# Patient Record
Sex: FEMALE | Race: WHITE | Employment: OTHER | URBAN - METROPOLITAN AREA
[De-identification: names, ages, dates, MRNs, and addresses within clinical notes are randomized per-mention and may not be internally consistent; named-entity substitution may affect disease eponyms.]

---

## 2017-03-16 DIAGNOSIS — F32.3 PSYCHOTIC DEPRESSION (HCC): ICD-10-CM

## 2017-03-16 RX ORDER — QUETIAPINE FUMARATE 25 MG/1
TABLET, FILM COATED ORAL
Qty: 135 TAB | Refills: 0 | Status: SHIPPED | OUTPATIENT
Start: 2017-03-16 | End: 2017-04-24 | Stop reason: SDUPTHER

## 2017-04-24 ENCOUNTER — OFFICE VISIT (OUTPATIENT)
Dept: INTERNAL MEDICINE CLINIC | Age: 82
End: 2017-04-24

## 2017-04-24 VITALS
RESPIRATION RATE: 18 BRPM | TEMPERATURE: 97.9 F | OXYGEN SATURATION: 92 % | HEART RATE: 60 BPM | HEIGHT: 60 IN | WEIGHT: 177.9 LBS | DIASTOLIC BLOOD PRESSURE: 80 MMHG | SYSTOLIC BLOOD PRESSURE: 140 MMHG | BODY MASS INDEX: 34.92 KG/M2

## 2017-04-24 DIAGNOSIS — I10 ESSENTIAL HYPERTENSION: ICD-10-CM

## 2017-04-24 DIAGNOSIS — E11.9 DIABETES MELLITUS WITHOUT COMPLICATION (HCC): Primary | ICD-10-CM

## 2017-04-24 DIAGNOSIS — F32.3 PSYCHOTIC DEPRESSION (HCC): ICD-10-CM

## 2017-04-24 DIAGNOSIS — I51.89 DIASTOLIC DYSFUNCTION: ICD-10-CM

## 2017-04-24 RX ORDER — METOPROLOL SUCCINATE 50 MG/1
TABLET, EXTENDED RELEASE ORAL DAILY
COMMUNITY
End: 2017-07-14

## 2017-04-24 RX ORDER — GUAIFENESIN 100 MG/5ML
81 LIQUID (ML) ORAL DAILY
COMMUNITY
End: 2017-07-14

## 2017-04-24 RX ORDER — QUETIAPINE FUMARATE 25 MG/1
TABLET, FILM COATED ORAL
Qty: 135 TAB | Refills: 1 | Status: SHIPPED | OUTPATIENT
Start: 2017-04-24 | End: 2017-10-23 | Stop reason: SDUPTHER

## 2017-04-24 NOTE — MR AVS SNAPSHOT
Visit Information Date & Time Provider Department Dept. Phone Encounter #  
 4/24/2017 10:30 AM Yuliya Hall MD [facility] [phone] [id number] Follow-up Instructions Return in about 6 months (around 10/24/2017). Follow-up and Disposition History Upcoming Health Maintenance Date Due  
 EYE EXAM RETINAL OR DILATED Q1 7/20/1941 DTaP/Tdap/Td series (1 - Tdap) 7/20/1952 GLAUCOMA SCREENING Q2Y 7/20/1996 OSTEOPOROSIS SCREENING (DEXA) 7/20/1996 MEDICARE YEARLY EXAM 7/20/1996 FOOT EXAM Q1 9/14/2016 LIPID PANEL Q1 3/11/2017 HEMOGLOBIN A1C Q6M 4/27/2017 MICROALBUMIN Q1 10/27/2017 Allergies as of 4/24/2017  Review Complete On: 4/24/2017 By: Corine Altamirano Severity Noted Reaction Type Reactions Meperidine Medium 03/11/2016    Hives Other reaction(s): Hives Ace Inhibitors  05/14/2010    Unknown (comments) Codeine  05/14/2010    Unknown (comments) Miralax [Polyethylene Glycol 3350]  05/14/2010    Nausea and Vomiting Percocet [Oxycodone-acetaminophen]  05/14/2010    Unknown (comments) Statins-hmg-coa Reductase Inhibitors  05/14/2010    Unknown (comments) Sular [Nisoldipine]  05/14/2010    Rash Zetia [Ezetimibe]  05/14/2010    Unknown (comments) Current Immunizations  Reviewed on 10/27/2016 Name Date Influenza High Dose Vaccine PF 10/27/2016 Influenza Vaccine 9/25/2013 Influenza Vaccine Split 9/19/2012, 11/17/2011, 11/10/2010 Pneumococcal Conjugate (PCV-13) 10/27/2016 Pneumococcal Vaccine (Unspecified Type) 5/14/2001 Not reviewed this visit You Were Diagnosed With   
  
 Codes Comments Diabetes mellitus without complication ([facility] [address]    -  Primary ICD-10-CM: E11.9 ICD-9-CM: 250.00 [facility]     ICD-10-CM: F32.3 ICD-9-CM: 296.20 Essential hypertension     ICD-10-CM: I10 
ICD-9-CM: 401.9 Diastolic dysfunction     RPY-69-TF: I51.9 ICD-9-CM: 429.9 Vitals BP Pulse Temp Resp Height(growth percentile) Weight(growth percentile) 140/80 (BP 1 Location: Left arm, BP Patient Position: At rest) 60 97.9 °F (36.6 °C) (Oral) 18 5' (1.524 m) 177 lb 14.4 oz (80.7 kg) SpO2 BMI OB Status Smoking Status 92% 34.74 kg/m2 Hysterectomy Never Smoker Vitals History BMI and BSA Data Body Mass Index Body Surface Area 34.74 kg/m 2 1.85 m 2 Preferred Pharmacy Pharmacy Name Phone [id number] [place] [address] [phone] Your Updated Medication List  
  
   
This list is accurate as of: 4/24/17 10:47 AM.  Always use your most recent med list. ALDACTONE 25 mg tablet Generic drug:  spironolactone Take  by mouth daily. aspirin 81 mg chewable tablet Take 81 mg by mouth daily. bumetanide 1 mg tablet Commonly known as:  Evi Lavender Take 0.5 mg by mouth daily. DULCOLAX (BISACODYL) 5 mg EC tablet Generic drug:  bisacodyl Take 5 mg by mouth daily as needed for Constipation. fluticasone 50 mcg/actuation nasal spray Commonly known as:  Mychal Rodriguez TAKE 1 PUFF BY INHALATION DAILY. QUEtiapine 25 mg tablet Commonly known as:  SEROquel TAKE 1 & 1/2 BY MOUTH NIGHTLY  
  
 TOPROL XL 50 mg XL tablet Generic drug:  metoprolol succinate Take  by mouth daily. XALATAN 0.005 % ophthalmic solution Generic drug:  latanoprost  
Administer 1 Drop to both eyes nightly. Prescriptions Sent to Pharmacy Refills QUEtiapine (SEROQUEL) 25 mg tablet 1 Sig: TAKE 1 & 1/2 BY MOUTH NIGHTLY Class: Normal  
 Pharmacy: [facility] [address] Ph #: [phone] We Performed the Following HEMOGLOBIN A1C WITH EAG [20425 CPT(R)] METABOLIC PANEL, COMPREHENSIVE [84935 CPT(R)] Follow-up Instructions Return in about 6 months (around 10/24/2017). Introducing [facility] & HEALTH SERVICES! Kyleigh Degroot introduces [employer] patient portal. Now you can access parts of your medical record, email your doctor's office, and request medication refills online. 1. In your internet browser, go to [email or web address] 2. Click on the First Time User? Click Here link in the Sign In box. You will see the New Member Sign Up page. 3. Enter your [employer] Access Code exactly as it appears below. You will not need to use this code after youve completed the sign-up process. If you do not sign up before the expiration date, you must request a new code. · [employer] Access Code: [email or web address] Expires: 7/23/2017 10:47 AM 
 
4. Enter the last four digits of your Social Security Number (xxxx) and Date of Birth (mm/dd/yyyy) as indicated and click Submit. You will be taken to the next sign-up page. 5. Create a Second[employer] ID. This will be your Second[employer] login ID and cannot be changed, so think of one that is secure and easy to remember. 6. Create a Second[employer] password. You can change your password at any time. 7. Enter your Password Reset Question and Answer. This can be used at a later time if you forget your password. 8. Enter your e-mail address. You will receive e-mail notification when new information is available in [address] 9. Click Sign Up. You can now view and download portions of your medical record. 10. Click the Download Summary menu link to download a portable copy of your medical information. If you have questions, please visit the Frequently Asked Questions section of the [employer] website. Remember, [employer] is NOT to be used for urgent needs. For medical emergencies, dial 911. Now available from your iPhone and Android! Please provide this summary of care documentation to your next provider. Your primary care clinician is listed as [address] If you have any questions after today's visit, please call [phone].

## 2017-04-24 NOTE — PROGRESS NOTES
HISTORY OF PRESENT ILLNESS  Abhi Cee is a 80 y.o. female. HPI  Here for dm. She is well controlled on diet alone. Her psychotic depression is controlled. She is happy living with her daughter. Her htn and chf are compensated. She sees cardiology. Her pacer was adjusted recently and she has less energy since. Past Medical History:   Diagnosis Date    Anemia     Bradycardia     Depression     Diabetes (HCC)     DJD (degenerative joint disease), lumbar     GERD (gastroesophageal reflux disease)     stricture.  Glucose intolerance     HTN (hypertension)     Keratoconjunctivitis     sicca    Macular degeneration     macular degeneration vs ooptic neuropathy    ABAD (obstructive sleep apnea)     Osteopenia     Otitis media     chronic    Stroke ([facility]     Venous insufficiency 10/10/2011     Current Outpatient Prescriptions   Medication Sig    aspirin 81 mg chewable tablet Take 81 mg by mouth daily.  QUEtiapine (SEROQUEL) 25 mg tablet TAKE 1 & 1/2 BY MOUTH NIGHTLY    fluticasone (FLONASE) 50 mcg/actuation nasal spray TAKE 1 PUFF BY INHALATION DAILY.  bumetanide (BUMEX) 1 mg tablet Take 0.5 mg by mouth daily.  bisacodyl (DULCOLAX, BISACODYL,) 5 mg EC tablet Take 5 mg by mouth daily as needed for Constipation.  latanoprost (XALATAN) 0.005 % ophthalmic solution Administer 1 Drop to both eyes nightly.  metoprolol-XL (TOPROL XL) 100 mg XL tablet Take  by mouth daily.  spironolactone (ALDACTONE) 25 mg tablet Take  by mouth daily. No current facility-administered medications for this visit. Review of Systems   All other systems reviewed and are negative.                                                  Visit Vitals    /80 (BP 1 Location: Left arm, BP Patient Position: At rest)    Pulse 60    Temp 97.9 °F (36.6 °C) (Oral)    Resp 18    Ht 5' (1.524 m)    Wt 177 lb 14.4 oz (80.7 kg)    SpO2 92%    BMI 34.74 kg/m2       Physical Exam   Constitutional: She appears well-developed and well-nourished. Cardiovascular: Normal rate, regular rhythm and normal heart sounds. Pulmonary/Chest: Effort normal and breath sounds normal. No respiratory distress. She has no wheezes. She has no rales. Musculoskeletal: She exhibits no edema. Psychiatric: She has a normal mood and affect. Her behavior is normal. Judgment and thought content normal.   Nursing note and vitals reviewed. Lab Results   Component Value Date/Time    Hemoglobin A1c 5.9 10/27/2016 10:06 AM         ASSESSMENT and Lawerence Single was seen today for hypertension. Diagnoses and all orders for this visit:    Diabetes mellitus without complication ([facility] [address] [facility]  -     HEMOGLOBIN A1C WITH EAG  -     METABOLIC PANEL, COMPREHENSIVE  The current medical regimen is effective;  continue present plan and medications. Psychotic depression (HCC)  -     QUEtiapine (SEROQUEL) 25 mg tablet; TAKE 1 & 1/2 BY MOUTH NIGHTLY  Doing well. Essential hypertension  Reduce toprol to 50 mg .    Diastolic dysfunction  Doing well.     Reviewed plan of care with the patient who has provided input and agrees with the goals

## 2017-04-25 LAB
ALBUMIN SERPL-MCNC: 4.1 G/DL (ref 3.5–4.7)
ALBUMIN/GLOB SERPL: 2.1 {RATIO} (ref 1.2–2.2)
ALP SERPL-CCNC: 82 IU/L (ref 39–117)
ALT SERPL-CCNC: 5 IU/L (ref 0–32)
AST SERPL-CCNC: 19 IU/L (ref 0–40)
BILIRUB SERPL-MCNC: 0.8 MG/DL (ref 0–1.2)
BUN SERPL-MCNC: 16 MG/DL (ref 8–27)
BUN/CREAT SERPL: 17 (ref 12–28)
CALCIUM SERPL-MCNC: 9.3 MG/DL (ref 8.7–10.3)
CHLORIDE SERPL-SCNC: 103 MMOL/L (ref 96–106)
CO2 SERPL-SCNC: 24 MMOL/L (ref 18–29)
CREAT SERPL-MCNC: 0.93 MG/DL (ref 0.57–1)
EST. AVERAGE GLUCOSE BLD GHB EST-MCNC: 126 MG/DL
GLOBULIN SER CALC-MCNC: 2 G/DL (ref 1.5–4.5)
GLUCOSE SERPL-MCNC: 77 MG/DL (ref 65–99)
HBA1C MFR BLD: 6 % (ref 4.8–5.6)
INTERPRETATION: NORMAL
Lab: NORMAL
POTASSIUM SERPL-SCNC: 5.3 MMOL/L (ref 3.5–5.2)
PROT SERPL-MCNC: 6.1 G/DL (ref 6–8.5)
SODIUM SERPL-SCNC: 144 MMOL/L (ref 134–144)

## 2017-06-12 ENCOUNTER — OFFICE VISIT (OUTPATIENT)
Dept: INTERNAL MEDICINE CLINIC | Age: 82
End: 2017-06-12

## 2017-06-12 VITALS
HEART RATE: 55 BPM | WEIGHT: 174.4 LBS | DIASTOLIC BLOOD PRESSURE: 63 MMHG | SYSTOLIC BLOOD PRESSURE: 151 MMHG | TEMPERATURE: 97.9 F | BODY MASS INDEX: 34.24 KG/M2 | OXYGEN SATURATION: 96 % | RESPIRATION RATE: 18 BRPM | HEIGHT: 60 IN

## 2017-06-12 DIAGNOSIS — R10.11 RUQ ABDOMINAL PAIN: Primary | ICD-10-CM

## 2017-06-12 RX ORDER — OMEPRAZOLE 20 MG/1
20 CAPSULE, DELAYED RELEASE ORAL DAILY
Qty: 90 CAP | Refills: 1 | Status: SHIPPED | OUTPATIENT
Start: 2017-06-12 | End: 2017-10-23 | Stop reason: SDUPTHER

## 2017-06-12 NOTE — MR AVS SNAPSHOT
Visit Information Date & Time Provider Department Dept. Phone Encounter #  
 6/12/2017  3:15 PM Gerhardt Duel, MD [address] [facility] [phone] Follow-up Instructions Return if symptoms worsen or fail to improve. Your Appointments 10/25/2017 10:45 AM  
ROUTINE CARE with Gerhardt Duel, MD  
[facility]) Appt Note: 6 MONTH F/U  
 [place] [address] [facility] [address]  
[facility] [address] [facility] [address] [address] Upcoming Health Maintenance Date Due  
 EYE EXAM RETINAL OR DILATED Q1 7/20/1941 DTaP/Tdap/Td series (1 - Tdap) 7/20/1952 GLAUCOMA SCREENING Q2Y 7/20/1996 OSTEOPOROSIS SCREENING (DEXA) 7/20/1996 MEDICARE YEARLY EXAM 7/20/1996 FOOT EXAM Q1 9/14/2016 LIPID PANEL Q1 3/11/2017 INFLUENZA AGE 9 TO ADULT 8/1/2017 HEMOGLOBIN A1C Q6M 10/24/2017 MICROALBUMIN Q1 10/27/2017 Allergies as of 6/12/2017  Review Complete On: 6/12/2017 By: Jovan Apple Severity Noted Reaction Type Reactions Meperidine Medium 03/11/2016    Hives Other reaction(s): Hives Ace Inhibitors  05/14/2010    Unknown (comments) Codeine  05/14/2010    Unknown (comments) Miralax [Polyethylene Glycol 3350]  05/14/2010    Nausea and Vomiting Percocet [Oxycodone-acetaminophen]  05/14/2010    Unknown (comments) Statins-hmg-coa Reductase Inhibitors  05/14/2010    Unknown (comments) Sular [Nisoldipine]  05/14/2010    Rash Zetia [Ezetimibe]  05/14/2010    Unknown (comments) Current Immunizations  Reviewed on 10/27/2016 Name Date Influenza High Dose Vaccine PF 10/27/2016 Influenza Vaccine 9/25/2013 Influenza Vaccine Split 9/19/2012, 11/17/2011, 11/10/2010 Pneumococcal Conjugate (PCV-13) 10/27/2016 Pneumococcal Vaccine (Unspecified Type) 5/14/2001 Not reviewed this visit You Were Diagnosed With   
  
 Codes Comments RUQ abdominal pain    -  Primary ICD-10-CM: R10.11 ICD-9-CM: 789.01 Vitals BP Pulse Temp Resp Height(growth percentile) Weight(growth percentile) 151/63 (BP 1 Location: Left arm, BP Patient Position: At rest) (!) 55 97.9 °F (36.6 °C) (Oral) 18 5' (1.524 m) 174 lb 6.4 oz (79.1 kg) SpO2 BMI OB Status Smoking Status 96% 34.06 kg/m2 Hysterectomy Never Smoker BMI and BSA Data Body Mass Index Body Surface Area 34.06 kg/m 2 1.83 m 2 Preferred Pharmacy Pharmacy Name Phone [id number] Yodit Contreras, [address] [place] [address] [phone] Your Updated Medication List  
  
   
This list is accurate as of: 6/12/17  3:38 PM.  Always use your most recent med list. ALDACTONE 25 mg tablet Generic drug:  spironolactone Take  by mouth daily. aspirin 81 mg chewable tablet Take 81 mg by mouth daily. bumetanide 1 mg tablet Commonly known as:  Shelagh [place] Take 0.5 mg by mouth daily. DULCOLAX (BISACODYL) 5 mg EC tablet Generic drug:  bisacodyl Take 5 mg by mouth daily as needed for Constipation. fluticasone 50 mcg/actuation nasal spray Commonly known as:  Christian Noss TAKE 1 PUFF BY INHALATION DAILY. omeprazole 20 mg capsule Commonly known as:  PRILOSEC Take 1 Cap by mouth daily. QUEtiapine 25 mg tablet Commonly known as:  SEROquel TAKE 1 & 1/2 BY MOUTH NIGHTLY  
  
 TOPROL XL 50 mg XL tablet Generic drug:  metoprolol succinate Take  by mouth daily. XALATAN 0.005 % ophthalmic solution Generic drug:  latanoprost  
Administer 1 Drop to both eyes nightly. Prescriptions Sent to Pharmacy Refills  
 omeprazole (PRILOSEC) 20 mg capsule 1 Sig: Take 1 Cap by mouth daily. Class: Normal  
 Pharmacy: [id number] [facility] [address]  #: [phone] Route: Oral  
  
Follow-up Instructions Return if symptoms worsen or fail to improve. To-Do List   
 06/12/2017 Imaging:  [employer] Introducing [facility] & HEALTH SERVICES! Gabby Galo introduces [employer] patient portal. Now you can access parts of your medical record, email your doctor's office, and request medication refills online. 1. In your internet browser, go to [email or web address] 2. Click on the First Time User? Click Here link in the Sign In box. You will see the New Member Sign Up page. 3. Enter your [employer] Access Code exactly as it appears below. You will not need to use this code after youve completed the sign-up process. If you do not sign up before the expiration date, you must request a new code. · [employer] Access Code: [email or web address] Expires: 7/23/2017 10:47 AM 
 
4. Enter the last four digits of your Social Security Number (xxxx) and Date of Birth (mm/dd/yyyy) as indicated and click Submit. You will be taken to the next sign-up page. 5. Create a [employer] ID. This will be your [employer] login ID and cannot be changed, so think of one that is secure and easy to remember. 6. Create a [employer] password. You can change your password at any time. 7. Enter your Password Reset Question and Answer. This can be used at a later time if you forget your password. 8. Enter your e-mail address. You will receive e-mail notification when new information is available in [address] 9. Click Sign Up. You can now view and download portions of your medical record. 10. Click the Download Summary menu link to download a portable copy of your medical information. If you have questions, please visit the Frequently Asked Questions section of the [employer] website. Remember, [employer] is NOT to be used for urgent needs. For medical emergencies, dial 911. Now available from your iPhone and Android! Please provide this summary of care documentation to your next provider. Your primary care clinician is listed as [id number] [address] [place] [address] [address]. If you have any questions after today's visit, please call [phone].

## 2017-06-12 NOTE — PROGRESS NOTES
1. Have you been to the ER, urgent care clinic since your last visit? Hospitalized since your last visit?no    2. Have you seen or consulted any other health care providers outside of the [address] [place] [address] since your last visit? Include any pap smears or colon screening.  No  Chief Complaint   Patient presents with    Abdominal Pain     Not fasting

## 2017-06-12 NOTE — PROGRESS NOTES
HISTORY OF PRESENT ILLNESS  Emily Dao is a 80 y.o. female. HPI  Here for abdominal pain. This was present yesterday but better today. She had nausea and some reflux. She is moving her bowels. No melena. She is on asa. Past Medical History:   Diagnosis Date    Anemia     Bradycardia     Depression     Diabetes (HCC)     DJD (degenerative joint disease), lumbar     GERD (gastroesophageal reflux disease)     stricture.  Glucose intolerance     HTN (hypertension)     Keratoconjunctivitis     sicca    Macular degeneration     macular degeneration vs ooptic neuropathy    ABAD (obstructive sleep apnea)     Osteopenia     Otitis media     chronic    Stroke ([facility] [address] [facility]     Venous insufficiency 10/10/2011     Current Outpatient Prescriptions   Medication Sig    omeprazole (PRILOSEC) 20 mg capsule Take 1 Cap by mouth daily.  aspirin 81 mg chewable tablet Take 81 mg by mouth daily.  metoprolol succinate (TOPROL XL) 50 mg XL tablet Take  by mouth daily.  QUEtiapine (SEROQUEL) 25 mg tablet TAKE 1 & 1/2 BY MOUTH NIGHTLY    fluticasone (FLONASE) 50 mcg/actuation nasal spray TAKE 1 PUFF BY INHALATION DAILY.  bumetanide (BUMEX) 1 mg tablet Take 0.5 mg by mouth daily.  bisacodyl (DULCOLAX, BISACODYL,) 5 mg EC tablet Take 5 mg by mouth daily as needed for Constipation.  latanoprost (XALATAN) 0.005 % ophthalmic solution Administer 1 Drop to both eyes nightly.  spironolactone (ALDACTONE) 25 mg tablet Take  by mouth daily. No current facility-administered medications for this visit. Review of Systems   All other systems reviewed and are negative. Visit Vitals    /63 (BP 1 Location: Left arm, BP Patient Position: At rest)    Pulse (!) 55    Temp 97.9 °F (36.6 °C) (Oral)    Resp 18    Ht 5' (1.524 m)    Wt 174 lb 6.4 oz (79.1 kg)    SpO2 96%    BMI 34.06 kg/m2       Physical Exam   Constitutional: She appears well-developed and well-nourished.    Abdominal: Bowel sounds are normal. She exhibits no distension and no mass. There is tenderness. There is no rebound and no guarding. Nursing note and vitals reviewed. ASSESSMENT and Pb Alejandro was seen today for abdominal pain. Diagnoses and all orders for this visit:    RUQ abdominal pain  -     US ABD LTD; Future  -     omeprazole (PRILOSEC) 20 mg capsule; Take 1 Cap by mouth daily. Hold ASA. To ER if worse.       Reviewed plan of care with the patient who has provided input and agrees with the goals

## 2017-06-13 ENCOUNTER — TELEPHONE (OUTPATIENT)
Dept: INTERNAL MEDICINE CLINIC | Age: 82
End: 2017-06-13

## 2017-06-13 ENCOUNTER — HOSPITAL ENCOUNTER (OUTPATIENT)
Dept: ULTRASOUND IMAGING | Age: 82
Discharge: HOME | End: 2017-06-13
Payer: MEDICARE

## 2017-06-13 DIAGNOSIS — R10.11 RUQ ABDOMINAL PAIN: ICD-10-CM

## 2017-06-13 DIAGNOSIS — R10.11 RUQ ABDOMINAL PAIN: Primary | ICD-10-CM

## 2017-06-13 PROCEDURE — 76705 ECHO EXAM OF ABDOMEN: CPT

## 2017-06-13 NOTE — TELEPHONE ENCOUNTER
Spoke with patient. Advised patient per Dr. Erica Sullivan that ultrasound is normal. Need HIDA scan to make samina gallbladder empties well.  Patient verbalized understanding

## 2017-07-05 ENCOUNTER — HOSPITAL ENCOUNTER (OUTPATIENT)
Dept: NUCLEAR MEDICINE | Age: 82
Discharge: HOME | End: 2017-07-05
Payer: MEDICARE

## 2017-07-05 DIAGNOSIS — R10.11 RUQ ABDOMINAL PAIN: ICD-10-CM

## 2017-07-05 PROCEDURE — 78226 HEPATOBILIARY SYSTEM IMAGING: CPT

## 2017-07-06 ENCOUNTER — TELEPHONE (OUTPATIENT)
Dept: INTERNAL MEDICINE CLINIC | Age: 82
End: 2017-07-06

## 2017-07-06 DIAGNOSIS — K82.9 GALLBLADDER ATTACK: Primary | ICD-10-CM

## 2017-07-06 NOTE — TELEPHONE ENCOUNTER
Spoke with patient. Advised per Dr Bettye Davidson that advise per Dr Dee Hedrick that gallbladder appears to empty slowly. See general surgery Dr Don Hubbard phone number [phone] if still having pain.  Patient verbalized understanding

## 2017-07-06 NOTE — TELEPHONE ENCOUNTER
Left message for patient to call back to advise per Dr Manzo Mt that gallbladder appears to empty slowly.  See general surgery Dr Maru Danielson phone number [phone] if still having pain

## 2017-07-11 ENCOUNTER — OFFICE VISIT (OUTPATIENT)
Dept: SURGERY | Age: 82
End: 2017-07-11

## 2017-07-11 VITALS
SYSTOLIC BLOOD PRESSURE: 119 MMHG | DIASTOLIC BLOOD PRESSURE: 54 MMHG | OXYGEN SATURATION: 96 % | HEART RATE: 53 BPM | HEIGHT: 60 IN | BODY MASS INDEX: 34.16 KG/M2 | TEMPERATURE: 98.7 F | WEIGHT: 174 LBS | RESPIRATION RATE: 14 BRPM

## 2017-07-11 DIAGNOSIS — K81.1 CHRONIC CHOLECYSTITIS: Primary | ICD-10-CM

## 2017-07-11 NOTE — PROGRESS NOTES
Cholelithasis Consultation      Subjective:      Diya Davis is an 80 y.o.  female who was referred by:  Moreno Salazar MD for evaluation of chronic cholecystitis. Patient complains of epigastric pain. It is described as sharp and rated as moderate. It has been going on for about 6 weeks. Eating makes the pain worse. Nothing makes it better. It is associated with nausea and reflux. Patient denies fevers, chills, malaise and change in bowel habits. Past Medical History:   Diagnosis Date    Anemia     Bradycardia     Depression     \"better since moving into my daughters mother-in-law suite\"    Diabetes ([facility]     \"border\" per patient report    DJD (degenerative joint disease), lumbar     GERD (gastroesophageal reflux disease)     stricture.  Glaucoma     Glucose intolerance     [place] (hard of hearing)     HTN (hypertension)     Keratoconjunctivitis     sicca    Macular degeneration     macular degeneration vs ooptic neuropathy    ABAD (obstructive sleep apnea)     decided not to use d/t frequent bathroom trips at night. didn't help    Osteopenia     Otitis media     chronic    Stroke [facility] 2011    Mini stroke \"in the past, affected my eyes\" Need glasses for reading    Venous insufficiency 10/10/2011     Family History   Problem Relation Age of Onset    Heart Failure Mother     Heart Disease Father     Stroke Brother      Cannot display prior to admission medications because the patient has not been admitted in this contact.      Allergies   Allergen Reactions    Meperidine Hives     Other reaction(s): Hives    Ace Inhibitors Unknown (comments)    Codeine Unknown (comments)    Miralax [Polyethylene Glycol 3350] Nausea and Vomiting    Other Plant, Animal, Environmental Rash     Bilateral Hearing Aids cause severe reaction per pt report    Percocet [Oxycodone-Acetaminophen] Unknown (comments)    Statins-Hmg-Coa Reductase Inhibitors Unknown (comments)    Sular [Nisoldipine] Rash    Zetia [Ezetimibe] Unknown (comments)     Social History     Social History    Marital status:      Spouse name: N/A    Number of children: N/A    Years of education: N/A     Occupational History    Not on file. Social History Main Topics    Smoking status: Never Smoker    Smokeless tobacco: Never Used    Alcohol use No    Drug use: No    Sexual activity: Not Currently     Partners: Male     Other Topics Concern    Not on file     Social History Narrative       Review of Systems: A comprehensive review of systems was negative except for that written in the HPI. Objective:        Visit Vitals    /54 (BP 1 Location: Left arm, BP Patient Position: Sitting)    Pulse (!) 53    Temp 98.7 °F (37.1 °C) (Oral)    Resp 14    Ht 5' (1.524 m)    Wt 174 lb (78.9 kg)    SpO2 96%    BMI 33.98 kg/m2        Visit Vitals    /54 (BP 1 Location: Left arm, BP Patient Position: Sitting)    Pulse (!) 53    Temp 98.7 °F (37.1 °C) (Oral)    Resp 14    Ht 5' (1.524 m)    Wt 174 lb (78.9 kg)    SpO2 96%    BMI 33.98 kg/m2     General appearance: alert, cooperative, no distress, appears stated age, mildly obese  Head: Normocephalic, without obvious abnormality, atraumatic  Eyes: conjunctivae/corneas clear., EOM's intact. Lungs: clear to auscultation bilaterally  Heart: regular rate and rhythm, S1, S2 normal, no murmur, click, rub or gallop  Abdomen: soft, non-tender. Bowel sounds normal. No masses,  no organomegaly  Neurologic: Alert and oriented X 3, . Normal symmetric reflexes. Normal coordination and gait    Imaging:  images and reports reviewed    Lab Review:      No results found for this or any previous visit (from the past 24 hour(s)). Labs and radiology images and reports reviewed      Assessment:     Likely chronic Cholecystitis    Plan/Recommendations/Medical Decision Makin.  I recommend laparoscopic cholecystectomy with possible cholangiogram Treatment alternatives were discussed. 2. Discussed aspects of surgical intervention, methods, risks (including by not limited to infection, bleeding, retained gallstones in the abdominal cavity and/or the main bile ducts, and injury to adjacent structures including the biliary system, common bile duct, and intestines.)  The patient understands the risks, any and all questions were answered to the patient's satisfaction. 3. Patient does wish to proceed with surgery. Surgery : laparoscopic cholecystectomy, cholangiogram, possible open     Length:  1 hours    Diagnosis :     ICD-10-CM ICD-9-CM   1.  Chronic cholecystitis K81.1 575.11       Anesthesia : general anesthesia    Surgery Type: observation    Position:  supine    Hospital : [facility]    Blood thinner NO

## 2017-07-11 NOTE — PROGRESS NOTES
1. Have you been to the ER, urgent care clinic since your last visit? Hospitalized since your last visit?no    2. Have you seen or consulted any other health care providers outside of the [address] [place] [address] since your last visit? Include any pap smears or colon screening.  no

## 2017-07-14 ENCOUNTER — HOSPITAL ENCOUNTER (OUTPATIENT)
Dept: PREADMISSION TESTING | Age: 82
Discharge: HOME | End: 2017-07-14
Payer: MEDICARE

## 2017-07-14 ENCOUNTER — HOSPITAL ENCOUNTER (OUTPATIENT)
Dept: GENERAL RADIOLOGY | Age: 82
Discharge: HOME | End: 2017-07-14
Attending: SURGERY
Payer: MEDICARE

## 2017-07-14 VITALS
RESPIRATION RATE: 16 BRPM | SYSTOLIC BLOOD PRESSURE: 188 MMHG | TEMPERATURE: 98.1 F | DIASTOLIC BLOOD PRESSURE: 79 MMHG | HEIGHT: 61 IN | BODY MASS INDEX: 32.72 KG/M2 | WEIGHT: 173.28 LBS | OXYGEN SATURATION: 95 % | HEART RATE: 56 BPM

## 2017-07-14 LAB
ALBUMIN SERPL BCP-MCNC: 3.8 G/DL (ref 3.5–5)
ALBUMIN/GLOB SERPL: 1.3 {RATIO} (ref 1.1–2.2)
ALP SERPL-CCNC: 80 U/L (ref 45–117)
ALT SERPL-CCNC: 12 U/L (ref 12–78)
ANION GAP BLD CALC-SCNC: 8 MMOL/L (ref 5–15)
AST SERPL W P-5'-P-CCNC: 11 U/L (ref 15–37)
ATRIAL RATE: 72 BPM
BASOPHILS # BLD AUTO: 0 K/UL (ref 0–0.1)
BASOPHILS # BLD: 0 % (ref 0–1)
BILIRUB SERPL-MCNC: 0.6 MG/DL (ref 0.2–1)
BUN SERPL-MCNC: 19 MG/DL (ref 6–20)
BUN/CREAT SERPL: 19 (ref 12–20)
CALCIUM SERPL-MCNC: 9.1 MG/DL (ref 8.5–10.1)
CALCULATED P AXIS, ECG09: -27 DEGREES
CALCULATED R AXIS, ECG10: -47 DEGREES
CALCULATED T AXIS, ECG11: 24 DEGREES
CHLORIDE SERPL-SCNC: 106 MMOL/L (ref 97–108)
CO2 SERPL-SCNC: 29 MMOL/L (ref 21–32)
CREAT SERPL-MCNC: 0.98 MG/DL (ref 0.55–1.02)
DIAGNOSIS, 93000: NORMAL
EOSINOPHIL # BLD: 0.2 K/UL (ref 0–0.4)
EOSINOPHIL NFR BLD: 3 % (ref 0–7)
ERYTHROCYTE [DISTWIDTH] IN BLOOD BY AUTOMATED COUNT: 13 % (ref 11.5–14.5)
GLOBULIN SER CALC-MCNC: 2.9 G/DL (ref 2–4)
GLUCOSE SERPL-MCNC: 83 MG/DL (ref 65–100)
HCT VFR BLD AUTO: 46.5 % (ref 35–47)
HGB BLD-MCNC: 15.9 G/DL (ref 11.5–16)
LYMPHOCYTES # BLD AUTO: 20 % (ref 12–49)
LYMPHOCYTES # BLD: 1.6 K/UL (ref 0.8–3.5)
MCH RBC QN AUTO: 30.6 PG (ref 26–34)
MCHC RBC AUTO-ENTMCNC: 34.2 G/DL (ref 30–36.5)
MCV RBC AUTO: 89.4 FL (ref 80–99)
MONOCYTES # BLD: 0.9 K/UL (ref 0–1)
MONOCYTES NFR BLD AUTO: 12 % (ref 5–13)
NEUTS SEG # BLD: 5.2 K/UL (ref 1.8–8)
NEUTS SEG NFR BLD AUTO: 65 % (ref 32–75)
P-R INTERVAL, ECG05: 278 MS
PLATELET # BLD AUTO: 165 K/UL (ref 150–400)
POTASSIUM SERPL-SCNC: 4.7 MMOL/L (ref 3.5–5.1)
PROT SERPL-MCNC: 6.7 G/DL (ref 6.4–8.2)
Q-T INTERVAL, ECG07: 408 MS
QRS DURATION, ECG06: 78 MS
QTC CALCULATION (BEZET), ECG08: 446 MS
RBC # BLD AUTO: 5.2 M/UL (ref 3.8–5.2)
SODIUM SERPL-SCNC: 143 MMOL/L (ref 136–145)
VENTRICULAR RATE, ECG03: 72 BPM
WBC # BLD AUTO: 8 K/UL (ref 3.6–11)

## 2017-07-14 PROCEDURE — 85025 COMPLETE CBC W/AUTO DIFF WBC: CPT | Performed by: SURGERY

## 2017-07-14 PROCEDURE — 71020 XR CHEST PA LAT: CPT

## 2017-07-14 PROCEDURE — 36415 COLL VENOUS BLD VENIPUNCTURE: CPT | Performed by: SURGERY

## 2017-07-14 PROCEDURE — 93005 ELECTROCARDIOGRAM TRACING: CPT

## 2017-07-14 PROCEDURE — 80053 COMPREHEN METABOLIC PANEL: CPT | Performed by: SURGERY

## 2017-07-14 RX ORDER — METOPROLOL SUCCINATE 100 MG/1
100 TABLET, EXTENDED RELEASE ORAL DAILY
COMMUNITY
End: 2018-04-11 | Stop reason: ALTCHOICE

## 2017-07-14 NOTE — PERIOP NOTES
[facility]  PREOPERATIVE INSTRUCTIONS    Surgery Date:   7/24/17    Surgery arrival time given by surgeon: NO  (If [facility] staff will call you between 4pm - 8pm the day before surgery with your arrival time. If your surgery is on a Monday, we will call you the preceding Friday. Please call [phone] after 7pm if you did not receive your arrival time.)    1. Report  to the 2nd Floor Admitting Desk at the time you were told the night before surgery. Bring your insurance card, photo identification, and any copayment (if applicable). 2. You must have a responsible adult to drive you home and stay with you the first 24 hours after surgery if you are going home the same day of your surgery. 3. Nothing to eat or drink after midnight the night before surgery. This means NO water, gum, mints, coffee, juice, etc.    4. No alcoholic beverages 24 hours before and after your surgery. 5. If you are being admitted to the hospital,please leave personal belongings/luggage in your car until you have an assigned hospital room number. 6. The hospital discharge time is 12pm NOON. Your adult  should be here prior to the 12pm NOON discharge time. 7. NO Aspirin and/or any non-steroidal anti-inflammatory drugs (i.e. Ibuprofen, Naproxen, Advil, Aleve) as directed by your surgeon. You may take Tylenol. Stop herbal supplements 1 week prior to  surgery. 8. If you are currently taking Plavix, Coumadin,or any other blood-thinning/ anticoagulant medication contact your surgeon for instructions. 9. Wear comfortable clothes. Wear your glasses instead of contacts. Please leave all money, jewelry and valuables at home. No make up, particularly mascara or finger nail polish, the day of surgery. 10.  REMOVE ALL body piercings, rings,and jewelry and leave at home. Wear your hair loose or down.; no pony-tails, buns, or any metal hair clips.    11. If you shower the morning of surgery, please do not apply any lotions, powders, or deodorants afterwards. Do not shave any body area within 24 hours of your surgery. 12. Please follow all instructions to avoid any potential surgical cancellation. 13. Should your physical condition change, (i.e. fever, cold, flu, etc.) please notify your surgeon as soon as possible. 14. It is important to be on time. If a situation occurs where you may be delayed, please call:  [phone] / [phone] on the day of surgery. 15. The Preadmission Testing staff can be reached at [phone]. 16. MEDICATIONS TO TAKE THE MORNING OF SURGERY WITH A SIP OF WATER: METOPROLOL, OMEPRAZOLE  17. Special instructions: DO NOT TAKE YOUR WATER PILLS THE MORNING OF SURGERY    The patient was contacted  in person. She  verbalize  understanding of all instructions does not  need reinforcement.

## 2017-07-17 ENCOUNTER — TELEPHONE (OUTPATIENT)
Dept: SURGERY | Age: 82
End: 2017-07-17

## 2017-07-17 NOTE — PERIOP NOTES
Called the office of Dr. Drea Waters and spoke with Giovanna Mitchell to let them know the patient will need a cardiac clearance per Dr. Shagufta Nash. 7/17/2017.  DOS: 7/24/2017

## 2017-07-17 NOTE — TELEPHONE ENCOUNTER
Patient has appointment set for 7/24/17 @ 3:15 with Dr Neptali Greene for cardiac clearance. Patient understands surgery is cancelled until she is clear by Dr Neptali Greene.

## 2017-07-28 ENCOUNTER — ANESTHESIA EVENT (OUTPATIENT)
Dept: SURGERY | Age: 82
End: 2017-07-28
Payer: MEDICARE

## 2017-07-31 ENCOUNTER — HOSPITAL ENCOUNTER (OUTPATIENT)
Age: 82
Setting detail: OBSERVATION
Discharge: HOME | End: 2017-08-01
Attending: SURGERY | Admitting: SURGERY
Payer: MEDICARE

## 2017-07-31 ENCOUNTER — ANESTHESIA (OUTPATIENT)
Dept: SURGERY | Age: 82
End: 2017-07-31
Payer: MEDICARE

## 2017-07-31 DIAGNOSIS — Z90.49 S/P LAPAROSCOPIC CHOLECYSTECTOMY: ICD-10-CM

## 2017-07-31 PROBLEM — K80.20 GALLSTONES: Status: ACTIVE | Noted: 2017-07-31

## 2017-07-31 PROCEDURE — 76060000033 HC ANESTHESIA 1 TO 1.5 HR: Performed by: SURGERY

## 2017-07-31 PROCEDURE — 77030008771 HC TU NG SALEM SUMP -A: Performed by: NURSE ANESTHETIST, CERTIFIED REGISTERED

## 2017-07-31 PROCEDURE — 77030035051: Performed by: SURGERY

## 2017-07-31 PROCEDURE — 77010033678 HC OXYGEN DAILY

## 2017-07-31 PROCEDURE — 77030035045 HC TRCR ENDOSC VRSPRT BLDLSS COVD -B: Performed by: SURGERY

## 2017-07-31 PROCEDURE — 77030008684 HC TU ET CUF COVD -B: Performed by: NURSE ANESTHETIST, CERTIFIED REGISTERED

## 2017-07-31 PROCEDURE — 74011250637 HC RX REV CODE- 250/637: Performed by: SURGERY

## 2017-07-31 PROCEDURE — 74011250636 HC RX REV CODE- 250/636: Performed by: SURGERY

## 2017-07-31 PROCEDURE — 77030019908 HC STETH ESOPH SIMS -A: Performed by: NURSE ANESTHETIST, CERTIFIED REGISTERED

## 2017-07-31 PROCEDURE — 77030018836 HC SOL IRR NACL ICUM -A: Performed by: SURGERY

## 2017-07-31 PROCEDURE — 77030020263 HC SOL INJ SOD CL0.9% LFCR 1000ML: Performed by: SURGERY

## 2017-07-31 PROCEDURE — 77030020782 HC GWN BAIR PAWS FLX 3M -B

## 2017-07-31 PROCEDURE — 77030010507 HC ADH SKN DERMBND J&J -B: Performed by: SURGERY

## 2017-07-31 PROCEDURE — 74011000250 HC RX REV CODE- 250: Performed by: SURGERY

## 2017-07-31 PROCEDURE — 77030037032 HC INSRT SCIS CLICKLLINE DISP STOR -B: Performed by: SURGERY

## 2017-07-31 PROCEDURE — 77030009852 HC PCH RTVR ENDOSC COVD -B: Performed by: SURGERY

## 2017-07-31 PROCEDURE — 74011250636 HC RX REV CODE- 250/636

## 2017-07-31 PROCEDURE — 77030008756 HC TU IRR SUC STRY -B: Performed by: SURGERY

## 2017-07-31 PROCEDURE — 74011000250 HC RX REV CODE- 250

## 2017-07-31 PROCEDURE — 77030035048 HC TRCR ENDOSC OPTCL COVD -B: Performed by: SURGERY

## 2017-07-31 PROCEDURE — 77030018390 HC SPNG HEMSTAT2 J&J -B: Performed by: SURGERY

## 2017-07-31 PROCEDURE — 74011250636 HC RX REV CODE- 250/636: Performed by: ANESTHESIOLOGY

## 2017-07-31 PROCEDURE — 76210000016 HC OR PH I REC 1 TO 1.5 HR: Performed by: SURGERY

## 2017-07-31 PROCEDURE — 77030026438 HC STYL ET INTUB CARD -A: Performed by: NURSE ANESTHETIST, CERTIFIED REGISTERED

## 2017-07-31 PROCEDURE — 77030002895 HC DEV VASC CLOSR COVD -B: Performed by: SURGERY

## 2017-07-31 PROCEDURE — 77030009403 HC ELECTRD ENDO MEGA -B: Performed by: SURGERY

## 2017-07-31 PROCEDURE — 77030032490 HC SLV COMPR SCD KNE COVD -B: Performed by: SURGERY

## 2017-07-31 PROCEDURE — 77030031139 HC SUT VCRL2 J&J -A: Performed by: SURGERY

## 2017-07-31 PROCEDURE — 77030004813 HC CATH CHOLGM TELE -B: Performed by: SURGERY

## 2017-07-31 PROCEDURE — 77030020747 HC TU INSUF ENDOSC TELE -A: Performed by: SURGERY

## 2017-07-31 PROCEDURE — 77030012029 HC APPL CLP LIG COVD -C: Performed by: SURGERY

## 2017-07-31 PROCEDURE — 76010000149 HC OR TIME 1 TO 1.5 HR: Performed by: SURGERY

## 2017-07-31 PROCEDURE — 77030002933 HC SUT MCRYL J&J -A: Performed by: SURGERY

## 2017-07-31 PROCEDURE — 88304 TISSUE EXAM BY PATHOLOGIST: CPT | Performed by: SURGERY

## 2017-07-31 PROCEDURE — 99218 HC RM OBSERVATION: CPT

## 2017-07-31 PROCEDURE — 77030011640 HC PAD GRND REM COVD -A: Performed by: SURGERY

## 2017-07-31 RX ORDER — FENTANYL CITRATE 50 UG/ML
INJECTION, SOLUTION INTRAMUSCULAR; INTRAVENOUS AS NEEDED
Status: DISCONTINUED | OUTPATIENT
Start: 2017-07-31 | End: 2017-07-31 | Stop reason: HOSPADM

## 2017-07-31 RX ORDER — SODIUM CHLORIDE, SODIUM LACTATE, POTASSIUM CHLORIDE, CALCIUM CHLORIDE 600; 310; 30; 20 MG/100ML; MG/100ML; MG/100ML; MG/100ML
150 INJECTION, SOLUTION INTRAVENOUS CONTINUOUS
Status: DISCONTINUED | OUTPATIENT
Start: 2017-07-31 | End: 2017-07-31 | Stop reason: HOSPADM

## 2017-07-31 RX ORDER — DIPHENHYDRAMINE HYDROCHLORIDE 50 MG/ML
12.5 INJECTION, SOLUTION INTRAMUSCULAR; INTRAVENOUS AS NEEDED
Status: DISCONTINUED | OUTPATIENT
Start: 2017-07-31 | End: 2017-07-31 | Stop reason: HOSPADM

## 2017-07-31 RX ORDER — CEFAZOLIN SODIUM IN 0.9 % NACL 2 G/50 ML
2 INTRAVENOUS SOLUTION, PIGGYBACK (ML) INTRAVENOUS EVERY 8 HOURS
Status: COMPLETED | OUTPATIENT
Start: 2017-07-31 | End: 2017-08-01

## 2017-07-31 RX ORDER — NEOSTIGMINE METHYLSULFATE 1 MG/ML
INJECTION INTRAVENOUS AS NEEDED
Status: DISCONTINUED | OUTPATIENT
Start: 2017-07-31 | End: 2017-07-31 | Stop reason: HOSPADM

## 2017-07-31 RX ORDER — SODIUM CHLORIDE, SODIUM LACTATE, POTASSIUM CHLORIDE, CALCIUM CHLORIDE 600; 310; 30; 20 MG/100ML; MG/100ML; MG/100ML; MG/100ML
125 INJECTION, SOLUTION INTRAVENOUS CONTINUOUS
Status: DISCONTINUED | OUTPATIENT
Start: 2017-07-31 | End: 2017-07-31 | Stop reason: HOSPADM

## 2017-07-31 RX ORDER — ROCURONIUM BROMIDE 10 MG/ML
INJECTION, SOLUTION INTRAVENOUS AS NEEDED
Status: DISCONTINUED | OUTPATIENT
Start: 2017-07-31 | End: 2017-07-31 | Stop reason: HOSPADM

## 2017-07-31 RX ORDER — GLYCOPYRROLATE 0.2 MG/ML
INJECTION INTRAMUSCULAR; INTRAVENOUS AS NEEDED
Status: DISCONTINUED | OUTPATIENT
Start: 2017-07-31 | End: 2017-07-31 | Stop reason: HOSPADM

## 2017-07-31 RX ORDER — ALBUTEROL SULFATE 0.83 MG/ML
2.5 SOLUTION RESPIRATORY (INHALATION) AS NEEDED
Status: DISCONTINUED | OUTPATIENT
Start: 2017-07-31 | End: 2017-07-31 | Stop reason: HOSPADM

## 2017-07-31 RX ORDER — METOPROLOL SUCCINATE 50 MG/1
100 TABLET, EXTENDED RELEASE ORAL DAILY
Status: DISCONTINUED | OUTPATIENT
Start: 2017-08-01 | End: 2017-08-01 | Stop reason: HOSPADM

## 2017-07-31 RX ORDER — PANTOPRAZOLE SODIUM 40 MG/1
40 TABLET, DELAYED RELEASE ORAL DAILY
Status: DISCONTINUED | OUTPATIENT
Start: 2017-07-31 | End: 2017-08-01 | Stop reason: HOSPADM

## 2017-07-31 RX ORDER — SODIUM CHLORIDE 0.9 % (FLUSH) 0.9 %
5-10 SYRINGE (ML) INJECTION AS NEEDED
Status: DISCONTINUED | OUTPATIENT
Start: 2017-07-31 | End: 2017-08-01 | Stop reason: HOSPADM

## 2017-07-31 RX ORDER — QUETIAPINE FUMARATE 25 MG/1
37.5 TABLET, FILM COATED ORAL
Status: DISCONTINUED | OUTPATIENT
Start: 2017-07-31 | End: 2017-08-01 | Stop reason: HOSPADM

## 2017-07-31 RX ORDER — ONDANSETRON 2 MG/ML
4 INJECTION INTRAMUSCULAR; INTRAVENOUS
Status: DISCONTINUED | OUTPATIENT
Start: 2017-07-31 | End: 2017-08-01 | Stop reason: HOSPADM

## 2017-07-31 RX ORDER — LIDOCAINE HYDROCHLORIDE 10 MG/ML
0.1 INJECTION, SOLUTION EPIDURAL; INFILTRATION; INTRACAUDAL; PERINEURAL AS NEEDED
Status: DISCONTINUED | OUTPATIENT
Start: 2017-07-31 | End: 2017-07-31 | Stop reason: HOSPADM

## 2017-07-31 RX ORDER — HYDROCODONE BITARTRATE AND ACETAMINOPHEN 5; 325 MG/1; MG/1
1 TABLET ORAL
Status: DISCONTINUED | OUTPATIENT
Start: 2017-07-31 | End: 2017-08-01 | Stop reason: HOSPADM

## 2017-07-31 RX ORDER — MIDAZOLAM HYDROCHLORIDE 1 MG/ML
INJECTION, SOLUTION INTRAMUSCULAR; INTRAVENOUS AS NEEDED
Status: DISCONTINUED | OUTPATIENT
Start: 2017-07-31 | End: 2017-07-31 | Stop reason: HOSPADM

## 2017-07-31 RX ORDER — SUCCINYLCHOLINE CHLORIDE 20 MG/ML
INJECTION INTRAMUSCULAR; INTRAVENOUS AS NEEDED
Status: DISCONTINUED | OUTPATIENT
Start: 2017-07-31 | End: 2017-07-31 | Stop reason: HOSPADM

## 2017-07-31 RX ORDER — CEFAZOLIN SODIUM IN 0.9 % NACL 2 G/50 ML
2 INTRAVENOUS SOLUTION, PIGGYBACK (ML) INTRAVENOUS
Status: COMPLETED | OUTPATIENT
Start: 2017-07-31 | End: 2017-07-31

## 2017-07-31 RX ORDER — MORPHINE SULFATE 2 MG/ML
2 INJECTION, SOLUTION INTRAMUSCULAR; INTRAVENOUS
Status: DISCONTINUED | OUTPATIENT
Start: 2017-07-31 | End: 2017-08-01 | Stop reason: HOSPADM

## 2017-07-31 RX ORDER — LIDOCAINE HYDROCHLORIDE 20 MG/ML
INJECTION, SOLUTION EPIDURAL; INFILTRATION; INTRACAUDAL; PERINEURAL AS NEEDED
Status: DISCONTINUED | OUTPATIENT
Start: 2017-07-31 | End: 2017-07-31 | Stop reason: HOSPADM

## 2017-07-31 RX ORDER — LATANOPROST 50 UG/ML
1 SOLUTION/ DROPS OPHTHALMIC
Status: DISCONTINUED | OUTPATIENT
Start: 2017-07-31 | End: 2017-08-01 | Stop reason: HOSPADM

## 2017-07-31 RX ORDER — DIPHENHYDRAMINE HYDROCHLORIDE 50 MG/ML
25 INJECTION, SOLUTION INTRAMUSCULAR; INTRAVENOUS
Status: DISCONTINUED | OUTPATIENT
Start: 2017-07-31 | End: 2017-08-01 | Stop reason: HOSPADM

## 2017-07-31 RX ORDER — NALOXONE HYDROCHLORIDE 0.4 MG/ML
0.4 INJECTION, SOLUTION INTRAMUSCULAR; INTRAVENOUS; SUBCUTANEOUS AS NEEDED
Status: DISCONTINUED | OUTPATIENT
Start: 2017-07-31 | End: 2017-08-01 | Stop reason: HOSPADM

## 2017-07-31 RX ORDER — LORAZEPAM 2 MG/ML
1 INJECTION INTRAMUSCULAR
Status: DISCONTINUED | OUTPATIENT
Start: 2017-07-31 | End: 2017-08-01 | Stop reason: HOSPADM

## 2017-07-31 RX ORDER — DEXAMETHASONE SODIUM PHOSPHATE 4 MG/ML
INJECTION, SOLUTION INTRA-ARTICULAR; INTRALESIONAL; INTRAMUSCULAR; INTRAVENOUS; SOFT TISSUE AS NEEDED
Status: DISCONTINUED | OUTPATIENT
Start: 2017-07-31 | End: 2017-07-31 | Stop reason: HOSPADM

## 2017-07-31 RX ORDER — ONDANSETRON 2 MG/ML
INJECTION INTRAMUSCULAR; INTRAVENOUS AS NEEDED
Status: DISCONTINUED | OUTPATIENT
Start: 2017-07-31 | End: 2017-07-31 | Stop reason: HOSPADM

## 2017-07-31 RX ORDER — BUMETANIDE 1 MG/1
0.5 TABLET ORAL DAILY
Status: DISCONTINUED | OUTPATIENT
Start: 2017-07-31 | End: 2017-08-01 | Stop reason: HOSPADM

## 2017-07-31 RX ORDER — SPIRONOLACTONE 25 MG/1
25 TABLET ORAL DAILY
Status: DISCONTINUED | OUTPATIENT
Start: 2017-07-31 | End: 2017-08-01 | Stop reason: HOSPADM

## 2017-07-31 RX ORDER — SODIUM CHLORIDE 0.9 % (FLUSH) 0.9 %
5-10 SYRINGE (ML) INJECTION EVERY 8 HOURS
Status: DISCONTINUED | OUTPATIENT
Start: 2017-07-31 | End: 2017-08-01 | Stop reason: HOSPADM

## 2017-07-31 RX ORDER — PROPOFOL 10 MG/ML
INJECTION, EMULSION INTRAVENOUS AS NEEDED
Status: DISCONTINUED | OUTPATIENT
Start: 2017-07-31 | End: 2017-07-31 | Stop reason: HOSPADM

## 2017-07-31 RX ORDER — HYDROMORPHONE HYDROCHLORIDE 1 MG/ML
0.5 INJECTION, SOLUTION INTRAMUSCULAR; INTRAVENOUS; SUBCUTANEOUS
Status: DISCONTINUED | OUTPATIENT
Start: 2017-07-31 | End: 2017-07-31 | Stop reason: HOSPADM

## 2017-07-31 RX ORDER — ONDANSETRON 2 MG/ML
4 INJECTION INTRAMUSCULAR; INTRAVENOUS AS NEEDED
Status: DISCONTINUED | OUTPATIENT
Start: 2017-07-31 | End: 2017-07-31 | Stop reason: HOSPADM

## 2017-07-31 RX ORDER — BUPIVACAINE HYDROCHLORIDE AND EPINEPHRINE 5; 5 MG/ML; UG/ML
INJECTION, SOLUTION EPIDURAL; INTRACAUDAL; PERINEURAL AS NEEDED
Status: DISCONTINUED | OUTPATIENT
Start: 2017-07-31 | End: 2017-07-31 | Stop reason: HOSPADM

## 2017-07-31 RX ORDER — HYDROCODONE BITARTRATE AND ACETAMINOPHEN 5; 325 MG/1; MG/1
1 TABLET ORAL
Qty: 40 TAB | Refills: 0 | Status: SHIPPED | OUTPATIENT
Start: 2017-07-31 | End: 2018-01-30

## 2017-07-31 RX ORDER — SODIUM CHLORIDE 9 MG/ML
125 INJECTION, SOLUTION INTRAVENOUS CONTINUOUS
Status: DISCONTINUED | OUTPATIENT
Start: 2017-07-31 | End: 2017-07-31 | Stop reason: HOSPADM

## 2017-07-31 RX ORDER — IBUPROFEN 400 MG/1
400 TABLET ORAL
Status: DISCONTINUED | OUTPATIENT
Start: 2017-07-31 | End: 2017-08-01 | Stop reason: HOSPADM

## 2017-07-31 RX ADMIN — IBUPROFEN 400 MG: 400 TABLET ORAL at 16:51

## 2017-07-31 RX ADMIN — QUETIAPINE FUMARATE 37.5 MG: 25 TABLET ORAL at 22:15

## 2017-07-31 RX ADMIN — FENTANYL CITRATE 50 MCG: 50 INJECTION, SOLUTION INTRAMUSCULAR; INTRAVENOUS at 07:43

## 2017-07-31 RX ADMIN — BUMETANIDE 0.5 MG: 1 TABLET ORAL at 11:50

## 2017-07-31 RX ADMIN — FENTANYL CITRATE 50 MCG: 50 INJECTION, SOLUTION INTRAMUSCULAR; INTRAVENOUS at 08:30

## 2017-07-31 RX ADMIN — MIDAZOLAM HYDROCHLORIDE 1 MG: 1 INJECTION, SOLUTION INTRAMUSCULAR; INTRAVENOUS at 07:29

## 2017-07-31 RX ADMIN — HYDROMORPHONE HYDROCHLORIDE 0.25 MG: 1 INJECTION, SOLUTION INTRAMUSCULAR; INTRAVENOUS; SUBCUTANEOUS at 09:25

## 2017-07-31 RX ADMIN — LIDOCAINE HYDROCHLORIDE 40 MG: 20 INJECTION, SOLUTION EPIDURAL; INFILTRATION; INTRACAUDAL; PERINEURAL at 07:43

## 2017-07-31 RX ADMIN — DEXAMETHASONE SODIUM PHOSPHATE 4 MG: 4 INJECTION, SOLUTION INTRA-ARTICULAR; INTRALESIONAL; INTRAMUSCULAR; INTRAVENOUS; SOFT TISSUE at 07:56

## 2017-07-31 RX ADMIN — CEFAZOLIN 2 G: 1 INJECTION, POWDER, FOR SOLUTION INTRAMUSCULAR; INTRAVENOUS; PARENTERAL at 13:06

## 2017-07-31 RX ADMIN — SODIUM CHLORIDE 125 ML/HR: 900 INJECTION, SOLUTION INTRAVENOUS at 09:38

## 2017-07-31 RX ADMIN — SPIRONOLACTONE 25 MG: 25 TABLET ORAL at 11:50

## 2017-07-31 RX ADMIN — MIDAZOLAM HYDROCHLORIDE 0.5 MG: 1 INJECTION, SOLUTION INTRAMUSCULAR; INTRAVENOUS at 07:30

## 2017-07-31 RX ADMIN — CEFAZOLIN 2 G: 1 INJECTION, POWDER, FOR SOLUTION INTRAMUSCULAR; INTRAVENOUS; PARENTERAL at 22:15

## 2017-07-31 RX ADMIN — CEFAZOLIN 2 G: 1 INJECTION, POWDER, FOR SOLUTION INTRAMUSCULAR; INTRAVENOUS; PARENTERAL at 07:46

## 2017-07-31 RX ADMIN — SODIUM CHLORIDE, SODIUM LACTATE, POTASSIUM CHLORIDE, AND CALCIUM CHLORIDE: 600; 310; 30; 20 INJECTION, SOLUTION INTRAVENOUS at 08:31

## 2017-07-31 RX ADMIN — NEOSTIGMINE METHYLSULFATE 2 MG: 1 INJECTION INTRAVENOUS at 08:30

## 2017-07-31 RX ADMIN — GLYCOPYRROLATE 0.4 MG: 0.2 INJECTION INTRAMUSCULAR; INTRAVENOUS at 08:30

## 2017-07-31 RX ADMIN — FENTANYL CITRATE 50 MCG: 50 INJECTION, SOLUTION INTRAMUSCULAR; INTRAVENOUS at 07:49

## 2017-07-31 RX ADMIN — ROCURONIUM BROMIDE 20 MG: 10 INJECTION, SOLUTION INTRAVENOUS at 07:48

## 2017-07-31 RX ADMIN — ONDANSETRON 4 MG: 2 INJECTION INTRAMUSCULAR; INTRAVENOUS at 14:22

## 2017-07-31 RX ADMIN — HYDROMORPHONE HYDROCHLORIDE 0.25 MG: 1 INJECTION, SOLUTION INTRAMUSCULAR; INTRAVENOUS; SUBCUTANEOUS at 09:01

## 2017-07-31 RX ADMIN — ROCURONIUM BROMIDE 5 MG: 10 INJECTION, SOLUTION INTRAVENOUS at 07:43

## 2017-07-31 RX ADMIN — Medication 10 ML: at 13:06

## 2017-07-31 RX ADMIN — ONDANSETRON 4 MG: 2 INJECTION INTRAMUSCULAR; INTRAVENOUS at 07:56

## 2017-07-31 RX ADMIN — PROPOFOL 100 MG: 10 INJECTION, EMULSION INTRAVENOUS at 07:43

## 2017-07-31 RX ADMIN — HYDROMORPHONE HYDROCHLORIDE 0.5 MG: 1 INJECTION, SOLUTION INTRAMUSCULAR; INTRAVENOUS; SUBCUTANEOUS at 09:14

## 2017-07-31 RX ADMIN — LATANOPROST 1 DROP: 50 SOLUTION OPHTHALMIC at 22:15

## 2017-07-31 RX ADMIN — MIDAZOLAM HYDROCHLORIDE 0.5 MG: 1 INJECTION, SOLUTION INTRAMUSCULAR; INTRAVENOUS at 07:31

## 2017-07-31 RX ADMIN — FENTANYL CITRATE 25 MCG: 50 INJECTION, SOLUTION INTRAMUSCULAR; INTRAVENOUS at 08:38

## 2017-07-31 RX ADMIN — SODIUM CHLORIDE, SODIUM LACTATE, POTASSIUM CHLORIDE, AND CALCIUM CHLORIDE 150 ML/HR: 600; 310; 30; 20 INJECTION, SOLUTION INTRAVENOUS at 06:33

## 2017-07-31 RX ADMIN — SUCCINYLCHOLINE CHLORIDE 80 MG: 20 INJECTION INTRAMUSCULAR; INTRAVENOUS at 07:44

## 2017-07-31 RX ADMIN — PANTOPRAZOLE SODIUM 40 MG: 40 TABLET, DELAYED RELEASE ORAL at 11:44

## 2017-07-31 NOTE — PROGRESS NOTES
Notified Dr. Oral Honor of HR in 30's-50's. Dr. Neil Thomas verbalized ok to give spironolactone and bumex. Will continue to monitor.

## 2017-07-31 NOTE — ANESTHESIA POSTPROCEDURE EVALUATION
Post-Anesthesia Evaluation and Assessment    Patient: Amira Em MRN: [id number]  SSN: [id number]    YOB: 1931  Age: 80 y.o. Sex: female       Cardiovascular Function/Vital Signs  Visit Vitals    /52    Pulse (!) 59    Temp 36.3 °C (97.3 °F)    Resp 14    SpO2 97%       Patient is status post general anesthesia for Procedure(s):  LAPAROSCOPIC CHOLECYSTECTOMY . Nausea/Vomiting: None    Postoperative hydration reviewed and adequate. Pain:  Pain Scale 1: Numeric (0 - 10) (07/31/17 0915)  Pain Intensity 1: 4 (07/31/17 0915)   Managed    Neurological Status:   Neuro (WDL): Exceptions to WDL (07/31/17 0850)  Neuro  Neurologic State: Drowsy; Eyes open spontaneously (07/31/17 0850)  Orientation Level: Oriented to person;Oriented to situation;Oriented to place (07/31/17 0850)  Cognition: Follows commands (07/31/17 0850)  Speech: Clear (07/31/17 0850)  LUE Motor Response: Moderate (07/31/17 0850)  LLE Motor Response: Moderate (07/31/17 0850)  RUE Motor Response: Moderate (07/31/17 0850)  RLE Motor Response: Moderate (07/31/17 0850)   At baseline    Mental Status and Level of Consciousness: Arousable    Pulmonary Status:   O2 Device: Nasal cannula (07/31/17 0855)   Adequate oxygenation and airway patent    Complications related to anesthesia: None    Post-anesthesia assessment completed.  No concerns    Signed By: Pablo Man MD     July 31, 2017

## 2017-07-31 NOTE — PERIOP NOTES
Patient interviewed in pre-op, verified site and procedure with understanding as verbalized and consistent with consent. Family also present in the room.

## 2017-07-31 NOTE — PROGRESS NOTES
07/31/17 4:21 PM  CM met with patient, patient sleeping and requested CM return at a later time. CM and patient did discuss obs status, patient signed Medicare and obs letters, placed on hard chart. Patient did not she lives on the first floor of daughters home in a MIL suite, no steps to enter home. Daughter or granddaughters drive, as patient does not drive. PCP is Dr. Alexis Andersen; could not recall last appointment. CM will follow. Care Management Interventions  PCP Verified by CM:  Yes (Dr. Alexis Andersen)  Transition of Care Consult (CM Consult): Discharge Planning  Current Support Network: Relative's Home (Lilibeth Paulson 53 with daughter)  Confirm Follow Up Transport: Family  Plan discussed with Pt/Family/Caregiver: Yes  Discharge Location  Discharge Placement: Home with family assistance  TOM Ryan

## 2017-07-31 NOTE — OP NOTES
Laparoscopic Cholecystectomy    Indications: This patient presents with a symptomatic gallbladder disease and will undergo laparoscopic cholecystecomy. Proceedure: laparoscopic cholecystectomy    Pre-operative Diagnosis: Calculus of gallbladder without mention of cholecystitis or obstruction    Post-operative Diagnosis:  Calculus of bile duct without mention of cholecystitis or obstruction    Surgeon: Apolinar Marie MD     Assistants: Jorden Thomas SA    Anesthesia: General endotracheal anesthesia    ASA Class: 1      Assistant:  Surgeon(s):  Apolinar Marie MD    Procedure Details   The patient was seen again in the Holding Room. The risks, benefits, complications, treatment options, and expected outcomes were discussed with the patient. The possibilities of reaction to medication, pulmonary aspiration, perforation of viscus, bleeding, recurrent infection, finding a normal gallbladder, the need for additional procedures, failure to diagnose a condition, the possible need to convert to an open procedure, and creating a complication requiring transfusion or operation were discussed with the patient. The patient and/or family concurred with the proposed plan, giving informed consent. The patient was taken to Operating Room, identified as Diya Davis and the procedure verified as Laparoscopic Cholecystectomy with possible Intraoperative Cholangiograms. A Time Out was held and the above information confirmed. Prior to the induction of general anesthesia,  antibiotic prophylaxis was administered. General endotracheal anesthesia was then administered and tolerated well. After the induction, the abdomen was prepped in the usual sterile fashion. The abdomen was entered in the right upper quadrant in the midclavicular line just below the costal margin. At this site, 3 mL of 0.5% Marcaine was injected in the subcutaneous space. A 5-mm incision made with an 11-blade scalpel.  Then a 5-mm Xcel trocar containing 5-mm 0-degree laparoscope was used to dissect through the layers of the abdominal wall under direct laparoscopic vision. Entry into the abdomen was confirmed visually. Once within the abdomen, insufflation was provided through this trocar to a pressure of 15 mmHg. The patient tolerated insufflation well and there were no apparent complications. A 360-degree evaluation of the abdomen was then performed from this trocar  site. There were no peritoneal implants identified. There were no abnormalities on the surface of the liver. Attention was then focused at the umbilicus. Marcaine plain 0.5% 3 mL was injected in the subcutaneous space, as well as the preperitoneal space. A 12-mm curvilinear incision was made at the base of the umbilicus, and then a 40-BC Xcel trocar was inserted under direct laparoscopic vision into the abdominal cavity. The camera was then switched to this trocar position. The patient was then placed in reverse Trendelenburg with right side up. Attention was focused at the right upper quadrant, and 2 additional trocars were placed. One 5-mm trocar was placed in the epigastrium just below the xyphyoid The third 5-mm trocar was placed in the anterior axillary line just below the costal margin. At both sites, 3 mL of 0.5% Marcaine was injected into the skin and the preperitoneal space, a 5-mm incision made, and then the 5-mm trocar inserted under direct laparoscopic vision. The fundus of the gallbladder was then grasped and retracted over the dome of the liver. The infundibulum was grasped and retracted to the right and inferiorly. Blunt dissection was used to dissect out the space between the infundibulum and the gallbladder bed, and then blunt dissection was used to dissect out the cystic duct and  cystic artery. A critical view was obtained where these were the only 2 structures entering the gallbladder.  Once this critical view was obtained, a clip was placed at the base of the infundibulum. A choledochotomy was made in the very distal cystic duct. Then a 14-gauge angiocatheter was used to introduce an Arrow cholangiogram catheter into the abdominal cavity. The catheter was irrigated with saline and attempts were made to insert it into the choledochotomy. The duct seemed to be to narrow to accommodate the catheter so attempts were aborted. Two clips were placed on the proximal cystic duct and then the choledochotomy  completed with laparoscopic scissors. The artery was clipped and divided in a similar fashion. The infundibulum of the gallbladder was then retracted towards the anterior abdominal wall and the gallbladder removed from the gallbladder fossa with electrocautery. The gallbladder was then placed over the right lobe of the liver. An Endocatch bag was then inserted through the umbilical trochar. The gallbladder was then placed in the EndoCatch bag and removed through the umbilical trocar site. The gallbladder was then sent to Pathology. Next, the right upper quadrant was inspected. The gallbladder fossa appeared dry. There was no evidence of any bleeding. The cystic duct remnant had 2 clips across it, and an Endoloop, with no evidence of any leakage of bile. The cystic artery remnant was inspected. It had 2 clips across it with no evidence of any bleeding. The right upper quadrant was then irrigated with 300 mL of normal saline. The irrigation came back  clear. Attention was then focused on the umbilical trocar site, and the fascia at  this umbilical trocar site was closed with a transfascial sutures of 0  Vicryl. This was done using an Endo Close device under direct laparoscopic  vision. Once this suture was tied, there was no loss of pneumoperitoneum through the umbilical trocar site and no palpable defect, indicating adequate closure of the trocar site. Pneumoinsufflation was then vented through the 5-mm trocar sites.  The trocars were then removed and the skin at all trocar sites closed with 4-0 Monocryl and Dermabond. The patient was extubated in the room and taken to the [address] [facility]  Unit in stable condition. Instrument, sponge, and needle counts were correct x2. Findings:   Cholelithiasis    Estimated Blood Loss:  Minimal           Drains: none           Specimens: Gallbladder             Complications:  None; patient tolerated the procedure well. Disposition: PACU - hemodynamically stable.            Condition: stable    Attending Attestation: I was present and scrubbed for the entire procedure

## 2017-07-31 NOTE — IP AVS SNAPSHOT
[address] 
[phone] Patient: Austin Cantu MRN: [id number] LWS:0/88/1285 You are allergic to the following Allergen Reactions Meperidine Hives Other reaction(s): Hives Ace Inhibitors Unknown (comments) Codeine Unknown (comments) Miralax (Polyethylene Glycol 3350) Nausea and Vomiting Other Plant, Animal, Environmental Rash Bilateral Hearing Aids cause severe reaction per pt report Percocet (Oxycodone-Acetaminophen) Unknown (comments) Statins-Hmg-Coa Reductase Inhibitors Unknown (comments) Sular (Nisoldipine) Rash Zetia (Ezetimibe) Unknown (comments) Recent Documentation Breastfeeding? OB Status Smoking Status No Hysterectomy Never Smoker Emergency Contacts Name Discharge Info Relation Home Work Mobile NewmanDalia nguyen DISCHARGE CAREGIVER [3] Child [2] [phone] About your hospitalization You were admitted on:  July 31, 2017 You last received care in the:  [facility] 4M POST SURG ORT 1 You were discharged on:  August 1, 2017 Unit phone number:  [phone] Why you were hospitalized Your primary diagnosis was:  Not on File Your diagnoses also included:  Gallstones, S/P Laparoscopic Cholecystectomy Providers Seen During Your Hospitalizations Provider Role Specialty Primary office phone Wilbur Gutierres MD Attending Provider General Surgery [phone] Your Primary Care Physician (PCP) Primary Care Physician Office Phone Office Fax Kurtis Cowden [phone] Follow-up Information Follow up With Details Comments Contact Info Herbert Delatorre MD   [address] [facility] [address] [place] [address] 
[phone] Your Appointments  Monday August 14, 2017  2:30 PM EDT  
POST OP with Wilbur Gutierres MD  
 [address] [facility] Surgery ([facility]) [address]  
[phone] Current Discharge Medication List  
  
START taking these medications Dose & Instructions Dispensing Information Comments Morning Noon Evening Bedtime HYDROcodone-acetaminophen 5-325 mg per tablet Commonly known as:  Gloria Boot Your last dose was: Your next dose is:    
   
   
 Dose:  1 Tab Take 1 Tab by mouth every four (4) hours as needed for Pain. Max Daily Amount: 6 Tabs. Quantity:  40 Tab Refills:  0 CONTINUE these medications which have NOT CHANGED Dose & Instructions Dispensing Information Comments Morning Noon Evening Bedtime ALDACTONE 25 mg tablet Generic drug:  spironolactone Your last dose was: Your next dose is:    
   
   
 Dose:  25 mg Take 25 mg by mouth daily. Refills:  0  
     
   
   
   
  
 bumetanide 1 mg tablet Commonly known as:  Spurgeon Hora Your last dose was: Your next dose is:    
   
   
 Dose:  0.5 mg Take 0.5 mg by mouth daily. Refills:  0  
     
   
   
   
  
 metoprolol succinate 100 mg tablet Commonly known as:  TOPROL-XL Your last dose was: Your next dose is:    
   
   
 Dose:  100 mg Take 100 mg by mouth daily. Refills:  0  
     
   
   
   
  
 omeprazole 20 mg capsule Commonly known as:  PRILOSEC Your last dose was: Your next dose is:    
   
   
 Dose:  20 mg Take 1 Cap by mouth daily. Quantity:  90 Cap Refills:  1 OTHER Your last dose was: Your next dose is:    
   
   
 Dose:  1 Cap Take 1 Cap by mouth daily. Refills:  0 QUEtiapine 25 mg tablet Commonly known as:  SEROquel Your last dose was: Your next dose is: TAKE 1 & 1/2 BY MOUTH NIGHTLY Quantity:  135 Tab Refills:  1 XALATAN 0.005 % ophthalmic solution Generic drug:  latanoprost  
   
Your last dose was: Your next dose is:    
   
   
 Dose:  1 Drop Administer 1 Drop to both eyes nightly. Refills:  0 Where to Get Your Medications Information on where to get these meds will be given to you by the nurse or doctor. ! Ask your nurse or doctor about these medications HYDROcodone-acetaminophen 5-325 mg per tablet Discharge Instructions Cholecystectomy: What to Expect at [facility] Your Recovery After your surgery, it is normal to feel weak and tired for several days after you return home. Your belly may be swollen. If you had laparoscopic surgery, you may also have pain in your shoulder for about 24 hours. You may have gas or need to burp a lot at first, and a few people get diarrhea. The diarrhea usually goes away in 2 to 4 weeks, but it may last longer. How quickly you recover depends on whether you had a laparoscopic or open surgery. · For a laparoscopic surgery, most people can go back to work or their normal routine in 1 to 2 weeks, but it may take longer, depending on the type of work you do. · For an open surgery, it will probably take 4 to 6 weeks before you get back to your normal routine. This care sheet gives you a general idea about how long it will take for you to recover. However, each person recovers at a different pace. Follow the steps below to get better as quickly as possible. How can you care for yourself at home? Activity · Rest when you feel tired. Getting enough sleep will help you recover. · Try to walk each day. Start out by walking a little more than you did the day before. Gradually increase the amount you walk. Walking boosts blood flow and helps prevent pneumonia and constipation.  
· For about 2 to 4 weeks, avoid lifting anything that would make you strain. This may include a child, heavy grocery bags and milk containers, a heavy briefcase or backpack, cat litter or dog food bags, or a vacuum . · Avoid strenuous activities, such as biking, jogging, weightlifting, and aerobic exercise, until your doctor says it is okay. · You may shower 24 to 48 hours after surgery, if your doctor okays it. Pat the cut (incision) dry. Do not take a bath for the first 2 weeks, or until your doctor tells you it is okay. · You may drive when you are no longer taking pain medicine and can quickly move your foot from the gas pedal to the brake. You must also be able to sit comfortably for a long period of time, even if you do not plan to go far. You might get caught in traffic. · For a laparoscopic surgery, most people can go back to work or their normal routine in 1 to 2 weeks, but it may take longer. For an open surgery, it will probably take 4 to 6 weeks before you get back to your normal routine. · Your doctor will tell you when you can have sex again. Diet · Eat smaller meals more often instead of fewer larger meals. You can eat a normal diet, but avoid eating fatty foods for about 1 month. Fatty foods include hamburger, whole milk, cheese, and many snack foods. If your stomach is upset, try bland, low-fat foods like plain rice, broiled chicken, toast, and yogurt. · Drink plenty of fluids (unless your doctor tells you not to). · If you have diarrhea, try avoiding spicy foods, dairy products, fatty foods, and alcohol. You can also watch to see if specific foods cause it, and stop eating them. If the diarrhea continues for more than 2 weeks, talk to your doctor. · You may notice that your bowel movements are not regular right after your surgery. This is common. Try to avoid constipation and straining with bowel movements. You may want to take a fiber supplement every day.  If you have not had a bowel movement after a couple of days, ask your doctor about taking a mild laxative. Medicines · Your doctor will tell you if and when you can restart your medicines. He or she will also give you instructions about taking any new medicines. · If you take blood thinners, such as warfarin (Coumadin), clopidogrel (Plavix), or aspirin, be sure to talk to your doctor. He or she will tell you if and when to start taking those medicines again. Make sure that you understand exactly what your doctor wants you to do. · Take pain medicines exactly as directed. ¨ If the doctor gave you a prescription medicine for pain, take it as prescribed. ¨ If you are not taking a prescription pain medicine, take an over-the-counter medicine such as acetaminophen (Tylenol), ibuprofen (Advil, Motrin), or naproxen (Aleve). Read and follow all instructions on the label. ¨ Do not take two or more pain medicines at the same time unless the doctor told you to. Many pain medicines contain acetaminophen, which is Tylenol. Too much Tylenol can be harmful. · If you think your pain medicine is making you sick to your stomach: 
¨ Take your medicine after meals (unless your doctor tells you not to). ¨ Ask your doctor for a different pain medicine. · If your doctor prescribed antibiotics, take them as directed. Do not stop taking them just because you feel better. You need to take the full course of antibiotics. Incision care · If you have strips of tape on the incision, or cut, leave the tape on for a week or until it falls off. · After 24 to 48 hours, wash the area daily with warm, soapy water, and pat it dry. · You may have staples to hold the cut together. Keep them dry until your doctor takes them out. This is usually in 7 to 10 days. · Keep the area clean and dry. You may cover it with a gauze bandage if it weeps or rubs against clothing. Change the bandage every day. Ice · To reduce swelling and pain, put ice or a cold pack on your belly for 10 to 20 minutes at a time. Do this every 1 to 2 hours. Put a thin cloth between the ice and your skin. Follow-up care is a key part of your treatment and safety. Be sure to make and go to all appointments, and call your doctor if you are having problems. It's also a good idea to know your test results and keep a list of the medicines you take. When should you call for help? Call 911 anytime you think you may need emergency care. For example, call if: 
· You passed out (lost consciousness). · You have severe trouble breathing. · You have sudden chest pain and shortness of breath, or you cough up blood. Call your doctor now or seek immediate medical care if: 
· You are sick to your stomach and cannot drink fluids. · You have pain that does not get better when you take your pain medicine. · You have signs of infection, such as: 
¨ Increased pain, swelling, warmth, or redness. ¨ Red streaks leading from the incision. ¨ Pus draining from the incision. ¨ Swollen lymph nodes in your neck, armpits, or groin. ¨ A fever. · Your urine turns dark brown or your stool is light-colored or bobby-colored. · Your skin or the whites of your eyes turn yellow. · Bright red blood has soaked through a large bandage over your incision. · You have signs of a blood clot, such as: 
¨ Pain in your calf, back of knee, thigh, or groin. ¨ Redness and swelling in your leg or groin. · You have trouble passing urine or stool, especially if you have mild pain or swelling in your lower belly. Watch closely for any changes in your health, and be sure to contact your doctor if: 
· You had a laparoscopic surgery and your shoulder pain lasts more than 24 hours. · You do not have a bowel movement after taking a laxative. Where can you learn more? Go to http://lisbet-navya.info/. Enter [phone] in the search box to learn more about \"Cholecystectomy: What to Expect at Home. \" Current as of: August 9, 2016 Content Version: 11.3 © 2902-3007 [employer]994, Incorporated. Care instructions adapted under license by [employer] (which disclaims liability or warranty for this information). If you have questions about a medical condition or this instruction, always ask your healthcare professional. [address] any warranty or liability for your use of this information. Kaur Chan MD, PhD, [facility] General Surgery at [facility] [address], [address] [facility] 
[phone] Fax [phone] Discharge Orders None ACO Transitions of Care Introducing [employer] [address] Gary offers a voluntary care coordination program to provide high quality service and care to [facility] fee-for-service beneficiaries. Gerard Sonali was designed to help you enhance your health and well-being through the following services: ? Transitions of Care  support for individuals who are transitioning from one care setting to another (example: Hospital to home). ? Chronic and Complex Care Coordination  support for individuals and caregivers of those with serious or chronic illnesses or with more than one chronic (ongoing) condition and those who take a number of different medications. If you meet specific medical criteria, a [facility] [address] may call you directly to coordinate your care with your primary care physician and your other care providers. For questions about the [facility] programs, please, contact your physicians office. For general questions or additional information about Accountable Care Organizations: 
Please visit www.medicare.gov/acos. html or call 1-800-MEDICARE (7-462.175.8258) TTY users should call 4-753.689.1211. Introducing [facility]!    
 Maddison Arrieta introduces [employer] patient portal. Now you can access parts of your medical record, email your doctor's office, and request medication refills online. 1. In your internet browser, go to https://[employer]. [employer] 2. Click on the First Time User? Click Here link in the Sign In box. You will see the New Member Sign Up page. 3. Enter your [employer] Access Code exactly as it appears below. You will not need to use this code after youve completed the sign-up process. If you do not sign up before the expiration date, you must request a new code. · [employer] Access Code: [email or web address] Expires: 10/29/2017  5:41 AM 
 
4. Enter the last four digits of your Social Security Number (xxxx) and Date of Birth (mm/dd/yyyy) as indicated and click Submit. You will be taken to the next sign-up page. 5. Create a [employer] ID. This will be your [employer] login ID and cannot be changed, so think of one that is secure and easy to remember. 6. Create a [employer] password. You can change your password at any time. 7. Enter your Password Reset Question and Answer. This can be used at a later time if you forget your password. 8. Enter your e-mail address. You will receive e-mail notification when new information is available in [address] 9. Click Sign Up. You can now view and download portions of your medical record. 10. Click the Download Summary menu link to download a portable copy of your medical information. If you have questions, please visit the Frequently Asked Questions section of the [employer] website. Remember, [employer] is NOT to be used for urgent needs. For medical emergencies, dial 911. Now available from your iPhone and Android! General Information Please provide this summary of care documentation to your next provider. Patient Signature:  ____________________________________________________________ Date:  ____________________________________________________________  
  
Zoltan Cart  Provider Signature: ____________________________________________________________ Date:  ____________________________________________________________

## 2017-07-31 NOTE — ANESTHESIA PREPROCEDURE EVALUATION
Anesthetic History   No history of anesthetic complications            Review of Systems / Medical History  Patient summary reviewed, nursing notes reviewed and pertinent labs reviewed    Pulmonary        Sleep apnea           Neuro/Psych         TIA and psychiatric history     Cardiovascular    Hypertension          Pacemaker    Exercise tolerance: >4 METS     GI/Hepatic/Renal     GERD           Endo/Other        Arthritis  Pertinent negatives: Diabetes: glucose intolerance.    Other Findings            Physical Exam    Airway  Mallampati: III  TM Distance: 4 - 6 cm  Neck ROM: normal range of motion   Mouth opening: Normal     Cardiovascular    Rhythm: regular  Rate: normal         Dental    Dentition: Lower dentition intact and Upper dentition intact     Pulmonary  Breath sounds clear to auscultation               Abdominal  GI exam deferred       Other Findings            Anesthetic Plan    ASA: 3  Anesthesia type: general          Induction: Intravenous  Anesthetic plan and risks discussed with: Patient

## 2017-07-31 NOTE — PERIOP NOTES
TRANSFER - OUT REPORT:    [facility] on Yocasta Ayala  being transferred to [facility](unit) for routine post - op       Report consisted of patients Situation, Background, Assessment and   Recommendations(SBAR). Information from the following report(s) SBAR, OR Summary, Intake/Output and Cardiac Rhythm Paced, PVC was reviewed with the receiving nurse. Opportunity for questions and clarification was provided. Patient transported with:   O2 @ 2 liters  Registered Nurse     RN and family at bedside.  Stable

## 2017-07-31 NOTE — PROGRESS NOTES
Bedside and Verbal shift change report given to Fred Perez (oncoming nurse) by Sebastien Elmore (offgoing nurse). Report included the following information SBAR, Kardex, Intake/Output, MAR and Recent Results.

## 2017-07-31 NOTE — IP AVS SNAPSHOT
303 [facility] 
 
 
 [address] 
[phone] Patient: Kiko Carlson MRN: [id number] NLQ:5/35/[birth year]68 Current Discharge Medication List  
  
START taking these medications Dose & Instructions Dispensing Information Comments Morning Noon Evening Bedtime HYDROcodone-acetaminophen 5-325 mg per tablet Commonly known as:  Rhenda Villagran Your last dose was: Your next dose is:    
   
   
 Dose:  1 Tab Take 1 Tab by mouth every four (4) hours as needed for Pain. Max Daily Amount: 6 Tabs. Quantity:  40 Tab Refills:  0 CONTINUE these medications which have NOT CHANGED Dose & Instructions Dispensing Information Comments Morning Noon Evening Bedtime ALDACTONE 25 mg tablet Generic drug:  spironolactone Your last dose was: Your next dose is:    
   
   
 Dose:  25 mg Take 25 mg by mouth daily. Refills:  0  
     
   
   
   
  
 bumetanide 1 mg tablet Commonly known as:  Mira Noun Your last dose was: Your next dose is:    
   
   
 Dose:  0.5 mg Take 0.5 mg by mouth daily. Refills:  0  
     
   
   
   
  
 metoprolol succinate 100 mg tablet Commonly known as:  TOPROL-XL Your last dose was: Your next dose is:    
   
   
 Dose:  100 mg Take 100 mg by mouth daily. Refills:  0  
     
   
   
   
  
 omeprazole 20 mg capsule Commonly known as:  PRILOSEC Your last dose was: Your next dose is:    
   
   
 Dose:  20 mg Take 1 Cap by mouth daily. Quantity:  90 Cap Refills:  1 OTHER Your last dose was: Your next dose is:    
   
   
 Dose:  1 Cap Take 1 Cap by mouth daily. Refills:  0 QUEtiapine 25 mg tablet Commonly known as:  SEROquel Your last dose was: Your next dose is: TAKE 1 & 1/2 BY MOUTH NIGHTLY Quantity:  135 Tab Refills:  1 XALATAN 0.005 % ophthalmic solution Generic drug:  latanoprost  
   
Your last dose was: Your next dose is:    
   
   
 Dose:  1 Drop Administer 1 Drop to both eyes nightly. Refills:  0 Where to Get Your Medications Information on where to get these meds will be given to you by the nurse or doctor. ! Ask your nurse or doctor about these medications HYDROcodone-acetaminophen 5-325 mg per tablet

## 2017-07-31 NOTE — H&P (VIEW-ONLY)
Cholelithasis Consultation      Subjective:      Maine Kumari is an 80 y.o.  female who was referred by:  Elihu Holter, MD for evaluation of chronic cholecystitis. Patient complains of epigastric pain. It is described as sharp and rated as moderate. It has been going on for about 6 weeks. Eating makes the pain worse. Nothing makes it better. It is associated with nausea and reflux. Patient denies fevers, chills, malaise and change in bowel habits. Past Medical History:   Diagnosis Date    Anemia     Bradycardia     Depression     \"better since moving into my daughters mother-in-law suite\"    Diabetes ([facility] [address]     \"border\" per patient report    DJD (degenerative joint disease), lumbar     GERD (gastroesophageal reflux disease)     stricture.  Glaucoma     Glucose intolerance     [place] (hard of hearing)     HTN (hypertension)     Keratoconjunctivitis     sicca    Macular degeneration     macular degeneration vs ooptic neuropathy    ABAD (obstructive sleep apnea)     decided not to use d/t frequent bathroom trips at night. didn't help    Osteopenia     Otitis media     chronic    Stroke [facility]) 2011    Mini stroke \"in the past, affected my eyes\" Need glasses for reading    Venous insufficiency 10/10/2011     Family History   Problem Relation Age of Onset    Heart Failure Mother     Heart Disease Father     Stroke Brother      Cannot display prior to admission medications because the patient has not been admitted in this contact.      Allergies   Allergen Reactions    Meperidine Hives     Other reaction(s): Hives    Ace Inhibitors Unknown (comments)    Codeine Unknown (comments)    Miralax [Polyethylene Glycol 3350] Nausea and Vomiting    Other Plant, Animal, Environmental Rash     Bilateral Hearing Aids cause severe reaction per pt report    Percocet [Oxycodone-Acetaminophen] Unknown (comments)    Statins-Hmg-Coa Reductase Inhibitors Unknown (comments)    Sular [Nisoldipine] Rash    Zetia [Ezetimibe] Unknown (comments)     Social History     Social History    Marital status:      Spouse name: N/A    Number of children: N/A    Years of education: N/A     Occupational History    Not on file. Social History Main Topics    Smoking status: Never Smoker    Smokeless tobacco: Never Used    Alcohol use No    Drug use: No    Sexual activity: Not Currently     Partners: Male     Other Topics Concern    Not on file     Social History Narrative       Review of Systems: A comprehensive review of systems was negative except for that written in the HPI. Objective:        Visit Vitals    /54 (BP 1 Location: Left arm, BP Patient Position: Sitting)    Pulse (!) 53    Temp 98.7 °F (37.1 °C) (Oral)    Resp 14    Ht 5' (1.524 m)    Wt 174 lb (78.9 kg)    SpO2 96%    BMI 33.98 kg/m2        Visit Vitals    /54 (BP 1 Location: Left arm, BP Patient Position: Sitting)    Pulse (!) 53    Temp 98.7 °F (37.1 °C) (Oral)    Resp 14    Ht 5' (1.524 m)    Wt 174 lb (78.9 kg)    SpO2 96%    BMI 33.98 kg/m2     General appearance: alert, cooperative, no distress, appears stated age, mildly obese  Head: Normocephalic, without obvious abnormality, atraumatic  Eyes: conjunctivae/corneas clear., EOM's intact. Lungs: clear to auscultation bilaterally  Heart: regular rate and rhythm, S1, S2 normal, no murmur, click, rub or gallop  Abdomen: soft, non-tender. Bowel sounds normal. No masses,  no organomegaly  Neurologic: Alert and oriented X 3, . Normal symmetric reflexes. Normal coordination and gait    Imaging:  images and reports reviewed    Lab Review:      No results found for this or any previous visit (from the past 24 hour(s)). Labs and radiology images and reports reviewed      Assessment:     Likely chronic Cholecystitis    Plan/Recommendations/Medical Decision Makin.  I recommend laparoscopic cholecystectomy with possible cholangiogram Treatment alternatives were discussed. 2. Discussed aspects of surgical intervention, methods, risks (including by not limited to infection, bleeding, retained gallstones in the abdominal cavity and/or the main bile ducts, and injury to adjacent structures including the biliary system, common bile duct, and intestines.)  The patient understands the risks, any and all questions were answered to the patient's satisfaction. 3. Patient does wish to proceed with surgery. Surgery : laparoscopic cholecystectomy, cholangiogram, possible open     Length:  1 hours    Diagnosis :     ICD-10-CM ICD-9-CM   1.  Chronic cholecystitis K81.1 575.11       Anesthesia : general anesthesia    Surgery Type: observation    Position:  supine    Hospital : [facility]    Blood thinner NO

## 2017-07-31 NOTE — INTERVAL H&P NOTE
H&P Update:  Zenaida Ward was seen and examined. History and physical has been reviewed. The patient has been examined.  There have been no significant clinical changes since the completion of the originally dated History and Physical.    Signed By: Clint Schneider MD     July 31, 2017 7:10 AM

## 2017-07-31 NOTE — PROGRESS NOTES
Primary Nurse Geovani Treviño RN and Elva Villagran RN performed a dual skin assessment on this patient Impairment noted- see wound doc flow sheet  Christian score is 20    Surgical abdominal lap sites. Reddened area within anterior abdominal folds. Otherwise, skin clean, dry, and intact.

## 2017-08-01 ENCOUNTER — TELEPHONE (OUTPATIENT)
Dept: SURGERY | Age: 82
End: 2017-08-01

## 2017-08-01 VITALS
HEART RATE: 56 BPM | OXYGEN SATURATION: 94 % | DIASTOLIC BLOOD PRESSURE: 69 MMHG | RESPIRATION RATE: 18 BRPM | TEMPERATURE: 98.4 F | SYSTOLIC BLOOD PRESSURE: 163 MMHG

## 2017-08-01 PROBLEM — Z90.49 S/P LAPAROSCOPIC CHOLECYSTECTOMY: Status: ACTIVE | Noted: 2017-08-01

## 2017-08-01 LAB
ALBUMIN SERPL BCP-MCNC: 3 G/DL (ref 3.5–5)
ALBUMIN/GLOB SERPL: 1.1 {RATIO} (ref 1.1–2.2)
ALP SERPL-CCNC: 63 U/L (ref 45–117)
ALT SERPL-CCNC: 14 U/L (ref 12–78)
ANION GAP BLD CALC-SCNC: 2 MMOL/L (ref 5–15)
AST SERPL W P-5'-P-CCNC: 19 U/L (ref 15–37)
BILIRUB SERPL-MCNC: 0.4 MG/DL (ref 0.2–1)
BUN SERPL-MCNC: 14 MG/DL (ref 6–20)
BUN/CREAT SERPL: 15 (ref 12–20)
CALCIUM SERPL-MCNC: 8.9 MG/DL (ref 8.5–10.1)
CHLORIDE SERPL-SCNC: 106 MMOL/L (ref 97–108)
CO2 SERPL-SCNC: 33 MMOL/L (ref 21–32)
CREAT SERPL-MCNC: 0.96 MG/DL (ref 0.55–1.02)
ERYTHROCYTE [DISTWIDTH] IN BLOOD BY AUTOMATED COUNT: 13.1 % (ref 11.5–14.5)
GLOBULIN SER CALC-MCNC: 2.7 G/DL (ref 2–4)
GLUCOSE SERPL-MCNC: 156 MG/DL (ref 65–100)
HCT VFR BLD AUTO: 42.3 % (ref 35–47)
HGB BLD-MCNC: 13.8 G/DL (ref 11.5–16)
MCH RBC QN AUTO: 29.2 PG (ref 26–34)
MCHC RBC AUTO-ENTMCNC: 32.6 G/DL (ref 30–36.5)
MCV RBC AUTO: 89.6 FL (ref 80–99)
PLATELET # BLD AUTO: 149 K/UL (ref 150–400)
POTASSIUM SERPL-SCNC: 5 MMOL/L (ref 3.5–5.1)
PROT SERPL-MCNC: 5.7 G/DL (ref 6.4–8.2)
RBC # BLD AUTO: 4.72 M/UL (ref 3.8–5.2)
SODIUM SERPL-SCNC: 141 MMOL/L (ref 136–145)
WBC # BLD AUTO: 9.3 K/UL (ref 3.6–11)

## 2017-08-01 PROCEDURE — 36415 COLL VENOUS BLD VENIPUNCTURE: CPT | Performed by: SURGERY

## 2017-08-01 PROCEDURE — 74011250636 HC RX REV CODE- 250/636: Performed by: SURGERY

## 2017-08-01 PROCEDURE — 80053 COMPREHEN METABOLIC PANEL: CPT | Performed by: SURGERY

## 2017-08-01 PROCEDURE — 85027 COMPLETE CBC AUTOMATED: CPT | Performed by: SURGERY

## 2017-08-01 PROCEDURE — 99218 HC RM OBSERVATION: CPT

## 2017-08-01 PROCEDURE — 74011250637 HC RX REV CODE- 250/637: Performed by: SURGERY

## 2017-08-01 RX ADMIN — METOPROLOL SUCCINATE 100 MG: 50 TABLET, FILM COATED, EXTENDED RELEASE ORAL at 09:13

## 2017-08-01 RX ADMIN — PANTOPRAZOLE SODIUM 40 MG: 40 TABLET, DELAYED RELEASE ORAL at 09:13

## 2017-08-01 RX ADMIN — IBUPROFEN 400 MG: 400 TABLET ORAL at 02:24

## 2017-08-01 RX ADMIN — Medication 10 ML: at 05:54

## 2017-08-01 RX ADMIN — BUMETANIDE 0.5 MG: 1 TABLET ORAL at 09:13

## 2017-08-01 RX ADMIN — SPIRONOLACTONE 25 MG: 25 TABLET ORAL at 09:13

## 2017-08-01 RX ADMIN — CEFAZOLIN 2 G: 1 INJECTION, POWDER, FOR SOLUTION INTRAMUSCULAR; INTRAVENOUS; PARENTERAL at 05:54

## 2017-08-01 NOTE — PROGRESS NOTES
Progress Note    Patient: Geri Singh MRN: [id number]  SSN: [id number]    YOB: 1931  Age: 80 y.o. Sex: female      Admit Date: 2017    1 Day Post-Op    Procedure:  Procedure(s):  LAPAROSCOPIC CHOLECYSTECTOMY     Subjective:     Mrs. Handy Cornea c/o mild incisional pain. She tolerated her diet. Objective:     Visit Vitals    /69 (BP 1 Location: Left arm, BP Patient Position: At rest)    Pulse (!) 56    Temp 98.4 °F (36.9 °C)    Resp 18    SpO2 94%    Breastfeeding No       Temp (24hrs), Av.8 °F (36.6 °C), Min:96.7 °F (35.9 °C), Max:98.6 °F (37 °C)      Physical Exam:    GENERAL: alert, cooperative, no distress, EYE: negative findings: anicteric sclera, ABDOMEN: Soft, NT, ND. The incisions are clean, dry, and intact.     Lab Review:   BMP:   Lab Results   Component Value Date/Time     2017 12:10 AM    K 5.0 2017 12:10 AM     2017 12:10 AM    CO2 33 (H) 2017 12:10 AM    AGAP 2 (L) 2017 12:10 AM     (H) 2017 12:10 AM    BUN 14 2017 12:10 AM    CREA 0.96 2017 12:10 AM    GFRAA >60 2017 12:10 AM    GFRNA 55 (L) 2017 12:10 AM     CMP:   Lab Results   Component Value Date/Time     2017 12:10 AM    K 5.0 2017 12:10 AM     2017 12:10 AM    CO2 33 (H) 2017 12:10 AM    AGAP 2 (L) 2017 12:10 AM     (H) 2017 12:10 AM    BUN 14 2017 12:10 AM    CREA 0.96 2017 12:10 AM    GFRAA >60 2017 12:10 AM    GFRNA 55 (L) 2017 12:10 AM    CA 8.9 2017 12:10 AM    ALB 3.0 (L) 2017 12:10 AM    TP 5.7 (L) 2017 12:10 AM    GLOB 2.7 2017 12:10 AM    AGRAT 1.1 2017 12:10 AM    SGOT 19 2017 12:10 AM    ALT 14 2017 12:10 AM     CBC:   Lab Results   Component Value Date/Time    WBC 9.3 2017 12:10 AM    HGB 13.8 2017 12:10 AM    HCT 42.3 2017 12:10 AM     (L) 2017 12:10 AM Assessment:     Hospital Problems  Date Reviewed: 7/15/2017          Codes Class Noted POA    Gallstones ICD-10-CM: K80.20  ICD-9-CM: 574.20  7/31/2017 Unknown              Plan/Recommendations/Medical Decision Making:     D/C home.     Signed By: Faizan Schultz MD     August 1, 2017

## 2017-08-01 NOTE — PROGRESS NOTES
Discharge instructions given to patient. Grand-daughter present (a nurse in our ICU). Patient able to verbalize incision care, and incision inspection for changes in color and drainage. Incisions are intact without redness or oozing. Patient has glasses in her suitcase. Patient and grand-daughter state they do not have any further valuables.   Aurora Ahn RN

## 2017-08-01 NOTE — PROGRESS NOTES
Bedside and Verbal shift change report given to Sigrid Dodd [facility] (oncoming nurse) by Ivone Guzman RN (offgoing nurse). Report included the following information SBAR, Kardex, Intake/Output, MAR, Accordion and Recent Results.

## 2017-08-01 NOTE — DISCHARGE INSTRUCTIONS
Cholecystectomy: What to Expect at [address] [place] [address]  After your surgery, it is normal to feel weak and tired for several days after you return home. Your belly may be swollen. If you had laparoscopic surgery, you may also have pain in your shoulder for about 24 hours. You may have gas or need to burp a lot at first, and a few people get diarrhea. The diarrhea usually goes away in 2 to 4 weeks, but it may last longer. How quickly you recover depends on whether you had a laparoscopic or open surgery. · For a laparoscopic surgery, most people can go back to work or their normal routine in 1 to 2 weeks, but it may take longer, depending on the type of work you do. · For an open surgery, it will probably take 4 to 6 weeks before you get back to your normal routine. This care sheet gives you a general idea about how long it will take for you to recover. However, each person recovers at a different pace. Follow the steps below to get better as quickly as possible. How can you care for yourself at home? Activity  · Rest when you feel tired. Getting enough sleep will help you recover. · Try to walk each day. Start out by walking a little more than you did the day before. Gradually increase the amount you walk. Walking boosts blood flow and helps prevent pneumonia and constipation. · For about 2 to 4 weeks, avoid lifting anything that would make you strain. This may include a child, heavy grocery bags and milk containers, a heavy briefcase or backpack, cat litter or dog food bags, or a vacuum . · Avoid strenuous activities, such as biking, jogging, weightlifting, and aerobic exercise, until your doctor says it is okay. · You may shower 24 to 48 hours after surgery, if your doctor okays it. Pat the cut (incision) dry. Do not take a bath for the first 2 weeks, or until your doctor tells you it is okay.   · You may drive when you are no longer taking pain medicine and can quickly move your foot from the gas pedal to the brake. You must also be able to sit comfortably for a long period of time, even if you do not plan to go far. You might get caught in traffic. · For a laparoscopic surgery, most people can go back to work or their normal routine in 1 to 2 weeks, but it may take longer. For an open surgery, it will probably take 4 to 6 weeks before you get back to your normal routine. · Your doctor will tell you when you can have sex again. Diet  · Eat smaller meals more often instead of fewer larger meals. You can eat a normal diet, but avoid eating fatty foods for about 1 month. Fatty foods include hamburger, whole milk, cheese, and many snack foods. If your stomach is upset, try bland, low-fat foods like plain rice, broiled chicken, toast, and yogurt. · Drink plenty of fluids (unless your doctor tells you not to). · If you have diarrhea, try avoiding spicy foods, dairy products, fatty foods, and alcohol. You can also watch to see if specific foods cause it, and stop eating them. If the diarrhea continues for more than 2 weeks, talk to your doctor. · You may notice that your bowel movements are not regular right after your surgery. This is common. Try to avoid constipation and straining with bowel movements. You may want to take a fiber supplement every day. If you have not had a bowel movement after a couple of days, ask your doctor about taking a mild laxative. Medicines  · Your doctor will tell you if and when you can restart your medicines. He or she will also give you instructions about taking any new medicines. · If you take blood thinners, such as warfarin (Coumadin), clopidogrel (Plavix), or aspirin, be sure to talk to your doctor. He or she will tell you if and when to start taking those medicines again. Make sure that you understand exactly what your doctor wants you to do. · Take pain medicines exactly as directed.   ¨ If the doctor gave you a prescription medicine for pain, take it as prescribed. ¨ If you are not taking a prescription pain medicine, take an over-the-counter medicine such as acetaminophen (Tylenol), ibuprofen (Advil, Motrin), or naproxen (Aleve). Read and follow all instructions on the label. ¨ Do not take two or more pain medicines at the same time unless the doctor told you to. Many pain medicines contain acetaminophen, which is Tylenol. Too much Tylenol can be harmful. · If you think your pain medicine is making you sick to your stomach:  ¨ Take your medicine after meals (unless your doctor tells you not to). ¨ Ask your doctor for a different pain medicine. · If your doctor prescribed antibiotics, take them as directed. Do not stop taking them just because you feel better. You need to take the full course of antibiotics. Incision care  · If you have strips of tape on the incision, or cut, leave the tape on for a week or until it falls off. · After 24 to 48 hours, wash the area daily with warm, soapy water, and pat it dry. · You may have staples to hold the cut together. Keep them dry until your doctor takes them out. This is usually in 7 to 10 days. · Keep the area clean and dry. You may cover it with a gauze bandage if it weeps or rubs against clothing. Change the bandage every day. Ice  · To reduce swelling and pain, put ice or a cold pack on your belly for 10 to 20 minutes at a time. Do this every 1 to 2 hours. Put a thin cloth between the ice and your skin. Follow-up care is a key part of your treatment and safety. Be sure to make and go to all appointments, and call your doctor if you are having problems. It's also a good idea to know your test results and keep a list of the medicines you take. When should you call for help? Call 911 anytime you think you may need emergency care. For example, call if:  · You passed out (lost consciousness). · You have severe trouble breathing. · You have sudden chest pain and shortness of breath, or you cough up blood.   Call your doctor now or seek immediate medical care if:  · You are sick to your stomach and cannot drink fluids. · You have pain that does not get better when you take your pain medicine. · You have signs of infection, such as:  ¨ Increased pain, swelling, warmth, or redness. ¨ Red streaks leading from the incision. ¨ Pus draining from the incision. ¨ Swollen lymph nodes in your neck, armpits, or groin. ¨ A fever. · Your urine turns dark brown or your stool is light-colored or bobby-colored. · Your skin or the whites of your eyes turn yellow. · Bright red blood has soaked through a large bandage over your incision. · You have signs of a blood clot, such as:  ¨ Pain in your calf, back of knee, thigh, or groin. ¨ Redness and swelling in your leg or groin. · You have trouble passing urine or stool, especially if you have mild pain or swelling in your lower belly. Watch closely for any changes in your health, and be sure to contact your doctor if:  · You had a laparoscopic surgery and your shoulder pain lasts more than 24 hours. · You do not have a bowel movement after taking a laxative. Where can you learn more? Go to http://lisbet-navya.info/. Enter [phone] in the search box to learn more about \"Cholecystectomy: What to Expect at Home. \"  Current as of: August 9, 2016  Content Version: 11.3  © 8545-7661 [employer]. Care instructions adapted under license by [employer] (which disclaims liability or warranty for this information). If you have questions about a medical condition or this instruction, always ask your healthcare professional. [place] [address] any warranty or liability for your use of this information.         Natasha Lou MD, PhD, FACS  General Surgery at [facility], [address]   [facility]  [phone]  Fax [phone]

## 2017-08-14 ENCOUNTER — OFFICE VISIT (OUTPATIENT)
Dept: SURGERY | Age: 82
End: 2017-08-14

## 2017-08-14 VITALS
OXYGEN SATURATION: 96 % | SYSTOLIC BLOOD PRESSURE: 130 MMHG | BODY MASS INDEX: 32.66 KG/M2 | HEART RATE: 75 BPM | DIASTOLIC BLOOD PRESSURE: 56 MMHG | TEMPERATURE: 97.9 F | RESPIRATION RATE: 14 BRPM | WEIGHT: 173 LBS | HEIGHT: 61 IN

## 2017-08-14 DIAGNOSIS — Z09 POSTOPERATIVE EXAMINATION: Primary | ICD-10-CM

## 2017-08-14 NOTE — PROGRESS NOTES
1. Have you been to the ER, urgent care clinic since your last visit? Hospitalized since your last visit?no    2. Have you seen or consulted any other health care providers outside of the [address] [place] [address] since your last visit? Include any pap smears or colon screening.  no

## 2017-08-14 NOTE — PROGRESS NOTES
SUBJECTIVE: Aiyana Sadler is a 80 y.o. female is seen for a routine postop check. Reports no problems with the wound or other issues. Activity, diet and bowels are normal. No pain. OBJECTIVE: Appears well. Wound is well healed without complications or infection. ASSESSMENT: normal postoperative course, doing well. PLAN:  Return PRN.

## 2017-10-23 ENCOUNTER — OFFICE VISIT (OUTPATIENT)
Dept: INTERNAL MEDICINE CLINIC | Age: 82
End: 2017-10-23

## 2017-10-23 VITALS
TEMPERATURE: 98.8 F | RESPIRATION RATE: 18 BRPM | DIASTOLIC BLOOD PRESSURE: 80 MMHG | OXYGEN SATURATION: 96 % | HEIGHT: 61 IN | SYSTOLIC BLOOD PRESSURE: 160 MMHG | WEIGHT: 170.2 LBS | BODY MASS INDEX: 32.13 KG/M2 | HEART RATE: 56 BPM

## 2017-10-23 DIAGNOSIS — I10 ESSENTIAL HYPERTENSION: ICD-10-CM

## 2017-10-23 DIAGNOSIS — K21.9 GASTROESOPHAGEAL REFLUX DISEASE WITHOUT ESOPHAGITIS: Primary | ICD-10-CM

## 2017-10-23 DIAGNOSIS — F32.3 PSYCHOTIC DEPRESSION (HCC): ICD-10-CM

## 2017-10-23 DIAGNOSIS — Z23 ENCOUNTER FOR IMMUNIZATION: ICD-10-CM

## 2017-10-23 RX ORDER — OMEPRAZOLE 40 MG/1
40 CAPSULE, DELAYED RELEASE ORAL DAILY
Qty: 90 CAP | Refills: 1 | Status: SHIPPED | OUTPATIENT
Start: 2017-10-23 | End: 2018-04-11 | Stop reason: ALTCHOICE

## 2017-10-23 RX ORDER — SPIRONOLACTONE 50 MG/1
50 TABLET, FILM COATED ORAL DAILY
Qty: 90 TAB | Refills: 1 | Status: SHIPPED | OUTPATIENT
Start: 2017-10-23 | End: 2018-01-30

## 2017-10-23 RX ORDER — OMEPRAZOLE 20 MG/1
20 CAPSULE, DELAYED RELEASE ORAL DAILY
Qty: 90 CAP | Refills: 1 | Status: SHIPPED | OUTPATIENT
Start: 2017-10-23 | End: 2017-10-23 | Stop reason: SDUPTHER

## 2017-10-23 RX ORDER — QUETIAPINE FUMARATE 50 MG/1
50 TABLET, FILM COATED ORAL
Qty: 90 TAB | Refills: 1 | Status: SHIPPED | OUTPATIENT
Start: 2017-10-23 | End: 2018-01-30 | Stop reason: SDUPTHER

## 2017-10-23 NOTE — MR AVS SNAPSHOT
Visit Information Date & Time Provider Department Dept. Phone Encounter #  
 10/23/2017 10:15 AM Brad Trevizo MD [facility] [phone] [id number] Follow-up Instructions Return in about 3 months (around 1/23/2018). Your Appointments 10/25/2017 10:45 AM  
ROUTINE CARE with Brad Trevizo MD  
[facility] Appt Note: 6 MONTH F/U  
 [place] [address] [facility] [address]  
[facility] [address] [facility] [address] [address] Upcoming Health Maintenance Date Due  
 EYE EXAM RETINAL OR DILATED Q1 7/20/1941 DTaP/Tdap/Td series (1 - Tdap) 7/20/1952 GLAUCOMA SCREENING Q2Y 7/20/1996 OSTEOPOROSIS SCREENING (DEXA) 7/20/1996 MEDICARE YEARLY EXAM 7/20/1996 FOOT EXAM Q1 9/14/2016 LIPID PANEL Q1 3/11/2017 INFLUENZA AGE 9 TO ADULT 8/1/2017 HEMOGLOBIN A1C Q6M 10/24/2017 MICROALBUMIN Q1 10/27/2017 Allergies as of 10/23/2017  Review Complete On: 10/23/2017 By: Latha Siddiqui Severity Noted Reaction Type Reactions Meperidine Medium 03/11/2016    Hives Other reaction(s): Hives Ace Inhibitors  05/14/2010    Unknown (comments) Codeine  05/14/2010    Unknown (comments) Miralax [Polyethylene Glycol 3350]  05/14/2010    Nausea and Vomiting Other Plant, Animal, Environmental  07/14/2017    Rash Bilateral Hearing Aids cause severe reaction per pt report Percocet [Oxycodone-acetaminophen]  05/14/2010    Unknown (comments) Statins-hmg-coa Reductase Inhibitors  05/14/2010    Unknown (comments) Sular [Nisoldipine]  05/14/2010    Rash Zetia [Ezetimibe]  05/14/2010    Unknown (comments) Current Immunizations  Reviewed on 10/27/2016 Name Date Influenza High Dose Vaccine PF 10/27/2016 Influenza Vaccine 9/25/2013 Influenza Vaccine Split 9/19/2012, 11/17/2011, 11/10/2010 Pneumococcal Conjugate (PCV-13) 10/27/2016 ZZZ-RETIRED (DO NOT USE) Pneumococcal Vaccine (Unspecified Type) 5/14/2001 Not reviewed this visit You Were Diagnosed With   
  
 Codes Comments Gastroesophageal reflux disease without esophagitis    -  Primary ICD-10-CM: K21.9 ICD-9-CM: 530.81 Psychotic depression ([facility] [address]     ICD-10-CM: F32.3 ICD-9-CM: 296.20 Essential hypertension     ICD-10-CM: I10 
ICD-9-CM: 401.9 Encounter for immunization     ICD-10-CM: X28 ICD-9-CM: V03.89 Vitals BP Pulse Temp Resp Height(growth percentile) Weight(growth percentile) 160/80 (BP 1 Location: Left arm, BP Patient Position: At rest) (!) 56 98.8 °F (37.1 °C) (Oral) 18 5' 1\" (1.549 m) 170 lb 3.2 oz (77.2 kg) SpO2 BMI OB Status Smoking Status 96% 32.16 kg/m2 Hysterectomy Never Smoker Vitals History BMI and BSA Data Body Mass Index Body Surface Area  
 32.16 kg/m 2 1.82 m 2 Preferred Pharmacy Pharmacy Name Phone [id number] [address] [phone] Your Updated Medication List  
  
   
This list is accurate as of: 10/23/17 10:27 AM.  Always use your most recent med list.  
  
  
  
  
 bumetanide 1 mg tablet Commonly known as:  Navi Hy Take 0.5 mg by mouth daily. HYDROcodone-acetaminophen 5-325 mg per tablet Commonly known as:  Ernestine Fernando Take 1 Tab by mouth every four (4) hours as needed for Pain. Max Daily Amount: 6 Tabs. metoprolol succinate 100 mg tablet Commonly known as:  TOPROL-XL Take 100 mg by mouth daily. omeprazole 40 mg capsule Commonly known as:  PRILOSEC Take 1 Cap by mouth daily. OTHER Take 1 Cap by mouth daily. QUEtiapine 50 mg tablet Commonly known as:  SEROquel Take 1 Tab by mouth nightly. spironolactone 50 mg tablet Commonly known as:  ALDACTONE Take 1 Tab by mouth daily. XALATAN 0.005 % ophthalmic solution Generic drug:  latanoprost  
 Administer 1 Drop to both eyes nightly. Prescriptions Sent to Pharmacy Refills QUEtiapine (SEROQUEL) 50 mg tablet 1 Sig: Take 1 Tab by mouth nightly. Class: Normal  
 [facility]acy: [facility] Ph #: [phone] Route: Oral  
 omeprazole (PRILOSEC) 40 mg capsule 1 Sig: Take 1 Cap by mouth daily. Class: Normal  
 [facility]acy: [facility] Ph #: [phone] Route: Oral  
 spironolactone (ALDACTONE) 50 mg tablet 1 Sig: Take 1 Tab by mouth daily. Class: Normal  
 [facility]acy: [facility] Ph #: [phone] Route: Oral  
  
Follow-up Instructions Return in about 3 months (around 1/23/2018). Introducing [facility]! [employer] introduces [employer] patient portal. Now you can access parts of your medical record, email your doctor's office, and request medication refills online. 1. In your internet browser, go to [email or web address] 2. Click on the First Time User? Click Here link in the Sign In box. You will see the New Member Sign Up page. 3. Enter your [employer] Access Code exactly as it appears below. You will not need to use this code after youve completed the sign-up process. If you do not sign up before the expiration date, you must request a new code. · [employer] Access Code: [email or web address] Expires: 10/29/2017  5:41 AM 
 
4. Enter the last four digits of your Social Security Number (xxxx) and Date of Birth (mm/dd/yyyy) as indicated and click Submit. You will be taken to the next sign-up page. 5. Create a [employer] ID. This will be your [employer] login ID and cannot be changed, so think of one that is secure and easy to remember. 6. Create a [employer] password. You can change your password at any time. 7. Enter your Password Reset Question and Answer.  This can be used at a later time if you forget your password. 8. Enter your e-mail address. You will receive e-mail notification when new information is available in [address] 9. Click Sign Up. You can now view and download portions of your medical record. 10. Click the Download Summary menu link to download a portable copy of your medical information. If you have questions, please visit the Frequently Asked Questions section of the [employer] website. Remember, [employer] is NOT to be used for urgent needs. For medical emergencies, dial 911. Now available from your iPhone and Android! Please provide this summary of care documentation to your next provider. Your primary care clinician is listed as [id number] [address] [place] [address] [address]. If you have any questions after today's visit, please call [phone].

## 2017-10-23 NOTE — PROGRESS NOTES
HISTORY OF PRESENT ILLNESS  Seferino Saavedra is a 80 y.o. female. HPI  Here for GERD. She is on a PPI but still has some reflux. She had her GB removed without difficulty. Her HTN is not controlled at home. She is not sleeping as well despite her seroquel. Past Medical History:   Diagnosis Date    Anemia     Bradycardia     Depression     \"better since moving into my daughters mother-in-law suite\"    Diabetes ([address]     \"border\" per patient report    DJD (degenerative joint disease), lumbar     GERD (gastroesophageal reflux disease)     stricture.  Glaucoma     Glucose intolerance     [place] (hard of hearing)     HTN (hypertension)     Keratoconjunctivitis     sicca    Macular degeneration     macular degeneration vs ooptic neuropathy    ABAD (obstructive sleep apnea)     decided not to use d/t frequent bathroom trips at night. didn't help    Osteopenia     Otitis media     chronic    Stroke [facility]) 2011    Mini stroke \"in the past, affected my eyes\" Need glasses for reading    Venous insufficiency 10/10/2011     Current Outpatient Prescriptions   Medication Sig    QUEtiapine (SEROQUEL) 50 mg tablet Take 1 Tab by mouth nightly.  omeprazole (PRILOSEC) 40 mg capsule Take 1 Cap by mouth daily.  spironolactone (ALDACTONE) 50 mg tablet Take 1 Tab by mouth daily.  HYDROcodone-acetaminophen (NORCO) 5-325 mg per tablet Take 1 Tab by mouth every four (4) hours as needed for Pain. Max Daily Amount: 6 Tabs.  metoprolol succinate (TOPROL-XL) 100 mg tablet Take 100 mg by mouth daily.  OTHER Take 1 Cap by mouth daily.  latanoprost (XALATAN) 0.005 % ophthalmic solution Administer 1 Drop to both eyes nightly.  bumetanide (BUMEX) 1 mg tablet Take 0.5 mg by mouth daily. No current facility-administered medications for this visit. Review of Systems   All other systems reviewed and are negative.     Visit Vitals    /80 (BP 1 Location: Left arm, BP Patient Position: At rest)    Pulse (!) 56    Temp 98.8 °F (37.1 °C) (Oral)    Resp 18    Ht 5' 1\" (1.549 m)    Wt 170 lb 3.2 oz (77.2 kg)    SpO2 96%    BMI 32.16 kg/m2       Physical Exam   Constitutional: She appears well-developed and well-nourished. Cardiovascular: Normal rate, regular rhythm and normal heart sounds. Pulmonary/Chest: Effort normal and breath sounds normal. No respiratory distress. She has no wheezes. She has no rales. Nursing note and vitals reviewed. ASSESSMENT and PLAN  Diagnoses and all orders for this visit:    1. Gastroesophageal reflux disease without esophagitis  -    increase omeprazole (PRILOSEC) 40 mg capsule; Take 1 Cap by mouth daily. 2. Psychotic depression (HCC)  -    Increase  QUEtiapine (SEROQUEL) 50 mg tablet; Take 1 Tab by mouth nightly. 3. Essential hypertension  -   increase  spironolactone (ALDACTONE) 50 mg tablet; Take 1 Tab by mouth daily.     4. Encounter for immunization    Reviewed plan of care with the patient who has provided input and agrees with the goals

## 2017-10-23 NOTE — PROGRESS NOTES
1. Have you been to the ER, urgent care clinic since your last visit? Hospitalized since your last visit?no      2. Have you seen or consulted any other health care providers outside of the [address] [place] [address] since your last visit? Include any pap smears or colon screening.  No    Chief Complaint   Patient presents with    Follow-up     surgery     Fasting

## 2017-12-27 ENCOUNTER — OFFICE VISIT (OUTPATIENT)
Dept: INTERNAL MEDICINE CLINIC | Age: 82
End: 2017-12-27

## 2017-12-27 VITALS
WEIGHT: 170 LBS | OXYGEN SATURATION: 97 % | RESPIRATION RATE: 18 BRPM | TEMPERATURE: 98.2 F | HEART RATE: 51 BPM | HEIGHT: 61 IN | BODY MASS INDEX: 32.1 KG/M2 | DIASTOLIC BLOOD PRESSURE: 82 MMHG | SYSTOLIC BLOOD PRESSURE: 140 MMHG

## 2017-12-27 DIAGNOSIS — I10 ESSENTIAL HYPERTENSION: ICD-10-CM

## 2017-12-27 DIAGNOSIS — G47.33 OSA (OBSTRUCTIVE SLEEP APNEA): ICD-10-CM

## 2017-12-27 DIAGNOSIS — K21.9 GASTROESOPHAGEAL REFLUX DISEASE WITHOUT ESOPHAGITIS: Primary | ICD-10-CM

## 2017-12-27 PROBLEM — F51.04 PSYCHOPHYSIOLOGICAL INSOMNIA: Status: ACTIVE | Noted: 2017-12-27

## 2017-12-27 RX ORDER — CLOBETASOL PROPIONATE 0.5 MG/G
AEROSOL, FOAM TOPICAL
Refills: 2 | Status: ON HOLD | COMMUNITY
Start: 2017-12-14 | End: 2018-08-29

## 2017-12-27 NOTE — PROGRESS NOTES
Saad Mendez ................. Chief Complaint   Patient presents with    Hypertension     2 months follow up    Diabetes     2 months follow up   1. Have you been to the ER, urgent care clinic since your last visit? Hospitalized since your last visit? yes      2. Have you seen or consulted any other health care providers outside of the [address] [place] [address] since your last visit? Include any pap smears or colon screening.  Yes    Fasting

## 2017-12-27 NOTE — PROGRESS NOTES
HISTORY OF PRESENT ILLNESS  Christy Almeida is a 80 y.o. female. HPI  Here for HTN. We doubled her med last visit and this worked well. She is due for labs. Her GERD resolved since we doubled her PPI. She is not sleeping well and has frequent arousals. She has untreated ABAD. Past Medical History:   Diagnosis Date    Anemia     Bradycardia     Depression     \"better since moving into my daughters mother-in-law suite\"    Diabetes ([address]     \"border\" per patient report    DJD (degenerative joint disease), lumbar     GERD (gastroesophageal reflux disease)     stricture.  Glaucoma     Glucose intolerance     [place] (hard of hearing)     HTN (hypertension)     Keratoconjunctivitis     sicca    Macular degeneration     macular degeneration vs ooptic neuropathy    ABAD (obstructive sleep apnea)     decided not to use d/t frequent bathroom trips at night. didn't help    Osteopenia     Otitis media     chronic    Stroke [facility]) 2011    Mini stroke \"in the past, affected my eyes\" Need glasses for reading    Venous insufficiency 10/10/2011     Current Outpatient Prescriptions   Medication Sig    clobetasol (OLUX) 0.05 % topical foam APPLY 1 APPLICATION TO AFFECTED AREA TWICE A DAY EXTERNALLY TO EARS 7 DAYS, AS NEEDED FOR FLARES    QUEtiapine (SEROQUEL) 50 mg tablet Take 1 Tab by mouth nightly.  omeprazole (PRILOSEC) 40 mg capsule Take 1 Cap by mouth daily.  spironolactone (ALDACTONE) 50 mg tablet Take 1 Tab by mouth daily.  HYDROcodone-acetaminophen (NORCO) 5-325 mg per tablet Take 1 Tab by mouth every four (4) hours as needed for Pain. Max Daily Amount: 6 Tabs.  metoprolol succinate (TOPROL-XL) 100 mg tablet Take 100 mg by mouth daily.  bumetanide (BUMEX) 1 mg tablet Take 0.5 mg by mouth daily.  latanoprost (XALATAN) 0.005 % ophthalmic solution Administer 1 Drop to both eyes nightly. No current facility-administered medications for this visit.         Review of Systems   All other systems reviewed and are negative. Visit Vitals    /82 (BP 1 Location: Left arm, BP Patient Position: At rest)    Pulse (!) 51    Temp 98.2 °F (36.8 °C) (Oral)    Resp 18    Ht 5' 1\" (1.549 m)    Wt 170 lb (77.1 kg)    SpO2 97%    BMI 32.12 kg/m2       Physical Exam   Constitutional: She appears well-developed and well-nourished. Cardiovascular: Normal rate, regular rhythm and normal heart sounds. Pulmonary/Chest: Effort normal and breath sounds normal. No respiratory distress. She has no wheezes. She has no rales. Nursing note and vitals reviewed. ASSESSMENT and PLAN  Diagnoses and all orders for this visit:    1. Gastroesophageal reflux disease without esophagitis  -     CBC WITH AUTOMATED DIFF  The current medical regimen is effective;  continue present plan and medications. 2. Essential hypertension  -     METABOLIC PANEL, COMPREHENSIVE  The current medical regimen is effective;  continue present plan and medications. 3. ABAD (obstructive sleep apnea)  Declines referral to sleep medicine.       Reviewed plan of care with the patient who has provided input and agrees with the goals

## 2017-12-27 NOTE — MR AVS SNAPSHOT
Visit Information Date & Time Provider Department Dept. Phone Encounter #  
 12/27/2017 10:00 AM Alicia Colon MD [facility] [phone] [id number] Follow-up Instructions Return in about 6 months (around 6/27/2018). Upcoming Health Maintenance Date Due  
 EYE EXAM RETINAL OR DILATED Q1 7/20/1941 DTaP/Tdap/Td series (1 - Tdap) 7/20/1952 GLAUCOMA SCREENING Q2Y 7/20/1996 OSTEOPOROSIS SCREENING (DEXA) 7/20/1996 MEDICARE YEARLY EXAM 7/20/1996 FOOT EXAM Q1 9/14/2016 LIPID PANEL Q1 3/11/2017 HEMOGLOBIN A1C Q6M 10/24/2017 MICROALBUMIN Q1 10/27/2017 Allergies as of 12/27/2017  Review Complete On: 12/27/2017 By: Rickie Avelar Severity Noted Reaction Type Reactions Meperidine Medium 03/11/2016    Hives Other reaction(s): Hives Ace Inhibitors  05/14/2010    Unknown (comments) Codeine  05/14/2010    Unknown (comments) Miralax [Polyethylene Glycol 3350]  05/14/2010    Nausea and Vomiting Other Plant, Animal, Environmental  07/14/2017    Rash Bilateral Hearing Aids cause severe reaction per pt report Percocet [Oxycodone-acetaminophen]  05/14/2010    Unknown (comments) Statins-hmg-coa Reductase Inhibitors  05/14/2010    Unknown (comments) Sular [Nisoldipine]  05/14/2010    Rash Zetia [Ezetimibe]  05/14/2010    Unknown (comments) Current Immunizations  Reviewed on 10/23/2017 Name Date Influenza High Dose Vaccine PF 10/23/2017, 10/27/2016 Influenza Vaccine 9/25/2013 Influenza Vaccine Split 9/19/2012, 11/17/2011, 11/10/2010 Pneumococcal Conjugate (PCV-13) 10/27/2016 ZZZ-RETIRED (DO NOT USE) Pneumococcal Vaccine (Unspecified Type) 5/14/2001 Not reviewed this visit You Were Diagnosed With   
  
 Codes Comments Gastroesophageal reflux disease without esophagitis    -  Primary ICD-10-CM: K21.9 ICD-9-CM: 530.81 Essential hypertension     ICD-10-CM: I10 
ICD-9-CM: 401.9 ABAD (obstructive sleep apnea)     ICD-10-CM: G47.33 
ICD-9-CM: 327.23 Vitals BP Pulse Temp Resp Height(growth percentile) Weight(growth percentile) 140/82 (BP 1 Location: Left arm, BP Patient Position: At rest) (!) 51 98.2 °F (36.8 °C) (Oral) 18 5' 1\" (1.549 m) 170 lb (77.1 kg) SpO2 BMI OB Status Smoking Status 97% 32.12 kg/m2 Hysterectomy Never Smoker Vitals History BMI and BSA Data Body Mass Index Body Surface Area  
 32.12 kg/m 2 1.82 m 2 Preferred Pharmacy Pharmacy Name Phone [id number], [address] [place] [address] [phone] Your Updated Medication List  
  
   
This list is accurate as of: 12/27/17 10:20 AM.  Always use your most recent med list.  
  
  
  
  
 bumetanide 1 mg tablet Commonly known as:  Assunta Brink Take 0.5 mg by mouth daily. clobetasol 0.05 % topical foam  
Commonly known as:  OLUX  
APPLY 1 APPLICATION TO AFFECTED AREA TWICE A DAY EXTERNALLY TO EARS 7 DAYS, AS NEEDED FOR FLARES HYDROcodone-acetaminophen 5-325 mg per tablet Commonly known as:  Bal Punt Take 1 Tab by mouth every four (4) hours as needed for Pain. Max Daily Amount: 6 Tabs. metoprolol succinate 100 mg tablet Commonly known as:  TOPROL-XL Take 100 mg by mouth daily. omeprazole 40 mg capsule Commonly known as:  PRILOSEC Take 1 Cap by mouth daily. QUEtiapine 50 mg tablet Commonly known as:  SEROquel Take 1 Tab by mouth nightly. spironolactone 50 mg tablet Commonly known as:  ALDACTONE Take 1 Tab by mouth daily. XALATAN 0.005 % ophthalmic solution Generic drug:  latanoprost  
Administer 1 Drop to both eyes nightly. We Performed the Following CBC WITH AUTOMATED DIFF [68072 CPT(R)] METABOLIC PANEL, COMPREHENSIVE [05113 CPT(R)] Follow-up Instructions Return in about 6 months (around 6/27/2018). [facility] SERVICES! Janice Zhen introduces [employer] patient portal. Now you can access parts of your medical record, email your doctor's office, and request medication refills online. 1. In your internet browser, go to [email or web address] 2. Click on the First Time User? Click Here link in the Sign In box. You will see the New Member Sign Up page. 3. Enter your BBOXX Access Code exactly as it appears below. You will not need to use this code after youve completed the sign-up process. If you do not sign up before the expiration date, you must request a new code. · BBOXX Access Code: [facility] Expires: 3/27/2018 10:20 AM 
 
4. Enter the last four digits of your Social Security Number (xxxx) and Date of Birth (mm/dd/yyyy) as indicated and click Submit. You will be taken to the next sign-up page. 5. Create a BBOXX ID. This will be your BBOXX login ID and cannot be changed, so think of one that is secure and easy to remember. 6. Create a BBOXX password. You can change your password at any time. 7. Enter your Password Reset Question and Answer. This can be used at a later time if you forget your password. 8. Enter your e-mail address. You will receive e-mail notification when new information is available in [address] 9. Click Sign Up. You can now view and download portions of your medical record. 10. Click the Download Summary menu link to download a portable copy of your medical information. If you have questions, please visit the Frequently Asked Questions section of the BBO[employer] website. Remember, B[employer]XX is NOT to be used for urgent needs. For medical emergencies, dial 911. Now available from your iPhone and Android! Please provide this summary of care documentation to your next provider. Your primary care clinician is listed as [id number] [address] [place] [address] [address]. If you have any questions after today's visit, please call [phone].

## 2017-12-29 ENCOUNTER — HOSPITAL ENCOUNTER (OUTPATIENT)
Dept: LAB | Age: 82
Discharge: HOME | End: 2017-12-29
Payer: MEDICARE

## 2017-12-29 PROCEDURE — 80053 COMPREHEN METABOLIC PANEL: CPT

## 2017-12-29 PROCEDURE — 85025 COMPLETE CBC W/AUTO DIFF WBC: CPT

## 2017-12-29 PROCEDURE — 36415 COLL VENOUS BLD VENIPUNCTURE: CPT

## 2017-12-30 LAB
ALBUMIN SERPL-MCNC: 4.3 G/DL (ref 3.5–4.7)
ALBUMIN/GLOB SERPL: 1.7 {RATIO} (ref 1.2–2.2)
ALP SERPL-CCNC: 81 IU/L (ref 39–117)
ALT SERPL-CCNC: 6 IU/L (ref 0–32)
AST SERPL-CCNC: 19 IU/L (ref 0–40)
BASOPHILS # BLD AUTO: 0 X10E3/UL (ref 0–0.2)
BASOPHILS NFR BLD AUTO: 0 %
BILIRUB SERPL-MCNC: 1 MG/DL (ref 0–1.2)
BUN SERPL-MCNC: 16 MG/DL (ref 8–27)
BUN/CREAT SERPL: 18 (ref 12–28)
CALCIUM SERPL-MCNC: 9.9 MG/DL (ref 8.7–10.3)
CHLORIDE SERPL-SCNC: 110 MMOL/L (ref 96–106)
CO2 SERPL-SCNC: 17 MMOL/L (ref 18–29)
CREAT SERPL-MCNC: 0.91 MG/DL (ref 0.57–1)
EOSINOPHIL # BLD AUTO: 0.3 X10E3/UL (ref 0–0.4)
EOSINOPHIL NFR BLD AUTO: 4 %
ERYTHROCYTE [DISTWIDTH] IN BLOOD BY AUTOMATED COUNT: 13.8 % (ref 12.3–15.4)
GLOBULIN SER CALC-MCNC: 2.5 G/DL (ref 1.5–4.5)
GLUCOSE SERPL-MCNC: 79 MG/DL (ref 65–99)
HCT VFR BLD AUTO: 47.6 % (ref 34–46.6)
HGB BLD-MCNC: 15.9 G/DL (ref 11.1–15.9)
IMM GRANULOCYTES # BLD: 0 X10E3/UL (ref 0–0.1)
IMM GRANULOCYTES NFR BLD: 0 %
INTERPRETATION: NORMAL
LYMPHOCYTES # BLD AUTO: 1.4 X10E3/UL (ref 0.7–3.1)
LYMPHOCYTES NFR BLD AUTO: 22 %
MCH RBC QN AUTO: 29.9 PG (ref 26.6–33)
MCHC RBC AUTO-ENTMCNC: 33.4 G/DL (ref 31.5–35.7)
MCV RBC AUTO: 90 FL (ref 79–97)
MONOCYTES # BLD AUTO: 0.8 X10E3/UL (ref 0.1–0.9)
MONOCYTES NFR BLD AUTO: 13 %
NEUTROPHILS # BLD AUTO: 3.8 X10E3/UL (ref 1.4–7)
NEUTROPHILS NFR BLD AUTO: 61 %
PLATELET # BLD AUTO: 162 X10E3/UL (ref 150–379)
POTASSIUM SERPL-SCNC: 5.7 MMOL/L (ref 3.5–5.2)
PROT SERPL-MCNC: 6.8 G/DL (ref 6–8.5)
RBC # BLD AUTO: 5.31 X10E6/UL (ref 3.77–5.28)
SODIUM SERPL-SCNC: 148 MMOL/L (ref 134–144)
WBC # BLD AUTO: 6.4 X10E3/UL (ref 3.4–10.8)

## 2018-01-08 ENCOUNTER — TELEPHONE (OUTPATIENT)
Dept: INTERNAL MEDICINE CLINIC | Age: 83
End: 2018-01-08

## 2018-01-08 NOTE — TELEPHONE ENCOUNTER
Spoke with patient advised per Dr Siri Crane that potassium is too high so stop spironolactone and see me in two weeks to retest. Patient verbalized understanding

## 2018-01-30 ENCOUNTER — OFFICE VISIT (OUTPATIENT)
Dept: INTERNAL MEDICINE CLINIC | Age: 83
End: 2018-01-30

## 2018-01-30 VITALS
DIASTOLIC BLOOD PRESSURE: 70 MMHG | HEART RATE: 77 BPM | BODY MASS INDEX: 32.12 KG/M2 | RESPIRATION RATE: 18 BRPM | OXYGEN SATURATION: 97 % | WEIGHT: 170.1 LBS | HEIGHT: 61 IN | SYSTOLIC BLOOD PRESSURE: 165 MMHG | TEMPERATURE: 98 F

## 2018-01-30 DIAGNOSIS — E11.9 DIABETES MELLITUS WITHOUT COMPLICATION (HCC): ICD-10-CM

## 2018-01-30 DIAGNOSIS — E87.5 HYPERKALEMIA: ICD-10-CM

## 2018-01-30 DIAGNOSIS — G47.33 OSA (OBSTRUCTIVE SLEEP APNEA): ICD-10-CM

## 2018-01-30 DIAGNOSIS — F32.3 PSYCHOTIC DEPRESSION (HCC): ICD-10-CM

## 2018-01-30 DIAGNOSIS — I10 ESSENTIAL HYPERTENSION: Primary | ICD-10-CM

## 2018-01-30 PROBLEM — E11.319 TYPE 2 DIABETES MELLITUS WITH DIABETIC RETINOPATHY (HCC): Status: ACTIVE | Noted: 2018-01-30

## 2018-01-30 PROBLEM — E11.21 TYPE 2 DIABETES MELLITUS WITH NEPHROPATHY (HCC): Status: ACTIVE | Noted: 2018-01-30

## 2018-01-30 RX ORDER — DILTIAZEM HYDROCHLORIDE 180 MG/1
180 CAPSULE, COATED, EXTENDED RELEASE ORAL DAILY
Qty: 90 CAP | Refills: 1 | Status: SHIPPED | OUTPATIENT
Start: 2018-01-30 | End: 2018-04-25 | Stop reason: ALTCHOICE

## 2018-01-30 RX ORDER — QUETIAPINE FUMARATE 50 MG/1
50 TABLET, FILM COATED ORAL
Qty: 90 TAB | Refills: 1 | Status: SHIPPED | OUTPATIENT
Start: 2018-01-30 | End: 2018-04-25 | Stop reason: SINTOL

## 2018-01-30 RX ORDER — BUMETANIDE 0.5 MG/1
0.5 TABLET ORAL DAILY
Qty: 90 TAB | Refills: 1 | Status: ON HOLD | OUTPATIENT
Start: 2018-01-30 | End: 2018-08-29

## 2018-01-30 NOTE — PROGRESS NOTES
HISTORY OF PRESENT ILLNESS  Bindu Wright is a 80 y.o. female. HPI  Here for HTN. We stopped her spironolactone due to hyperkalemia. She had a prolonged blood draw, however. She needs repeat labs and to have her diet controlled DM assessed. She continues to feel tired during the day. She has untreated ABAD. Her depression is doing well. Past Medical History:   Diagnosis Date    Anemia     Bradycardia     Depression     \"better since moving into my daughters mother-in-law suite\"    Diabetes ([address]     \"border\" per patient report    DJD (degenerative joint disease), lumbar     GERD (gastroesophageal reflux disease)     stricture.  Glaucoma     Glucose intolerance     [place] (hard of hearing)     HTN (hypertension)     Keratoconjunctivitis     sicca    Macular degeneration     macular degeneration vs ooptic neuropathy    ABAD (obstructive sleep apnea)     decided not to use d/t frequent bathroom trips at night. didn't help    Osteopenia     Otitis media     chronic    Stroke [facility]) 2011    Mini stroke \"in the past, affected my eyes\" Need glasses for reading    Venous insufficiency 10/10/2011     Current Outpatient Prescriptions   Medication Sig    QUEtiapine (SEROQUEL) 50 mg tablet Take 1 Tab by mouth nightly.  bumetanide (BUMEX) 0.5 mg tablet Take 1 Tab by mouth daily.  dilTIAZem CD (CARDIZEM CD) 180 mg ER capsule Take 1 Cap by mouth daily.  clobetasol (OLUX) 0.05 % topical foam APPLY 1 APPLICATION TO AFFECTED AREA TWICE A DAY EXTERNALLY TO EARS 7 DAYS, AS NEEDED FOR FLARES    omeprazole (PRILOSEC) 40 mg capsule Take 1 Cap by mouth daily.  metoprolol succinate (TOPROL-XL) 100 mg tablet Take 100 mg by mouth daily.  latanoprost (XALATAN) 0.005 % ophthalmic solution Administer 1 Drop to both eyes nightly. No current facility-administered medications for this visit. Review of Systems   All other systems reviewed and are negative.     Visit Vitals    /70 (BP 1 Location: Left arm, BP Patient Position: At rest)    Pulse 77    Temp 98 °F (36.7 °C) (Oral)    Resp 18    Ht 5' 1\" (1.549 m)    Wt 170 lb 1.6 oz (77.2 kg)    SpO2 97%    BMI 32.14 kg/m2       Physical Exam   Constitutional: She appears well-developed and well-nourished. Musculoskeletal: She exhibits no edema. Psychiatric: She has a normal mood and affect. Her behavior is normal. Judgment and thought content normal.   Nursing note and vitals reviewed. Lab Results   Component Value Date/Time    Hemoglobin A1c 6.0 04/24/2017 11:13 AM       ASSESSMENT and PLAN  Diagnoses and all orders for this visit:    1. Essential hypertension  -     bumetanide (BUMEX) 0.5 mg tablet; Take 1 Tab by mouth daily. -    add dilTIAZem CD (CARDIZEM CD) 180 mg ER capsule; Take 1 Cap by mouth daily. 2. Hyperkalemia  Likely due to prolonged tourniquet time. 3. Diabetes mellitus without complication (HCC)  -     METABOLIC PANEL, COMPREHENSIVE  -     MICROALBUMIN, UR, RAND W/ MICROALBUMIN/CREA RATIO  -     HEMOGLOBIN A1C WITH EAG  The current medical regimen is effective;  continue present plan and medications. 4. Psychotic depression (HCC)  -     QUEtiapine (SEROQUEL) 50 mg tablet; Take 1 Tab by mouth nightly. The current medical regimen is effective;  continue present plan and medications. 5. ABAD (obstructive sleep apnea)  Declines Rx.     Reviewed plan of care with the patient who has provided input and agrees with the goals

## 2018-01-30 NOTE — PROGRESS NOTES
1. Have you been to the ER, urgent care clinic since your last visit? Hospitalized since your last visit?no    2. Have you seen or consulted any other health care providers outside of the [address] [place] [address] since your last visit? Include any pap smears or colon screening.  Yes  Chief Complaint   Patient presents with    Diabetes     2 months follow up    Hypertension     2 months follow up     Not fasting

## 2018-01-30 NOTE — LETTER
2/19/2018 1:46 PM 
 
Ms. Nohemy Kim [phone] Dear Nohemy Kim: 
 
Please find your most recent results below. Hemoglobin a1c looks great 5.4   
    
   
 
 
 
 
Resulted Orders METABOLIC PANEL, COMPREHENSIVE Result Value Ref Range Glucose CANCELED mg/dL Comment:  
   [facility] was unable to collect sufficient specimen to perform the 
following test(s), and is providing the patient with re-collection 
instructions. Result canceled by the ancillary Narrative Performed at:  [address]  [id number] : Brianna Phelps MD, Phone:  [phone] MICROALBUMIN, UR, RAND W/ MICROALBUMIN/CREA RATIO Result Value Ref Range Creatinine, urine 177.1 Not Estab. mg/dL Microalbumin, urine 14.8 Not Estab. ug/mL Microalb/Creat ratio (ug/mg creat.) 8.4 0.0 - 30.0 mg/g creat Narrative Performed at:  [address]  [id number] : Brianna Phelps MD, Phone:  [phone] HEMOGLOBIN A1C WITH EAG Result Value Ref Range Hemoglobin A1c 5.4 4.8 - 5.6 % Comment:  
            Pre-diabetes: 5.7 - 6.4 Diabetes: >6.4 Glycemic control for adults with diabetes: <7.0 Estimated average glucose 108 mg/dL Narrative Performed at:  [address]  [id number] : Brianna Phelps MD, Phone:  [phone] Please call me if you have any questions: [phone] Sincerely, 
 
 
Carla Acosta PA-C

## 2018-02-08 ENCOUNTER — HOSPITAL ENCOUNTER (OUTPATIENT)
Dept: LAB | Age: 83
Discharge: HOME | End: 2018-02-08
Payer: MEDICARE

## 2018-02-08 PROCEDURE — 80053 COMPREHEN METABOLIC PANEL: CPT

## 2018-02-08 PROCEDURE — 36415 COLL VENOUS BLD VENIPUNCTURE: CPT

## 2018-02-08 PROCEDURE — 82043 UR ALBUMIN QUANTITATIVE: CPT

## 2018-02-08 PROCEDURE — 83036 HEMOGLOBIN GLYCOSYLATED A1C: CPT

## 2018-02-16 LAB
ALBUMIN/CREAT UR: 8.4 MG/G CREAT (ref 0–30)
CREAT UR-MCNC: 177.1 MG/DL
EST. AVERAGE GLUCOSE BLD GHB EST-MCNC: 108 MG/DL
GLUCOSE SERPL-MCNC: NORMAL MG/DL
HBA1C MFR BLD: 5.4 % (ref 4.8–5.6)
MICROALBUMIN UR-MCNC: 14.8 UG/ML

## 2018-02-19 NOTE — PROGRESS NOTES
Writer contacted patient to inform of lab results per Chacha Frye, patient verbalized understanding and copy in the mail per patient request.

## 2018-04-11 ENCOUNTER — OFFICE VISIT (OUTPATIENT)
Dept: INTERNAL MEDICINE CLINIC | Age: 83
End: 2018-04-11

## 2018-04-11 VITALS
RESPIRATION RATE: 18 BRPM | TEMPERATURE: 98.8 F | WEIGHT: 171 LBS | OXYGEN SATURATION: 96 % | HEART RATE: 60 BPM | DIASTOLIC BLOOD PRESSURE: 82 MMHG | BODY MASS INDEX: 32.28 KG/M2 | HEIGHT: 61 IN | SYSTOLIC BLOOD PRESSURE: 181 MMHG

## 2018-04-11 DIAGNOSIS — I10 ACCELERATED HYPERTENSION: Primary | ICD-10-CM

## 2018-04-11 DIAGNOSIS — R29.6 FALLS FREQUENTLY: ICD-10-CM

## 2018-04-11 PROBLEM — E11.3599 TYPE 2 DIABETES MELLITUS WITH PROLIFERATIVE RETINOPATHY (HCC): Status: ACTIVE | Noted: 2018-04-11

## 2018-04-11 RX ORDER — OMEPRAZOLE 20 MG/1
CAPSULE, DELAYED RELEASE ORAL
Refills: 1 | COMMUNITY
Start: 2018-03-07 | End: 2018-04-11

## 2018-04-11 RX ORDER — SPIRONOLACTONE 25 MG/1
25 TABLET ORAL DAILY
Qty: 1 TAB | Refills: 0
Start: 2018-04-11 | End: 2018-05-30 | Stop reason: DRUGHIGH

## 2018-04-11 NOTE — MR AVS SNAPSHOT
[facility] 
 
 
 [address] [place] [address] [facility] 
[phone] Patient: Júnior Crews MRN:  [id number] Visit Information Date & Time Provider Department Dept. Phone Encounter #  
 4/11/2018 10:00 AM Jesus Garcia MD [facility] [address] [place]  [id number] Follow-up Instructions Return in about 2 weeks (around 4/25/2018). Upcoming Health Maintenance Date Due  
 EYE EXAM RETINAL OR DILATED Q1 7/20/1941 DTaP/Tdap/Td series (1 - Tdap) 7/20/1952 GLAUCOMA SCREENING Q2Y 7/20/1996 Bone Densitometry (Dexa) Screening 7/20/1996 FOOT EXAM Q1 9/14/2016 LIPID PANEL Q1 3/11/2017 MEDICARE YEARLY EXAM 3/14/2018 HEMOGLOBIN A1C Q6M 8/8/2018 MICROALBUMIN Q1 2/8/2019 Allergies as of 4/11/2018  Review Complete On: 4/11/2018 By: Jesus Garcia MD  
  
 Severity Noted Reaction Type Reactions Meperidine Medium 03/11/2016    Hives Other reaction(s): Hives Ace Inhibitors  05/14/2010    Unknown (comments) Aldactone [Spironolactone]  01/30/2018    Other (comments)  
 hyperkalemia Codeine  05/14/2010    Unknown (comments) Miralax [Polyethylene Glycol 3350]  05/14/2010    Nausea and Vomiting Other Plant, Animal, Environmental  07/14/2017    Rash Bilateral Hearing Aids cause severe reaction per pt report Percocet [Oxycodone-acetaminophen]  05/14/2010    Unknown (comments) Statins-hmg-coa Reductase Inhibitors  05/14/2010    Unknown (comments) Sular [Nisoldipine]  05/14/2010    Rash Zetia [Ezetimibe]  05/14/2010    Unknown (comments) Current Immunizations  Reviewed on 10/23/2017 Name Date Influenza High Dose Vaccine PF 10/23/2017, 10/27/2016 Influenza Vaccine 9/25/2013 Influenza Vaccine Split 9/19/2012, 11/17/2011, 11/10/2010 Pneumococcal Conjugate (PCV-13) 10/27/2016 ZZZ-RETIRED (DO NOT USE) Pneumococcal Vaccine (Unspecified Type) 5/14/2001 Not reviewed this visit You Were Diagnosed With   
  
 Codes Comments Accelerated hypertension    -  Primary ICD-10-CM: I10 
ICD-9-CM: 401.0 Falls frequently     ICD-10-CM: R29.6 ICD-9-CM: V15.88 Vitals BP Pulse Temp Resp Height(growth percentile) Weight(growth percentile) 181/82 (BP 1 Location: Right arm, BP Patient Position: Sitting) 60 98.8 °F (37.1 °C) (Oral) 18 5' 1\" (1.549 m) 171 lb (77.6 kg) SpO2 BMI OB Status Smoking Status 96% 32.31 kg/m2 Hysterectomy Never Smoker Vitals History BMI and BSA Data Body Mass Index Body Surface Area  
 32.31 kg/m 2 1.83 m 2 Preferred Pharmacy Pharmacy Name Phone [id number] Terrence Moseley, [address] [place] [address] [phone] Your Updated Medication List  
  
   
This list is accurate as of 4/11/18 10:48 AM.  Always use your most recent med list.  
  
  
  
  
 bumetanide 0.5 mg tablet Commonly known as:  Shellia Goad Take 1 Tab by mouth daily. clobetasol 0.05 % topical foam  
Commonly known as:  OLUX  
APPLY 1 APPLICATION TO AFFECTED AREA TWICE A DAY EXTERNALLY TO EARS 7 DAYS, AS NEEDED FOR FLARES  
  
 dilTIAZem  mg ER capsule Commonly known as:  CARDIZEM CD Take 1 Cap by mouth daily. QUEtiapine 50 mg tablet Commonly known as:  SEROquel Take 1 Tab by mouth nightly. REFRESH OP Apply  to eye daily. spironolactone 25 mg tablet Commonly known as:  ALDACTONE Take 1 Tab by mouth daily. XALATAN 0.005 % ophthalmic solution Generic drug:  latanoprost  
Administer 1 Drop to both eyes nightly. We Performed the Following REFERRAL TO PHYSICAL THERAPY [SGO01 Custom] Follow-up Instructions Return in about 2 weeks (around 4/25/2018). Referral Information Referral ID Referred By Referred To  
  
 [id number] [address], [address] [place] [address] [address], [place] Phone: [phone] Visits Status Start Date End Date 1 New Request 4/11/18 4/11/19 If your referral has a status of pending review or denied, additional information will be sent to support the outcome of this decision. Introducing [facility]! Nevaeh Mcdaniel introduces [employer] patient portal. Now you can access parts of your medical record, email your doctor's office, and request medication refills online. 1. In your internet browser, go to [email or web address] 2. Click on the First Time User? Click Here link in the Sign In box. You will see the New Member Sign Up page. 3. Enter your [employer]e Access Code exactly as it appears below. You will not need to use this code after youve completed the sign-up process. If you do not sign up before the expiration date, you must request a new code. · [employer]e Access Code: [email or web address] Expires: 7/10/2018  9:42 AM 
 
4. Enter the last four digits of your Social Security Number (xxxx) and Date of Birth (mm/dd/yyyy) as indicated and click Submit. You will be taken to the next sign-up page. 5. Create a Selphee ID. This will be your [employer]e login ID and cannot be changed, so think of one that is secure and easy to remember. 6. Create a [employer]e password. You can change your password at any time. 7. Enter your Password Reset Question and Answer. This can be used at a later time if you forget your password. 8. Enter your e-mail address. You will receive e-mail notification when new information is available in [address] 9. Click Sign Up. You can now view and download portions of your medical record. 10. Click the Download Summary menu link to download a portable copy of your medical information. If you have questions, please visit the Frequently Asked Questions section of the [employer]e website. Remember, [employer]e is NOT to be used for urgent needs. For medical emergencies, dial 911. Now available from your iPhone and Android! Please provide this summary of care documentation to your next provider. Your primary care clinician is listed as Bran Burrell. If you have any questions after today's visit, please call [phone].

## 2018-04-11 NOTE — PROGRESS NOTES
HISTORY OF PRESENT ILLNESS  Diya Davis is a 80 y.o. female. HPI  new to my practice  Transferring care from Dr. Barbara Weiss. Last evaluated there 3  months ago. Previous medical records are available for my review at this visit. Hypertension:  Diya Davis is a 80 y.o. female with hypertension. with Chronic kidney disease stage 3   Medication change since last visit: Yes: Comment: stopped spironolactone and metoprolol 3 months ago due to mildly elevated K. She reports BP has been elevated since  The patient reports:  taking medications as instructed, no medication side effects noted, home BP monitoring in range of 842'U systolic over 92'W diastolic, no chest pain on exertion, does have chronic dyspnea on exertion, no swelling of ankles, no orthostatic dizziness or lightheadedness, no palpitations. Lifestyle modification/social history: generally follows a low fat low cholesterol diet, generally follows a low sodium diet, sedentary, nonsmoker    Lab Results   Component Value Date/Time    Sodium 148 (H) 12/29/2017 11:03 AM    Potassium 5.7 (H) 12/29/2017 11:03 AM    Chloride 110 (H) 12/29/2017 11:03 AM    CO2 17 (L) 12/29/2017 11:03 AM    Anion gap 2 (L) 08/01/2017 12:10 AM    Glucose CANCELED 02/08/2018 09:29 AM    BUN 16 12/29/2017 11:03 AM    Creatinine 0.91 12/29/2017 11:03 AM    BUN/Creatinine ratio 18 12/29/2017 11:03 AM    GFR est AA 66 12/29/2017 11:03 AM    GFR est non-AA 57 (L) 12/29/2017 11:03 AM    Calcium 9.9 12/29/2017 11:03 AM     She reports several falls since Feb.  No dizziness or palpitations. She felt unstable and fell forward in driveway one time. .  No major injuries but bruised knees and hands. Balance has been off.       Patient Active Problem List   Diagnosis Code    ABAD (obstructive sleep apnea) G47.33    Palpitation R00.2    Venous insufficiency I87.2    Sinus node dysfunction (HCC) I49.5    Iron deficiency anemia D50.9    GERD (gastroesophageal reflux disease) K21.9  Esophageal stricture K22.2    Osteopenia M85.80    DJD (degenerative joint disease), lumbar M47.816    Macular degeneration G65.52    Diastolic dysfunction E65.8    Chronic cough R05    Psychotic depression (HCC) F32.3    Diabetes mellitus without complication (HCC) B77.8    Essential hypertension I10    Depression F32.9    Gallstones K80.20    S/P laparoscopic cholecystectomy Z90.49    Psychophysiological insomnia F51.04    Type 2 diabetes mellitus with nephropathy (HCC) E11.21    Type 2 diabetes mellitus with diabetic retinopathy (HCC) E11.319    Hyperkalemia E87.5    Type 2 diabetes mellitus with proliferative retinopathy ([facility] [address] E11.3599     Past Surgical History:   Procedure Laterality Date    BREAST SURGERY PROCEDURE UNLISTED  30 yrs ago    Breast Lumpectomy (side?)    EGD  2004    HX CHOLECYSTECTOMY  07/2018    HX COLONOSCOPY  2004    HX ENDOSCOPY  2004    HX HYSTERECTOMY      HX ORTHOPAEDIC  2005    cyst/tumor left hip    HX ORTHOPAEDIC  2014    Right Total Hip    Dr. Radha Marrufo J-W    HX PACEMAKER  11/4/2011    Biotluisa ZAMORA    HX PACEMAKER      STRESS TEST LEXISCAN/CARDIOLITE  10/3/11    normal perfusion, EF 72%     Social History     Social History    Marital status:      Spouse name: N/A    Number of children: N/A    Years of education: N/A     Occupational History    Not on file. Social History Main Topics    Smoking status: Never Smoker    Smokeless tobacco: Never Used    Alcohol use No    Drug use: No    Sexual activity: Not Currently     Partners: Male     Other Topics Concern    Not on file     Social History Narrative     Family History   Problem Relation Age of Onset    Heart Failure Mother     Heart Disease Father     Stroke Brother      Current Outpatient Prescriptions   Medication Sig    CARBOXYMETHYLCELLULOSE SODIUM (REFRESH OP) Apply  to eye daily.  spironolactone (ALDACTONE) 25 mg tablet Take 1 Tab by mouth daily.     QUEtiapine (SEROQUEL) 50 mg tablet Take 1 Tab by mouth nightly. (Patient taking differently: Take 25 mg by mouth nightly.)    bumetanide (BUMEX) 0.5 mg tablet Take 1 Tab by mouth daily.  dilTIAZem CD (CARDIZEM CD) 180 mg ER capsule Take 1 Cap by mouth daily.  clobetasol (OLUX) 0.05 % topical foam APPLY 1 APPLICATION TO AFFECTED AREA TWICE A DAY EXTERNALLY TO EARS 7 DAYS, AS NEEDED FOR FLARES    latanoprost (XALATAN) 0.005 % ophthalmic solution Administer 1 Drop to both eyes nightly. No current facility-administered medications for this visit. Allergies   Allergen Reactions    Meperidine Hives     Other reaction(s): Hives    Ace Inhibitors Unknown (comments)    Aldactone [Spironolactone] Other (comments)     hyperkalemia    Codeine Unknown (comments)    Miralax [Polyethylene Glycol 3350] Nausea and Vomiting    Other Plant, Animal, Environmental Rash     Bilateral Hearing Aids cause severe reaction per pt report    Percocet [Oxycodone-Acetaminophen] Unknown (comments)    Statins-Hmg-Coa Reductase Inhibitors Unknown (comments)    Sular [Nisoldipine] Rash    Zetia [Ezetimibe] Unknown (comments)     Immunization History   Administered Date(s) Administered    Influenza High Dose Vaccine PF 10/27/2016, 10/23/2017    Influenza Vaccine 09/25/2013    Influenza Vaccine Split 11/10/2010, 11/17/2011, 09/19/2012    Pneumococcal Conjugate (PCV-13) 10/27/2016    ZZZ-RETIRED (DO NOT USE) Pneumococcal Vaccine (Unspecified Type) 05/14/2001         Review of Systems   Constitutional: Negative for malaise/fatigue and weight loss. HENT: Negative for congestion and sore throat. Eyes: Negative for blurred vision and pain. Respiratory: Negative for cough, shortness of breath and wheezing. Cardiovascular: Negative for chest pain, palpitations and leg swelling. Gastrointestinal: Negative for abdominal pain, blood in stool, constipation, diarrhea, heartburn, nausea and vomiting.    Genitourinary: Negative for dysuria and hematuria. Musculoskeletal: Positive for back pain and falls. Negative for joint pain. Skin: Negative for itching and rash. Neurological: Negative for dizziness and headaches. Endo/Heme/Allergies: Negative for environmental allergies. Does not bruise/bleed easily. Psychiatric/Behavioral: Negative for depression and substance abuse. The patient is not nervous/anxious and does not have insomnia. Physical Exam   Constitutional: She appears well-developed and well-nourished. No distress. /82 (BP 1 Location: Right arm, BP Patient Position: Sitting)  Pulse 60  Temp 98.8 °F (37.1 °C) (Oral)   Resp 18  Ht 5' 1\" (1.549 m)  Wt 171 lb (77.6 kg)  SpO2 96%  BMI 32.31 kg/m2Body mass index is 32.31 kg/(m^2). HENT:   Mouth/Throat: Oropharynx is clear and moist.   Neck: No JVD present. Carotid bruit is not present. Cardiovascular: Normal rate, regular rhythm, normal heart sounds and intact distal pulses. Pulmonary/Chest: Effort normal and breath sounds normal.   Abdominal: Soft. Normal appearance and bowel sounds are normal. She exhibits no distension. There is no tenderness. Musculoskeletal: She exhibits no edema. Neurological: She is alert. She displays a negative Romberg sign. Gait abnormal.   Gait is slow, narrow based but short strided     Skin: Skin is warm and dry. She is not diaphoretic. Nursing note and vitals reviewed. ASSESSMENT and PLAN  Diagnoses and all orders for this visit:    1. Accelerated hypertension -resume aldactone. Log blood pressures at home while sitting, relaxed 3-5 times weekly and bring to next visit. Pt educated on goal BP of 130/80 on average or lower. Call office as soon as possible if BP's over 140/90 or below 110/50 on multiple occasions and/or with symptoms of dizziness, chest pain, shortness of breath, headache or ankle swelling. Recheck log and bp here in 2 week(s). -     spironolactone (ALDACTONE) 25 mg tablet;  Take 1 Tab by mouth daily. 2. Falls frequently - no new focal neurological symptoms/ findings. ? Deconditioning. Refer to PT and follow up closely. I advised using cane for now. -     Cliff [facility] [place]      Follow-up Disposition:  Return in about 2 weeks (around 4/25/2018).

## 2018-04-18 ENCOUNTER — HOSPITAL ENCOUNTER (OUTPATIENT)
Dept: PHYSICAL THERAPY | Age: 83
Discharge: HOME | End: 2018-04-18
Payer: MEDICARE

## 2018-04-18 PROCEDURE — 97530 THERAPEUTIC ACTIVITIES: CPT | Performed by: PHYSICAL THERAPIST

## 2018-04-18 PROCEDURE — 97162 PT EVAL MOD COMPLEX 30 MIN: CPT | Performed by: PHYSICAL THERAPIST

## 2018-04-18 PROCEDURE — G8979 MOBILITY GOAL STATUS: HCPCS | Performed by: PHYSICAL THERAPIST

## 2018-04-18 PROCEDURE — 97110 THERAPEUTIC EXERCISES: CPT | Performed by: PHYSICAL THERAPIST

## 2018-04-18 PROCEDURE — G8978 MOBILITY CURRENT STATUS: HCPCS | Performed by: PHYSICAL THERAPIST

## 2018-04-18 NOTE — PROGRESS NOTES
PT INITIAL EVALUATION NOTE - [facility] 2-15    Patient Name: Aiyana Sadler  Date:2018  : 1931  [x]  Patient  Verified  Payor: Shelley Tejada / Plan: [place] & B / Product Type: Medicare /    In time:0300  Out time:0400  Total Treatment Time (min): 60  Total Timed Codes (min): 60  1:1 Treatment Time ( [address] only): 60   Visit #: 1     Treatment Area: Gait instability [R26.81]    SUBJECTIVE  Pain Level (0-10 scale): 2/10  Any medication changes, allergies to medications, adverse drug reactions, diagnosis change, or new procedure performed?: [] No    [x] Yes (see summary sheet for update)  Subjective:    Patient reports that she has had 2 falls since 18. One time she feel after standing up from a chair in her living room. She thinks that may have been from her BP. She is seeing Dr. Ilana Montalvo for Cardiac symptoms and uncontrolled BP, she had a Pacemaker in February in . She is scheduled to have a stress test next week. Yesterday she had her pacemaker tested and it was reported that she is having arrhythmias. She is compliant with taking her BP meds but read 192/82 in the doctor's office earlier today. The other time that she fell in the past month she was walking on gravel in a parking lot and she tripped and fell. She was able crawl to the car door and pull herself up. She reports that she is having trouble getting in/out car, in/out bathtub to shower, and in/out of bed. She has a shower chair and handicap railings but has to step over the side of the tub which is difficult. She lives in the 1st level apartment in a house with her daughter's family. She is cooking all of her own meals and doing her own grocery shopping. She recently purchased a cane after her Doctor recommended it and uses it in and out of the home. She has a chair lift that she uses in her apartment, otherwise she can't get up by herself. She reports that she has some low back pain, heat makes it feel better. Denies abnormal sensation, or recent onset ms weakness (ie. Tripping over feet). PLOF: Grocery shops, drives to [employer] and [employer] and bridge games. Mechanism of Injury: gradual  Previous Treatment/Compliance: n/a  PMHx/Surgical Hx: pacemaker 2011, right THR 2014. Currently being followed by cardiologist for uncontrolled BP, scheduled for stress test in next week.   Work Hx: retired  Living Situation: Lives in 1 story apt with family upstairs  Pt Goals: to continue to live independently  Barriers: eye sight, hearing deficit, BP  Motivation: motivated  Substance use: none  FABQ Score: 19  Cognition: A & O x 4        OBJECTIVE/EXAMINATION    Posture:  Kyphosis  Other Observations:  Brandon's sign with sit<>stand, walks with SPC  Gait and Functional Mobility:  Shuffling step, decreased step length/foot clearance        Lumbar AROM:  Cervical AROM:        R  L  R  L  Flexion    50%        Extension   25%        Side Bending   25%  25%      Rotation               LOWER QUARTER   MUSCLE STRENGTH  KEY       R  L  0 - No Contraction  L1, L2 Psoas  3+  3+  1 - Trace   L3 Quads  4-  4-  2 - Poor   L4 Tib Ant  4  4  3 - Fair    L5 EHL  4  4  4 - Good   S1 FHL  4  4  5 - Normal   S2 Hams  4  4        Ziegler Balance Scale  ITEM DESCRIPTION      SCORE (0-4)    Sitting to standing      1  Standing unsupported      3  Sitting unsupported      4  Standing to sitting      1  Transfers       2  Standing with eyes closed     0  Standing with feet together     1  Reaching forward with outstretched arm   1  Retrieving object from floor     2  Turning to look behind      1  Turning 360 degrees      1  Placing alternate foot on stool     0  Standing with one foot in front     0  Standing on one foot      0          Total  17        15 min Therapeutic Exercise:  [] See flow sheet :   Rationale: increase ROM, increase strength and improve coordination to improve the patients ability to transfer/stand/walk without risk of fall    15 min Therapeutic Activity:  []  See flow sheet :transfer instruction   Rationale: increase strength, improve coordination and improve balance  to improve the patients ability to safely get in/out chair, car, shower               With   [] TE   [] TA   [] neuro   [] other: Patient Education: [x] Review HEP    [] Progressed/Changed HEP based on:   [] positioning   [] body mechanics   [] transfers   [] heat/ice application    [] other:      Other Objective/Functional Measures:    Pain Level (0-10 scale) post treatment: 01/0    ASSESSMENT/Changes in Function:     [x]  See Plan of [address] [place] [address], PT 4/18/2018  2:57 PM

## 2018-04-18 NOTE — PROGRESS NOTES
[address] [facility], [facility] [address]  Phone: [phone]  Fax: [phone]    Plan of Care/Statement of Necessity for Physical Therapy Services  2-15    Patient name: Jonathan Hunter  : 1931  Provider#: [id number]  Referral source: Melissa De La Vega MD      Medical/Treatment Diagnosis: Gait instability [R26.81]     Prior Hospitalization: see medical history     Comorbidities: Pacemaker/HBP  Prior Level of Function: independent with ADL's  Medications: Verified on Patient Summary List  Start of Care: 18      Onset Date: 2018   The Plan of Care and following information is based on the information from the initial evaluation. Assessment/ key information: Patient is a 80year old female who was referred to PT due to recent fall activity. Her symptoms are complicated by her ongoing cardiac complications. She presents with significant LE weakness, ROM and flexibility deficits. Functional assessments of balance were taken at evaluation and place her in the high fall risk category. She will benefit from skilled PT to address these problems and help her to perform all ADL's at [facility]. Evaluation Complexity History MEDIUM  Complexity : 1-2 comorbidities / personal factors will impact the outcome/ POC ; Examination MEDIUM Complexity : 3 Standardized tests and measures addressing body structure, function, activity limitation and / or participation in recreation  ;Presentation MEDIUM Complexity : Evolving with changing characteristics  ; Clinical Decision Making MEDIUM Complexity : FOTO score of 26-74  Overall Complexity Rating: MEDIUM    Problem List: pain affecting function, decrease ROM, decrease strength, edema affecting function, impaired gait/ balance, decrease ADL/ functional abilitiies and decrease activity tolerance   Treatment Plan may include any combination of the following: Therapeutic exercise, Therapeutic activities, Neuromuscular re-education, Gait/balance training, Patient education, Self Care training, Functional mobility training and Home safety training  Patient / Family readiness to learn indicated by: asking questions, trying to perform skills and interest  Persons(s) to be included in education: patient (P)  Barriers to Learning/Limitations: None  Patient Goal (s): to reduce fall risk  Patient Self Reported Health Status: fair  Rehabilitation Potential: good    Short Term Goals: To be accomplished in 6 weeks: To demonstrate compliance with safe transfer technique  To demonstrated compliance with HEP  To increase LE strength by 1/2 ms grade tolerate standing for 30 minutes to cook meals independently  To improve Ziegler to medium fall risk so patient can get in/out shower and car  Long Term Goals: To be accomplished in 12 weeks: To improve LE strength by 1 ms grade so patient can stand/walk for 1 hour to grocery shop  To improve Ziegler score to low fall risk so patient can get to all [employer] independently  To improve lumbar ROM to [facility] so patient can bend to the floor to dress  Frequency / Duration: Patient to be seen 2 times per week for 12 weeks. Patient/ Caregiver education and instruction: self care, activity modification and exercises    [x]  Plan of care has been reviewed with PTA    G-Codes (GP)  Mobility   Current  CL= 60-79%   Goal  CK= 40-59%      The severity rating is based on clinical judgment and the FOTO Score score. Certification Period: 04/18/18-07/18/18  Ameena Fall, PT 4/18/2018 4:45 PM    ________________________________________________________________________    I certify that the above Therapy Services are being furnished while the patient is under my care. I agree with the treatment plan and certify that this therapy is necessary.     [de-identified] Signature:____________________  Date:____________Time: _________

## 2018-04-23 ENCOUNTER — HOSPITAL ENCOUNTER (OUTPATIENT)
Dept: PHYSICAL THERAPY | Age: 83
Discharge: HOME | End: 2018-04-23
Payer: MEDICARE

## 2018-04-23 PROCEDURE — 97112 NEUROMUSCULAR REEDUCATION: CPT

## 2018-04-23 PROCEDURE — 97110 THERAPEUTIC EXERCISES: CPT

## 2018-04-23 NOTE — PROGRESS NOTES
PT DAILY TREATMENT NOTE - [facility] 2-15    Patient Name: Diya Davis  Date:2018  : 1931  [x]  Patient  Verified  Payor: [place] MEDICARE / Plan: [place] MEDICARE PART A & B / Product Type: Medicare /    In time:11:25a  Out time:12:20p  Total Treatment Time (min): 55  Total Timed Codes (min): 55  1:1 Treatment Time ( [address] only): 54   Visit #: 2     Treatment Area: Gait instability [R26.81]    SUBJECTIVE  Pain Level (0-10 scale): 0/10 \"sore\"  Any medication changes, allergies to medications, adverse drug reactions, diagnosis change, or new procedure performed?: [x] No    [] Yes (see summary sheet for update)  Subjective functional status/changes:   [] No changes reported  Pt. states she is sore after last visit. Pt. states that she has not had the stress test yet this week and is expecting to get a call to schedule it. Pt. states she has a visit with Dr. Frandy Solis on Wednesday to follow up on her BP medication.     OBJECTIVE      40 min Therapeutic Exercise:  [x] See flow sheet :   Rationale: increase strength, improve coordination, improve balance and increase proprioception to improve the patients ability to sit, stand, reach, ambulate, lift, carry and complete ADL's safely    15 min Neuromuscular Re-education:  [x]  See flow sheet :   Rationale: increase strength, improve coordination, improve balance and increase proprioception  to improve the patients ability to sit, stand, reach, ambulate, lift, carry and complete ADL's safely    With   [x] TE   [] TA   [] neuro   [] other: Patient Education: [x] Review HEP    [x] Progressed/Changed HEP based on:   [x] positioning   [x] body mechanics   [x] transfers   [] heat/ice application    [] other:      Other Objective/Functional Measures:   /86 prior to exercise, conferred with PT, Debbie Tejada, who said her BP was not as bad last time, however her doctors are aware of BP issues and would like her to continue PT.    EO stance: pt with min to mod sway, no LOB  EC stance: pt with 1-2 LOB  SLS: min strategies with 1 UE support    Pain Level (0-10 scale) post treatment: 0    ASSESSMENT/Changes in Function:  Pt demonstrates use of back of BLE to assist with sit t/f stand, when corrected pt required CGA for safety and controlled descent with sitting, SBA for sit to stand. Patient will continue to benefit from skilled PT services to modify and progress therapeutic interventions, address functional mobility deficits, address ROM deficits, address strength deficits, analyze and address soft tissue restrictions, analyze and cue movement patterns, analyze and modify body mechanics/ergonomics and assess and modify postural abnormalities to attain remaining goals. []  See Plan of Care  []  See progress note/recertification  []  See Discharge Summary         Progress towards goals / Updated goals:  Patient demonstrates good tolerance for advanced exercises requiring several rest breaks due to fatigue. Will continue progress patient as tolerated to reach all goals and improve safety with ADL's.     PLAN  []  Upgrade activities as tolerated     [x]  Continue plan of care  []  Update interventions per flow sheet       []  Discharge due to:_  []  Other:_      Levon Mahan 4/23/2018  11:36 AM

## 2018-04-25 ENCOUNTER — OFFICE VISIT (OUTPATIENT)
Dept: INTERNAL MEDICINE CLINIC | Age: 83
End: 2018-04-25

## 2018-04-25 ENCOUNTER — HOSPITAL ENCOUNTER (OUTPATIENT)
Dept: LAB | Age: 83
Discharge: HOME | End: 2018-04-25
Payer: MEDICARE

## 2018-04-25 VITALS
RESPIRATION RATE: 18 BRPM | HEIGHT: 61 IN | TEMPERATURE: 99.1 F | SYSTOLIC BLOOD PRESSURE: 139 MMHG | WEIGHT: 168.13 LBS | DIASTOLIC BLOOD PRESSURE: 82 MMHG | BODY MASS INDEX: 31.74 KG/M2 | HEART RATE: 50 BPM | OXYGEN SATURATION: 95 %

## 2018-04-25 DIAGNOSIS — E87.5 HYPERKALEMIA: ICD-10-CM

## 2018-04-25 DIAGNOSIS — F51.04 PSYCHOPHYSIOLOGICAL INSOMNIA: ICD-10-CM

## 2018-04-25 DIAGNOSIS — I10 ESSENTIAL HYPERTENSION: Primary | ICD-10-CM

## 2018-04-25 PROCEDURE — 80048 BASIC METABOLIC PNL TOTAL CA: CPT

## 2018-04-25 RX ORDER — METOPROLOL SUCCINATE 100 MG/1
TABLET, EXTENDED RELEASE ORAL DAILY
Status: ON HOLD | COMMUNITY
End: 2018-08-29

## 2018-04-25 RX ORDER — LANOLIN ALCOHOL/MO/W.PET/CERES
3 CREAM (GRAM) TOPICAL
COMMUNITY
Start: 2018-04-25 | End: 2018-05-30 | Stop reason: ALTCHOICE

## 2018-04-25 NOTE — PROGRESS NOTES
HISTORY OF PRESENT ILLNESS  Júnior Crews is a 80 y.o. female. HPI  Recently saw cardiologist for PM check. HR too fast - metoprolol resumed. dilt was stopped   hx of falls. She has been to PT twice and already feeling more stable with walking. No recurrent falls. Hypertension:  Júnior Crews is a 80 y.o. female with hypertension. with Chronic kidney disease stage 3   Medication change since last visit: resumed spironolactone  The patient reports:  taking medications as instructed, no medication side effects noted, home BP monitoring in range of 230'D systolic over 04'K diastolic, no chest pain on exertion, no dyspnea on exertion, no swelling of ankles, no orthostatic dizziness or lightheadedness, no palpitations. Lifestyle modification/social history: generally follows a low fat low cholesterol diet, nonsmoker    Lab Results   Component Value Date/Time    Sodium 148 (H) 12/29/2017 11:03 AM    Potassium 5.7 (H) 12/29/2017 11:03 AM    Chloride 110 (H) 12/29/2017 11:03 AM    CO2 17 (L) 12/29/2017 11:03 AM    Anion gap 2 (L) 08/01/2017 12:10 AM    Glucose CANCELED 02/08/2018 09:29 AM    BUN 16 12/29/2017 11:03 AM    Creatinine 0.91 12/29/2017 11:03 AM    BUN/Creatinine ratio 18 12/29/2017 11:03 AM    GFR est AA 66 12/29/2017 11:03 AM    GFR est non-AA 57 (L) 12/29/2017 11:03 AM    Calcium 9.9 12/29/2017 11:03 AM           ROS    Physical Exam   Constitutional: She appears well-developed and well-nourished. No distress. /82 (BP 1 Location: Left arm, BP Patient Position: Sitting)  Pulse (!) 50  Temp 99.1 °F (37.3 °C) (Oral)   Resp 18  Ht 5' 1\" (1.549 m)  Wt 168 lb 2 oz (76.3 kg)  SpO2 95%  BMI 31.77 kg/m2Body mass index is 31.77 kg/(m^2). HENT:   Mouth/Throat: Oropharynx is clear and moist.   Neck: No JVD present. Carotid bruit is not present. Cardiovascular: Normal rate, regular rhythm, normal heart sounds and intact distal pulses.     Pulmonary/Chest: Effort normal and breath sounds normal.   Musculoskeletal: She exhibits no edema. Neurological: She is alert. Skin: Skin is warm and dry. She is not diaphoretic. Nursing note and vitals reviewed. ASSESSMENT and PLAN  Diagnoses and all orders for this visit:    1. Essential hypertension -better controlled at home and here now on spironolactone. Log blood pressures at home while sitting, relaxed 3-5 times weekly and bring to next visit. Pt educated on goal BP of 130/80 on average or lower. Call office as soon as possible if BP's over 140/90 or below 110/50 on multiple occasions and/or with symptoms of dizziness, chest pain, shortness of breath, headache or ankle swelling. Recheck log and bp here in 2 month(s).    -     METABOLIC PANEL, BASIC    2. Hyperkalemia -back on spironolactone. recheck  -     METABOLIC PANEL, BASIC    3. Psychophysiological insomnia - she has tried seroquel which makes her wobbly in the morning. At her age, would avoid meds with strong sedative properties. Stop seroquel. Can try low dose melatonin. Follow-up Disposition:  Return in about 2 months (around 6/25/2018).

## 2018-04-25 NOTE — MR AVS SNAPSHOT
[facility] 
 
 
 [address] [place] [address] 
[phone] Patient: Diya Davis MRN:  [id number] Visit Information Date & Time Provider Department Dept. Phone Encounter #  
 4/25/2018 11:15 AM Vidya Mendez MD [facility] [address] [facility] [id number] Follow-up Instructions Return in about 2 months (around 6/25/2018). Upcoming Health Maintenance Date Due  
 EYE EXAM RETINAL OR DILATED Q1 7/20/1941 DTaP/Tdap/Td series (1 - Tdap) 7/20/1952 GLAUCOMA SCREENING Q2Y 7/20/1996 Bone Densitometry (Dexa) Screening 7/20/1996 FOOT EXAM Q1 9/14/2016 LIPID PANEL Q1 3/11/2017 MEDICARE YEARLY EXAM 3/14/2018 HEMOGLOBIN A1C Q6M 8/8/2018 MICROALBUMIN Q1 2/8/2019 Allergies as of 4/25/2018  Review Complete On: 4/11/2018 By: Vidya Mendez MD  
  
 Severity Noted Reaction Type Reactions Meperidine Medium 03/11/2016    Hives Other reaction(s): Hives Ace Inhibitors  05/14/2010    Unknown (comments) Aldactone [Spironolactone]  01/30/2018    Other (comments)  
 hyperkalemia Codeine  05/14/2010    Unknown (comments) Miralax [Polyethylene Glycol 3350]  05/14/2010    Nausea and Vomiting Other Plant, Animal, Environmental  07/14/2017    Rash Bilateral Hearing Aids cause severe reaction per pt report Percocet [Oxycodone-acetaminophen]  05/14/2010    Unknown (comments) Statins-hmg-coa Reductase Inhibitors  05/14/2010    Unknown (comments) Sular [Nisoldipine]  05/14/2010    Rash Zetia [Ezetimibe]  05/14/2010    Unknown (comments) Current Immunizations  Reviewed on 10/23/2017 Name Date Influenza High Dose Vaccine PF 10/23/2017, 10/27/2016 Influenza Vaccine 9/25/2013 Influenza Vaccine Split 9/19/2012, 11/17/2011, 11/10/2010 Pneumococcal Conjugate (PCV-13) 10/27/2016 ZZZ-RETIRED (DO NOT USE) Pneumococcal Vaccine (Unspecified Type) 5/14/2001 Not reviewed this visit You Were Diagnosed With   
  
 Codes Comments Primary insomnia    -  Primary ICD-10-CM: F51.01 
ICD-9-CM: 307.42 Hyperkalemia     ICD-10-CM: E87.5 ICD-9-CM: 276.7 Essential hypertension     ICD-10-CM: I10 
ICD-9-CM: 401.9 Vitals BP Pulse Temp Resp Height(growth percentile) Weight(growth percentile) 139/82 (BP 1 Location: Left arm, BP Patient Position: Sitting) (!) 50 99.1 °F (37.3 °C) (Oral) 18 5' 1\" (1.549 m) 168 lb 2 oz (76.3 kg) SpO2 BMI OB Status Smoking Status 95% 31.77 kg/m2 Hysterectomy Never Smoker Vitals History BMI and BSA Data Body Mass Index Body Surface Area 31.77 kg/m 2 1.81 m 2 Preferred Pharmacy Pharmacy Name Phone [id number] Inderjit Villagomez, [address] [place] [address] [facility] [phone] Your Updated Medication List  
  
   
This list is accurate as of 4/25/18 11:41 AM.  Always use your most recent med list.  
  
  
  
  
 bumetanide 0.5 mg tablet Commonly known as:  Rumalda Fritter Take 1 Tab by mouth daily. clobetasol 0.05 % topical foam  
Commonly known as:  OLUX  
APPLY 1 APPLICATION TO AFFECTED AREA TWICE A DAY EXTERNALLY TO EARS 7 DAYS, AS NEEDED FOR FLARES  
  
 melatonin 3 mg tablet Take 1 Tab by mouth nightly. metoprolol succinate 100 mg tablet Commonly known as:  TOPROL-XL Take  by mouth daily. REFRESH OP Apply  to eye daily. spironolactone 25 mg tablet Commonly known as:  ALDACTONE Take 1 Tab by mouth daily. XALATAN 0.005 % ophthalmic solution Generic drug:  latanoprost  
Administer 1 Drop to both eyes nightly. We Performed the Following METABOLIC PANEL, BASIC [18981 CPT(R)] Follow-up Instructions Return in about 2 months (around 6/25/2018). To-Do List   
 04/27/2018 11:30 AM  
  Appointment with Tatyana Benítez at [facility]  [address] ([phone]) Please remember to arrive at the hospital at least 30 minutes prior to your scheduled appointment time. When you come in for your appointment, please be sure to bring the therapy prescription the doctor gave you, your insurance card, and a list of the medicines you are taking. Also, please remember to wear comfortable, loose- fitting clothes. We look forward to seeing you. 04/30/2018 11:30 AM  
  Appointment with Mali Stovall at [facility] PT [address] ([phone]) Please remember to arrive at the hospital at least 30 minutes prior to your scheduled appointment time. When you come in for your appointment, please be sure to bring the therapy prescription the doctor gave you, your insurance card, and a list of the medicines you are taking. Also, please remember to wear comfortable, loose- fitting clothes. We look forward to seeing you. 05/04/2018 11:30 AM  
  Appointment with Jeannette Nova, PT at [facility] PT [address] ([phone]) Please remember to arrive at the hospital at least 30 minutes prior to your scheduled appointment time. When you come in for your appointment, please be sure to bring the therapy prescription the doctor gave you, your insurance card, and a list of the medicines you are taking. Also, please remember to wear comfortable, loose- fitting clothes. We look forward to seeing you. 05/07/2018 11:30 AM  
  Appointment with Mali Stovall at [facility] PT [address] ([phone]) Please remember to arrive at the hospital at least 30 minutes prior to your scheduled appointment time. When you come in for your appointment, please be sure to bring the therapy prescription the doctor gave you, your insurance card, and a list of the medicines you are taking. Also, please remember to wear comfortable, loose- fitting clothes. We look forward to seeing you. 05/11/2018 11:30 AM  
  Appointment with Jeannette Nova PT at [facility] PT [address] ([phone]) Please remember to arrive at the hospital at least 30 minutes prior to your scheduled appointment time. When you come in for your appointment, please be sure to bring the therapy prescription the doctor gave you, your insurance card, and a list of the medicines you are taking. Also, please remember to wear comfortable, loose- fitting clothes. We look forward to seeing you. 05/14/2018 11:30 AM  
  Appointment with Garth Pallas, PT at [facility] PT [address] ([phone]) Please remember to arrive at the hospital at least 30 minutes prior to your scheduled appointment time. When you come in for your appointment, please be sure to bring the therapy prescription the doctor gave you, your insurance card, and a list of the medicines you are taking. Also, please remember to wear comfortable, loose- fitting clothes. We look forward to seeing you. 05/18/2018 11:30 AM  
  Appointment with Tamia Rodriguez at [facility] PT [address] ([phone]) Please remember to arrive at the hospital at least 30 minutes prior to your scheduled appointment time. When you come in for your appointment, please be sure to bring the therapy prescription the doctor gave you, your insurance card, and a list of the medicines you are taking. Also, please remember to wear comfortable, loose- fitting clothes. We look forward to seeing you. 05/21/2018 11:30 AM  
  Appointment with Garth Pallas, PT at [facility] [place] [address] ([phone]) Please remember to arrive at the hospital at least 30 minutes prior to your scheduled appointment time. When you come in for your appointment, please be sure to bring the therapy prescription the doctor gave you, your insurance card, and a list of the medicines you are taking. Also, please remember to wear comfortable, loose- fitting clothes. We look forward to seeing you. 05/25/2018 11:30 AM  
  Appointment with Tamia Rodriguez at [facility] PT [address] ([phone]) Please remember to arrive at the hospital at least 30 minutes prior to your scheduled appointment time. When you come in for your appointment, please be sure to bring the therapy prescription the doctor gave you, your insurance card, and a list of the medicines you are taking. Also, please remember to wear comfortable, loose- fitting clothes. We look forward to seeing you. Introducing hospitals & HEALTH SERVICES! [facility] introduces [employer] patient portal. Now you can access parts of your medical record, email your doctor's office, and request medication refills online. 1. In your internet browser, go to [email or web address] 2. Click on the First Time User? Click Here link in the Sign In box. You will see the New Member Sign Up page. 3. Enter your MetaCure Access Code exactly as it appears below. You will not need to use this code after youve completed the sign-up process. If you do not sign up before the expiration date, you must request a new code. · MetaCure Access Code: [email or web address] Expires: 7/10/2018  9:42 AM 
 
4. Enter the last four digits of your Social Security Number (xxxx) and Date of Birth (mm/dd/yyyy) as indicated and click Submit. You will be taken to the next sign-up page. 5. Create a MetaCure ID. This will be your MetaCure login ID and cannot be changed, so think of one that is secure and easy to remember. 6. Create a MetaC[employer] password. You can change your password at any time. 7. Enter your Password Reset Question and Answer. This can be used at a later time if you forget your password. 8. Enter your e-mail address. You will receive e-mail notification when new information is available in [address] 9. Click Sign Up. You can now view and download portions of your medical record. 10. Click the Download Summary menu link to download a portable copy of your medical information.  
 
If you have questions, please visit the Frequently Asked Questions section of the [employer]. Remember, [employer] is NOT to be used for urgent needs. For medical emergencies, dial 911. Now available from your iPhone and Android! Please provide this summary of care documentation to your next provider. Your primary care clinician is listed as Barbara Bhandari. If you have any questions after today's visit, please call [phone].

## 2018-04-26 LAB
BUN SERPL-MCNC: 16 MG/DL (ref 8–27)
BUN/CREAT SERPL: 18 (ref 12–28)
CALCIUM SERPL-MCNC: 9.3 MG/DL (ref 8.7–10.3)
CHLORIDE SERPL-SCNC: 104 MMOL/L (ref 96–106)
CO2 SERPL-SCNC: 22 MMOL/L (ref 18–29)
CREAT SERPL-MCNC: 0.9 MG/DL (ref 0.57–1)
GFR SERPLBLD CREATININE-BSD FMLA CKD-EPI: 58 ML/MIN/1.73
GFR SERPLBLD CREATININE-BSD FMLA CKD-EPI: 67 ML/MIN/1.73
GLUCOSE SERPL-MCNC: 76 MG/DL (ref 65–99)
INTERPRETATION: NORMAL
POTASSIUM SERPL-SCNC: 4.9 MMOL/L (ref 3.5–5.2)
SODIUM SERPL-SCNC: 144 MMOL/L (ref 134–144)

## 2018-04-27 ENCOUNTER — HOSPITAL ENCOUNTER (OUTPATIENT)
Dept: PHYSICAL THERAPY | Age: 83
Discharge: HOME | End: 2018-04-27
Payer: MEDICARE

## 2018-04-27 PROCEDURE — 97110 THERAPEUTIC EXERCISES: CPT

## 2018-04-27 PROCEDURE — 97112 NEUROMUSCULAR REEDUCATION: CPT

## 2018-04-27 NOTE — PROGRESS NOTES
PT DAILY TREATMENT NOTE - [facility] 2-15    Patient Name: Amina Abbasi  Date:2018  : 1931  [x]  Patient  Verified  Payor: Sanjuana  / Plan: [place] & [place] / Product Type: Medicare /    In time: 11:30a  Out time: 12:25p  Total Treatment Time (min): 55   Total Timed Codes (min): 55  1:1 Treatment Time ([address] only): 30   Visit #: 3     Treatment Area: Gait instability [R26.81]    SUBJECTIVE  Pain Level (0-10 scale): 0  Any medication changes, allergies to medications, adverse drug reactions, diagnosis change, or new procedure performed?: [] No    [x] Yes (see summary sheet for update)  Subjective functional status/changes:   [] No changes reported  Pt. States she was taken off her sleeping medication and it was switched to melatonin. Pt. Reports that since she stopped taking the medication she feels more like herself and a huge change in symptoms. Pt. States that her and her doctor thinks the sleeping medication was the cause for her dizziness. OBJECTIVE    30 min Therapeutic Exercise:  [x] See flow sheet :   Rationale: increase ROM, increase strength, improve coordination and improve balance to improve the patients ability to sit, stand, reach, ambulate, lift, carry and complete ADL's safely. 15 min Therapeutic Activity:  [x]  See flow sheet :   Rationale: increase strength, improve coordination and improve balance  to improve the patients ability to sit, stand, reach, ambulate, lift, carry and complete ADL's safely. 10 min Neuromuscular Education  [x]  See flow sheet :   Rationale: increase strength, improve coordination and improve balance  to improve the patients ability to sit, stand, reach, ambulate, lift, carry and complete ADL's safely.           With   [] TE   [] TA   [] neuro   [] other: Patient Education: [x] Review HEP    [] Progressed/Changed HEP based on:   [] positioning   [] body mechanics   [] transfers   [] heat/ice application    [] other:      Other Objective/Functional Measures:   Patient arrived at PT today without cane. /82 prior to exercise. Mod fatigue with interventions requiring several rest breaks    EO stance: min to mod sway, no LOB  EC: no LOB  SLS: min strategies with 1 UE support     Pain Level (0-10 scale) post treatment: 0    ASSESSMENT/Changes in Function:     Patient will continue to benefit from skilled PT services to modify and progress therapeutic interventions, address functional mobility deficits, address ROM deficits, address strength deficits, analyze and address soft tissue restrictions, analyze and cue movement patterns, analyze and modify body mechanics/ergonomics, assess and modify postural abnormalities and address imbalance/dizziness to attain remaining goals. []  See Plan of Care  []  See progress note/recertification  []  See Discharge Summary         Progress towards goals / Updated goals:  Pt. Continues to demonstrate good tolerance for advanced exercises and requires several rest breaks due to fatigue. Will continue to progress patient as tolerated to reach all goals and improve safety with ADL's.      PLAN  [x]  Upgrade activities as tolerated     [x]  Continue plan of care  []  Update interventions per flow sheet       []  Discharge due to:_  []  Other:_      Slade Metcalf, SPTA  1 TE  [id number], PTA 4/27/2018  12:26 PM

## 2018-04-30 ENCOUNTER — HOSPITAL ENCOUNTER (OUTPATIENT)
Dept: PHYSICAL THERAPY | Age: 83
Discharge: HOME | End: 2018-04-30
Payer: MEDICARE

## 2018-04-30 PROCEDURE — 97112 NEUROMUSCULAR REEDUCATION: CPT

## 2018-04-30 NOTE — PROGRESS NOTES
PT DAILY TREATMENT NOTE - [facility] 2-15    Patient Name: Geri Singh  Date:2018  : 1931  [x]  Patient  Verified  Payor: [place] MEDICARE / Plan: [place] MEDICARE PART A & B / Product Type: Medicare /    In time: 11:30a  Out time: 12:25p  Total Treatment Time (min): 55  Total Timed Codes (min): 55  1:1 Treatment Time ( [address] only): 30   Visit #: 4     Treatment Area: Gait instability [R26.81]    SUBJECTIVE  Pain Level (0-10 scale): 0  Any medication changes, allergies to medications, adverse drug reactions, diagnosis change, or new procedure performed?: [x] No    [] Yes (see summary sheet for update)  Subjective functional status/changes:   [] No changes reported  Pt. Reports she had a bad day yesterday when her blood pressure was 165/65 and she almost \"blacked out\". Pt. States that she thinks blood pressure is still leveling out after medication change. Pt. States she is able to get in and out of her car more easily since starting PT.    OBJECTIVE    30 min Therapeutic Exercise:  [x] See flow sheet :   Rationale: increase ROM, increase strength, improve coordination and improve balance to improve the patients ability to sit, stand, reach, ambulate, lift, carry and complete ADL's.    25 min Neuromuscular Re-education:  [x]  See flow sheet :   Rationale: increase strength, improve coordination and improve balance  to improve the patients ability to sit, stand, reach, ambulate, lift, carry and complete ADL's.        With   [] TE   [] TA   [] neuro   [] other: Patient Education: [x] Review HEP    [] Progressed/Changed HEP based on:   [] positioning   [] body mechanics   [] transfers   [] heat/ice application    [] other:      Other Objective/Functional Measures:   BP prior to tx.: 132/80   BP post tx.: 120/82    Pain Level (0-10 scale) post treatment: 0    ASSESSMENT/Changes in Function:     Patient will continue to benefit from skilled PT services to modify and progress therapeutic interventions, address functional mobility deficits, address ROM deficits, address strength deficits, analyze and address soft tissue restrictions, analyze and cue movement patterns, analyze and modify body mechanics/ergonomics and assess and modify postural abnormalities to attain remaining goals. []  See Plan of Care  []  See progress note/recertification  []  See Discharge Summary         Progress towards goals / Updated goals:  Pt. Continues to demonstrate good tolerance for advanced exercises and requires less rest breaks for fatigue. Will continue to progress patient as tolerated to reach all goals and improve safety with ADL's.     PLAN  [x]  Upgrade activities as tolerated     [x]  Continue plan of care  []  Update interventions per flow sheet       []  Discharge due to:_  []  Other:_      Tc Reed, GLADISA  2 NM     Irineo Danrell, ROSIE 4/30/2018  11:42 AM

## 2018-05-04 ENCOUNTER — HOSPITAL ENCOUNTER (OUTPATIENT)
Dept: PHYSICAL THERAPY | Age: 83
Discharge: HOME | End: 2018-05-04
Payer: MEDICARE

## 2018-05-04 PROCEDURE — 97110 THERAPEUTIC EXERCISES: CPT | Performed by: PHYSICAL THERAPIST

## 2018-05-04 NOTE — PROGRESS NOTES
PT DAILY TREATMENT NOTE - [facility] 2-15    Patient Name: Luz Cornejo  Date:2018  : 1931  [x]  Patient  Verified  Payor: [place] MEDICARE / Plan: [place] MEDICARE PART A & B / Product Type: Medicare /    In time:1130  Out time:1215  Total Treatment Time (min): 45  Total Timed Codes (min): 30  1:1 Treatment Time ( only): 30   Visit #: 4     Treatment Area: Gait instability [R26.81]    SUBJECTIVE  Pain Level (0-10 scale): 0/10  Any medication changes, allergies to medications, adverse drug reactions, diagnosis change, or new procedure performed?: [x] No    [] Yes (see summary sheet for update)  Subjective functional status/changes:   [] No changes reported  Patient reports that she completed her stress testing last week and should receive her results for future testing and plans for her Pacemaker and difibrillator. OBJECTIVE      45 min Therapeutic Exercise:  [] See flow sheet :   Rationale: increase ROM, increase strength, improve coordination, improve balance and increase proprioception to improve the patients ability to walk on uneven surfaces and navigate curbs and stairs without risk of falling                   With   [] TE   [] TA   [] neuro   [] other: Patient Education: [x] Review HEP    [] Progressed/Changed HEP based on:   [] positioning   [] body mechanics   [] transfers   [] heat/ice application    [] other:      Other Objective/Functional Measures: pre tx BP: 140/80, post tx: 145/80     Pain Level (0-10 scale) post treatment: 0/10    ASSESSMENT/Changes in Function:     Patient will continue to benefit from skilled PT services to modify and progress therapeutic interventions, address functional mobility deficits, address ROM deficits, address strength deficits, analyze and address soft tissue restrictions, analyze and cue movement patterns and analyze and modify body mechanics/ergonomics to attain remaining goals.      []  See Plan of Care  []  See progress note/recertification  []  See Discharge Summary         Progress towards goals / Updated goals:  Patient tolerated progression of exercises without excessive fatigue or changes in BP    PLAN  []  Upgrade activities as tolerated     [x]  Continue plan of care  []  Update interventions per flow sheet       []  Discharge due to:_  []  Other:_      Krista Kapoor, PT 5/4/2018  11:35 AM

## 2018-05-07 ENCOUNTER — HOSPITAL ENCOUNTER (OUTPATIENT)
Dept: PHYSICAL THERAPY | Age: 83
Discharge: HOME | End: 2018-05-07
Payer: MEDICARE

## 2018-05-07 PROCEDURE — 97110 THERAPEUTIC EXERCISES: CPT

## 2018-05-07 NOTE — PROGRESS NOTES
PT DAILY TREATMENT NOTE - [facility] 2-15    Patient Name: Krystal Brasher  Date:2018  : 1931  [x]  Patient  Verified  Payor: [place] MEDICARE / Plan: [place] MEDICARE PART A & B / Product Type: Medicare /    In time:11:30  Out time:12:15  Total Treatment Time (min): 45  Total Timed Codes (min): 30  1:1 Treatment Time ( [address] only): 30   Visit #: 6     Treatment Area: Gait instability [R26.81]    SUBJECTIVE  Pain Level (0-10 scale): 0/10  Any medication changes, allergies to medications, adverse drug reactions, diagnosis change, or new procedure performed?: [x] No    [] Yes (see summary sheet for update)  Subjective functional status/changes:   [] No changes reported  Patient reports she sees her Cardiologist on Wednesday to get the results of her test and will let us know if she is able to come in on Friday or not. Patient reports she feels like she is able to do more at home and is getting stronger.       OBJECTIVE      45 min Therapeutic Exercise:  [x] See flow sheet :   Rationale: increase ROM, increase strength, improve coordination, improve balance and increase proprioception to improve the patients ability to walk on uneven surfaces and navigate curbs and stairs without risk of falling           With   [] TE   [] TA   [] neuro   [] other: Patient Education: [x] Review HEP    [] Progressed/Changed HEP based on:   [] positioning   [] body mechanics   [] transfers   [] heat/ice application    [] other:      Other Objective/Functional Measures: pre tx BP: 137/75, pt with mod increased fatigue with interventions today and required several rest breaks toward end of session     Pain Level (0-10 scale) post treatment: 0/10    ASSESSMENT/Changes in Function:     Patient will continue to benefit from skilled PT services to modify and progress therapeutic interventions, address functional mobility deficits, address ROM deficits, address strength deficits, analyze and address soft tissue restrictions, analyze and cue movement patterns and analyze and modify body mechanics/ergonomics to attain remaining goals. []  See Plan of Care  []  See progress note/recertification  []  See Discharge Summary         Progress towards goals / Updated goals:  Patient doing well with all interventions and demonstrates improved balance with decreased sway and strategies present. Patient will do well with continued program to reach all goals and progression as tolerated.      PLAN  []  Upgrade activities as tolerated     [x]  Continue plan of care  []  Update interventions per flow sheet       []  Discharge due to:_  []  Other:_      [place] [facility] 5/7/2018  11:35 AM

## 2018-05-11 ENCOUNTER — HOSPITAL ENCOUNTER (OUTPATIENT)
Dept: PHYSICAL THERAPY | Age: 83
Discharge: HOME | End: 2018-05-11
Payer: MEDICARE

## 2018-05-11 PROCEDURE — 97110 THERAPEUTIC EXERCISES: CPT | Performed by: PHYSICAL THERAPIST

## 2018-05-11 NOTE — PROGRESS NOTES
PT DAILY TREATMENT NOTE - [facility] 2-15    Patient Name: Amina Abbasi  Date:2018  : 1931  [x]  Patient  Verified  [place]or: [place] MED[place]ARE / Plan: [place] MEDICARE PART A & B / Product Type: Medicare /    In time:1140  Out time:1225  Total Treatment Time (min): 45  Total Timed Codes (min): 45  1:1 Treatment Time ([address] only): 39  Visit #: 7     Treatment Area: Gait instability [R26.81]    SUBJECTIVE  Pain Level (0-10 scale): 0/10  Any medication changes, allergies to medications, adverse drug reactions, diagnosis change, or new procedure performed?: [x] No    [] Yes (see summary sheet for update)  Subjective functional status/changes:   [] No changes reported  Patient reports that she is schedule to have her defibrillator surgery on May 31st.  She is going to need 2 weeks off after the surgery and does not know what her limitations will be, but she thinks her Doctor wants her to continue with PT. Overall she reports that she feels stronger and is no longer having trouble getting in/out of her car or chair at home. OBJECTIVE      45 min Therapeutic Exercise:  [x] See flow sheet :   Rationale: increase ROM, increase strength, improve coordination and improve balance to improve the patients ability to navigate stairs, uneven surfaces, transfer without risk of falling              With   [] TE   [] TA   [] neuro   [] other: Patient Education: [x] Review HEP    [] Progressed/Changed HEP based on:   [] positioning   [] body mechanics   [] transfers   [] heat/ice application    [] other:      Other Objective/Functional Measures: Progressed exercises for HEP     Pain Level (0-10 scale) post treatment: 0/10    ASSESSMENT/Changes in Function:     Progress of HEP and the plan of placing patient on hold from PT immediately following her surgery was discussed today. Patient would like to come back to PT once she is cleared by her physician to continue working on her strength goals.     Patient will continue to benefit from skilled PT services to modify and progress therapeutic interventions, address functional mobility deficits, address ROM deficits, address strength deficits, analyze and cue movement patterns, analyze and modify body mechanics/ergonomics and assess and modify postural abnormalities to attain remaining goals.      []  See Plan of Care  []  See progress note/recertification  []  See Discharge Summary         Progress towards goals / Updated goals:  Excellent progress being made towards functional goals     PLAN  [x]  Upgrade activities as tolerated     []  Continue plan of care  []  Update interventions per flow sheet       []  Discharge due to:_  []  Other:_      Cristina Iverson, PT 5/11/2018  12:29 PM

## 2018-05-14 ENCOUNTER — HOSPITAL ENCOUNTER (OUTPATIENT)
Dept: PHYSICAL THERAPY | Age: 83
Discharge: HOME | End: 2018-05-14
Payer: MEDICARE

## 2018-05-14 PROCEDURE — 97110 THERAPEUTIC EXERCISES: CPT | Performed by: PHYSICAL THERAPIST

## 2018-05-14 NOTE — PROGRESS NOTES
PT DAILY TREATMENT NOTE - [facility] 2-15    Patient Name: Maine Kumari  Date:2018  : 1931  [x]  Patient  Verified  Payor: [place] MEDICARE / Plan: [place] MEDICARE PART A & B / Product Type: Medicare /    In time:1115  Out time:1200  Total Treatment Time (min): 45  Total Timed Codes (min): 45  1:1 Treatment Time ([address] only): 39   Visit #: 8     Treatment Area: Gait instability [R26.81]    SUBJECTIVE  Pain Level (0-10 scale): 0/10  Any medication changes, allergies to medications, adverse drug reactions, diagnosis change, or new procedure performed?: [x] No    [] Yes (see summary sheet for update)  Subjective functional status/changes:   [] No changes reported  Patient reports that she feels much stronger and is able to walk up and down her driveway without any difficulty. She can get up her daughters steps now without using the railing. OBJECTIVE        45 min Therapeutic Exercise:  [] See flow sheet :   Rationale: increase ROM, increase strength, improve coordination and improve balance to improve the patients ability to navigate stairs, uneven surfaces without falling              With   [] TE   [] TA   [] neuro   [] other: Patient Education: [x] Review HEP    [] Progressed/Changed HEP based on:   [] positioning   [] body mechanics   [] transfers   [] heat/ice application    [] other:      Other Objective/Functional Measures: progressed therex per flow sheet     Pain Level (0-10 scale) post treatment: 0/10    ASSESSMENT/Changes in Function:     Patient will continue to benefit from skilled PT services to modify and progress therapeutic interventions, address functional mobility deficits, address ROM deficits, address strength deficits, analyze and cue movement patterns, analyze and modify body mechanics/ergonomics and address imbalance/dizziness to attain remaining goals.      []  See Plan of Care  []  See progress note/recertification  []  See Discharge Summary         Progress towards goals / Updated goals:  Patient is making excellent progress towards all goals, will place case on hold s/p next week surgery until MD clearance    PLAN  []  Upgrade activities as tolerated     [x]  Continue plan of care  []  Update interventions per flow sheet       []  Discharge due to:_  []  Other:_      Do Lepe, PT 5/14/2018  11:25 AM

## 2018-05-18 ENCOUNTER — HOSPITAL ENCOUNTER (OUTPATIENT)
Dept: PHYSICAL THERAPY | Age: 83
Discharge: HOME | End: 2018-05-18
Payer: MEDICARE

## 2018-05-18 PROCEDURE — 97110 THERAPEUTIC EXERCISES: CPT

## 2018-05-18 NOTE — PROGRESS NOTES
PT DAILY TREATMENT NOTE - [facility] 2-15    Patient Name: Letha Almonte  Date:2018  : 1931  [x]  Patient  Verified  Payor: [place] MEDICARE / Plan: [place] MEDICARE PART A & B / Product Type: Medicare /    In time: 11:15a  Out time: 12:15p  Total Treatment Time (min): 60  Total Timed Codes (min): 60  1:1 Treatment Time ( [address] only): 45   Visit #: 9     Treatment Area: Gait instability [R26.81]    SUBJECTIVE  Pain Level (0-10 scale): 0  Any medication changes, allergies to medications, adverse drug reactions, diagnosis change, or new procedure performed?: [x] No    [] Yes (see summary sheet for update)  Subjective functional status/changes:   [] No changes reported  Pt. States she is having more testing done on the  before MD decide to change her pacemaker. Pt. States she has been having \"small\" HA around her L temple each time she leaves for the last few sessions. Pt. States she feels better after laying down and napping. Pt. Reports she thinks it is from the walks to her car in the parking lot. OBJECTIVE    60 min Therapeutic Exercise:  [x] See flow sheet :   Rationale: increase ROM, increase strength, improve coordination, improve balance and increase proprioception to improve the patients ability to navigate stairs, ambulate on uneven surfaces, and transfer without risk of falling. With   [] TE   [] TA   [] neuro   [] other: Patient Education: [x] Review HEP    [x] Progressed/Changed HEP based on:   [] positioning   [] body mechanics   [] transfers   [] heat/ice application    [x] other: Hip abduction, step ups, heel raises/toes raises, marches, LAQ, lunges     Other Objective/Functional Measures:  /82 during exercises   Mod fatigue with interventions. /72 post tx.      Pain Level (0-10 scale) post treatment: 0    ASSESSMENT/Changes in Function:     Patient will continue to benefit from skilled PT services to modify and progress therapeutic interventions, address functional mobility deficits, address ROM deficits, address strength deficits, analyze and address soft tissue restrictions, analyze and cue movement patterns, analyze and modify body mechanics/ergonomics and assess and modify postural abnormalities to attain remaining goals. []  See Plan of Care  []  See progress note/recertification  []  See Discharge Summary         Progress towards goals / Updated goals:  Patient demonstrates good tolerance for all interventions with mod fatigue and improved balance with decreased sway and strategies present. Patient will do well with continued program to reach all goals and progression as tolerated.      PLAN  [x]  Upgrade activities as tolerated     [x]  Continue plan of care  []  Update interventions per flow sheet       []  Discharge due to:_  []  Other:_      Jolanta 191,, SPTA 5/18/2018  11:37 AM

## 2018-05-21 ENCOUNTER — HOSPITAL ENCOUNTER (OUTPATIENT)
Dept: PHYSICAL THERAPY | Age: 83
Discharge: HOME | End: 2018-05-21
Payer: MEDICARE

## 2018-05-21 PROCEDURE — G8979 MOBILITY GOAL STATUS: HCPCS | Performed by: PHYSICAL THERAPIST

## 2018-05-21 PROCEDURE — G8978 MOBILITY CURRENT STATUS: HCPCS | Performed by: PHYSICAL THERAPIST

## 2018-05-21 PROCEDURE — 97110 THERAPEUTIC EXERCISES: CPT | Performed by: PHYSICAL THERAPIST

## 2018-05-21 NOTE — PROGRESS NOTES
PT DAILY TREATMENT NOTE - [facility] 2-15    Patient Name: Letha Almonte  Date:2018  : 1931  [x]  Patient  Verified  Payor: [place] MEDICARE / Plan: [place] MEDICARE PART A & B / Product Type: Medicare /    In time:11:30  Out time:11:45  Total Treatment Time (min): 45  Total Timed Codes (min): 30  1:1 Treatment Time ( [address] only): 30   Visit #: 10     Treatment Area: Gait instability [R26.81]    SUBJECTIVE  Pain Level (0-10 scale): 0/10  Any medication changes, allergies to medications, adverse drug reactions, diagnosis change, or new procedure performed?: [x] No    [] Yes (see summary sheet for update)  Subjective functional status/changes:   [] No changes reported  I feel so much stronger. I can get out of my chair and walk up my driveway so much easier now. Even my family notices. OBJECTIVE        45 min Therapeutic Exercise:  [] See flow sheet :   Rationale: increase ROM, increase strength, improve coordination and improve balance to improve the patients ability to navigate steps and uneven surfaces, transfer, get in/out bath              With   [] TE   [] TA   [] neuro   [] other: Patient Education: [x] Review HEP    [] Progressed/Changed HEP based on:   [] positioning   [] body mechanics   [] transfers   [] heat/ice application    [] other:      Other Objective/Functional Measures: Timed up and Go: 15 secs.   Ziegler Balance Scale    ITEM DESCRIPTION      SCORE (0-4)    Sitting to standing      1  Standing unsupported      3  Sitting unsupported      4  Standing to sitting      3  Transfers       3  Standing with eyes closed     2  Standing with feet together     1  Reaching forward with outstretched arm   1  Retrieving object from floor     2  Turning to look behind      2  Turning 360 degrees      1  Placing alternate foot on stool     1  Standing with one foot in front     1  Standing on one foot      1          Total  28 (medium)     Pain Level (0-10 scale) post treatment: 0/10    ASSESSMENT/Changes in Function:     Patient will continue to benefit from skilled PT services to modify and progress therapeutic interventions, address functional mobility deficits, address ROM deficits, address strength deficits, analyze and address soft tissue restrictions, analyze and cue movement patterns and analyze and modify body mechanics/ergonomics to attain remaining goals. []  See Plan of Care  []  See progress note/recertification  []  See Discharge Summary         Progress towards goals / Updated goals: MET  Short Term Goals: To be accomplished in 6 weeks: To demonstrate compliance with safe transfer technique  To demonstrated compliance with HEP  To increase LE strength by 1/2 ms grade tolerate standing for 30 minutes to cook meals independently  To improve Ziegler to medium fall risk so patient can get in/out shower and car  Long Term Goals:  To be accomplished in 12 weeks:IN Progress  To improve LE strength by 1 ms grade so patient can stand/walk for 1 hour to grocery shop  To improve Ziegler score to low fall risk so patient can get to all Dr. [employer] independently  To improve lumbar ROM to [facility] so patient can bend to the floor to dress  Frequency / Duration: Patient to be seen 2 times per week for 12 weeks    PLAN  []  Upgrade activities as tolerated     [x]  Continue plan of care  []  Update interventions per flow sheet       []  Discharge due to:_  []  Other:_      Ameena Fall, PT 5/21/2018  2:10 PM

## 2018-05-21 NOTE — PROGRESS NOTES
[facility]  [address] Tarik Camara, [address] [place] [address] Drive  Phone: [phone]      Fax:  [phone]    Progress Note    Name: Fabian Goodrich   : 1931   MD: Jv Wakefield MD       Treatment Diagnosis: Gait instability [R26.81]  Start of Care: 2018    Visits from Start of Care: 10  Missed Visits: 0    Summary of Care:Ms. Lita Christianson has been seen for 10 visits for balance and strength training to reduce her fall risk in and outside of the home. She is making excellent progress towards all of her goals and is currently at a medium fall risk status without the use of her cane. She has been encouraged to use her cane outside the home. She is scheduled for cardiac surgery in the next week. After clearance from her surgeon she will continue to benefit from skilled PT to address her strength and balance deficits. Assessment / Recommendations:     Short Term Goals: To be accomplished in 6 weeks:MET  To demonstrate compliance with safe transfer technique  To demonstrated compliance with HEP  To increase LE strength by 1/2 ms grade tolerate standing for 30 minutes to cook meals independently  To improve Ziegler to medium fall risk so patient can get in/out shower and car  Long Term Goals: To be accomplished in 12 weeks: In progress  To improve LE strength by 1 ms grade so patient can stand/walk for 1 hour to grocery shop  To improve Ziegler score to low fall risk so patient can get to all Dr. Eri Duarte independently  To improve lumbar ROM to [facility] so patient can bend to the floor to dress  Frequency / Duration: Patient to be seen 2 times per week for 12 weeks    G-Codes (GP)  Mobility  [id number] Current  CK= 40-59%   Goal  CK= 40-59%      The severity rating is based on the FOTO Score score.     Michelle Ramirez, PT 2018 2:18 PM    ________________________________________________________________________  NOTE TO PHYSICIAN:  Please complete the following and fax to: [facility]: Fax: [phone]. Sanjeev Raeann Retain this original for your records. If you are unable to process this request in 24 hours, please contact our office.        ____ I have read the above report and request that my patient continue therapy with the following changes/special instructions:  ____ I have read the above report and request that my patient be discharged from therapy    Physician's Signature:_________________ Date:___________Time:__________

## 2018-05-25 ENCOUNTER — HOSPITAL ENCOUNTER (OUTPATIENT)
Dept: PHYSICAL THERAPY | Age: 83
Discharge: HOME | End: 2018-05-25
Payer: MEDICARE

## 2018-05-25 ENCOUNTER — TELEPHONE (OUTPATIENT)
Dept: INTERNAL MEDICINE CLINIC | Age: 83
End: 2018-05-25

## 2018-05-25 PROCEDURE — 97110 THERAPEUTIC EXERCISES: CPT

## 2018-05-25 NOTE — TELEPHONE ENCOUNTER
Received call from PT at [facility] stating that patients BP was 140's/80's on arrival and continued to elevate with activity so Physical therapy was stopped. Patient stated that  2 days ago she stopped taking her melatonin that Dr. Kwan Balderrama recommended. She has taken Seroquel that was discontinued last month following her daughters instructions. She's only took 1/2 a tablet last night and was complaining of hallucinations. She thinks that's the reason why her blood pressure is elevated. Physical Therapist thought Dr. Kwan Balderrama needed to know what was going on with the patient.

## 2018-05-25 NOTE — PROGRESS NOTES
PT DAILY TREATMENT NOTE - [facility] 2-15    Patient Name: Maria Isabel Cole  Date:2018  : 1931  [x]  Patient  Verified  Payor: [place] MEDICARE / Plan: [place] MEDICARE PART A & B / Product Type: Medicare /    In time: 11:25a  Out time:11:55a  Total Treatment Time (min): 30  Total Timed Codes (min): 30  1:1 Treatment Time ( only): 30   Visit #: 11     Treatment Area: Gait instability [R26.81]    SUBJECTIVE  Pain Level (0-10 scale): 0/10 \"stiff\"  Any medication changes, allergies to medications, adverse drug reactions, diagnosis change, or new procedure performed?: [x] No    [] Yes (see summary sheet for update)  Subjective functional status/changes:   [] No changes reported  Pt. States is is stiff today because her daughter took her to the grocery store yesterday. Patient states she had not slept for two days on Monday when she had to take 1/4 of a sleeping pill per the request of her daughter and grand daughter who is a nurse. Pt. States she then took 1/2 of another sleeping pill Tuesday night. Pt. States she had started seeing things such as people in her home before taking the sleeping pill Monday and getting needed rest. Pt. States she is feeling \"slow\" today and not herself. OBJECTIVE    30 min Therapeutic Exercise:  [x] See flow sheet :   Rationale: increase ROM, increase strength, improve coordination, improve balance and increase proprioception to improve the patients ability to navigate stairs, ambulate on uneven surfaces, and transfer without risk of falling. With   [] TE   [] TA   [] neuro   [] other: Patient Education: [x] Review HEP    [] Progressed/Changed HEP based on:   [] positioning   [] body mechanics   [] transfers   [] heat/ice application    [] other:      Other Objective/Functional Measures:   BP:  Prior to treatment: 148/90  During exercise: 150/92 with symptoms: SOB, fatigue    Dima cleared BP before starting therapeutic interventions.  BP taken after reports of increased fatigue, SOB at which point vitals had increased and exercises were discontinued. Edinson VELEZ PT Dorindaemiliano Moriahron, notified. Further discussions with patient she had reported she started to take the sleeping medication Dr. Jason Downey has discontinued and stopped taking melatonin due to not sleeping for several days. Patient reports she had started to have hallucinations again as she had not been sleeping. PTAMarquis, called MD office and spoke to nurse about new developments in symptoms and will confer with Dr. Jason Downey and call patient this afternoon. Upon leaving patient became very upset as she does not see her family much, though she is living in the [facility] of their house. Pain Level (0-10 scale) post treatment: 0    ASSESSMENT/Changes in Function:     Patient will continue to benefit from skilled PT services to modify and progress therapeutic interventions, address functional mobility deficits, address ROM deficits, address strength deficits, analyze and address soft tissue restrictions, analyze and cue movement patterns, analyze and modify body mechanics/ergonomics and assess and modify postural abnormalities to attain remaining goals. []  See Plan of Care  []  See progress note/recertification  []  See Discharge Summary         Progress towards goals / Updated goals:  Patient unable to progress at this time.      PLAN  []  Upgrade activities as tolerated     [x]  Continue plan of care  []  Update interventions per flow sheet       []  Discharge due to:_  []  Other:_      [address] [facility] [place], [address] [address] 5/25/2018  12:22 PM

## 2018-05-30 ENCOUNTER — OFFICE VISIT (OUTPATIENT)
Dept: INTERNAL MEDICINE CLINIC | Age: 83
End: 2018-05-30

## 2018-05-30 VITALS
SYSTOLIC BLOOD PRESSURE: 158 MMHG | OXYGEN SATURATION: 96 % | HEIGHT: 61 IN | TEMPERATURE: 98.9 F | BODY MASS INDEX: 31.98 KG/M2 | DIASTOLIC BLOOD PRESSURE: 90 MMHG | HEART RATE: 72 BPM | WEIGHT: 169.38 LBS | RESPIRATION RATE: 18 BRPM

## 2018-05-30 DIAGNOSIS — F51.04 PSYCHOPHYSIOLOGICAL INSOMNIA: ICD-10-CM

## 2018-05-30 DIAGNOSIS — I10 ESSENTIAL HYPERTENSION: ICD-10-CM

## 2018-05-30 DIAGNOSIS — Z71.89 ADVANCED CARE PLANNING/COUNSELING DISCUSSION: ICD-10-CM

## 2018-05-30 DIAGNOSIS — Z13.31 SCREENING FOR DEPRESSION: ICD-10-CM

## 2018-05-30 DIAGNOSIS — Z00.00 MEDICARE ANNUAL WELLNESS VISIT, INITIAL: Primary | ICD-10-CM

## 2018-05-30 PROBLEM — E11.3599 TYPE 2 DIABETES MELLITUS WITH PROLIFERATIVE RETINOPATHY (HCC): Status: RESOLVED | Noted: 2018-05-30 | Resolved: 2018-05-30

## 2018-05-30 PROBLEM — E11.21 TYPE 2 DIABETES WITH NEPHROPATHY (HCC): Status: ACTIVE | Noted: 2018-05-30

## 2018-05-30 PROBLEM — E11.3599 TYPE 2 DIABETES MELLITUS WITH PROLIFERATIVE RETINOPATHY (HCC): Status: RESOLVED | Noted: 2018-04-11 | Resolved: 2018-05-30

## 2018-05-30 PROBLEM — E11.319 TYPE 2 DIABETES MELLITUS WITH DIABETIC RETINOPATHY (HCC): Status: RESOLVED | Noted: 2018-01-30 | Resolved: 2018-05-30

## 2018-05-30 PROBLEM — E11.21 TYPE 2 DIABETES WITH NEPHROPATHY (HCC): Status: RESOLVED | Noted: 2018-05-30 | Resolved: 2018-05-30

## 2018-05-30 PROBLEM — E87.5 HYPERKALEMIA: Status: RESOLVED | Noted: 2018-01-30 | Resolved: 2018-05-30

## 2018-05-30 PROBLEM — E11.21 TYPE 2 DIABETES MELLITUS WITH NEPHROPATHY (HCC): Status: RESOLVED | Noted: 2018-01-30 | Resolved: 2018-05-30

## 2018-05-30 PROBLEM — E11.3599 TYPE 2 DIABETES MELLITUS WITH PROLIFERATIVE RETINOPATHY (HCC): Status: ACTIVE | Noted: 2018-05-30

## 2018-05-30 RX ORDER — MIRTAZAPINE 15 MG/1
15 TABLET, FILM COATED ORAL
Qty: 30 TAB | Refills: 0 | Status: SHIPPED | OUTPATIENT
Start: 2018-05-30 | End: 2018-06-26 | Stop reason: SDUPTHER

## 2018-05-30 RX ORDER — SPIRONOLACTONE 50 MG/1
50 TABLET, FILM COATED ORAL DAILY
Qty: 30 TAB | Refills: 1 | Status: SHIPPED | OUTPATIENT
Start: 2018-05-30 | End: 2018-06-26 | Stop reason: SDUPTHER

## 2018-05-30 NOTE — MR AVS SNAPSHOT
[facility] 
 
 
 [address] [place] [address] [facility] 
[phone] Patient: Fabian Goodrich MRN:  [id number] Visit Information Date & Time Provider Department Dept. Phone Encounter #  
 5/30/2018  9:30 AM Sharon Augustine MD [facility] [id number] Follow-up Instructions Return in about 4 weeks (around 6/27/2018). Your Appointments 6/26/2018 11:30 AM  
ACUTE CARE with Sharon Augustine MD  
[facility] [place] [id number] [facility]) Appt Note: 2 month fu  
 [address] [place] [address]  
[phone]  
  
   
 [address] [facility] [address] Upcoming Health Maintenance Date Due DTaP/Tdap/Td series (1 - Tdap) 7/20/1952 FOOT EXAM Q1 9/14/2016 LIPID PANEL Q1 3/11/2017 MEDICARE YEARLY EXAM 3/14/2018 Bone Densitometry (Dexa) Screening 6/9/2021* Influenza Age 5 to Adult 8/1/2018 HEMOGLOBIN A1C Q6M 8/8/2018 EYE EXAM RETINAL OR DILATED Q1 12/15/2018 MICROALBUMIN Q1 2/8/2019 GLAUCOMA SCREENING Q2Y 12/15/2019 *Topic was postponed. The date shown is not the original due date. Allergies as of 5/30/2018  Review Complete On: 5/30/2018 By: Sharon Augustine MD  
  
 Severity Noted Reaction Type Reactions Meperidine Medium 03/11/2016    Hives Other reaction(s): Hives Ace Inhibitors  05/14/2010    Unknown (comments) Aldactone [Spironolactone]  01/30/2018    Other (comments)  
 hyperkalemia Codeine  05/14/2010    Unknown (comments) Miralax [Polyethylene Glycol 3350]  05/14/2010    Nausea and Vomiting Other Plant, Animal, Environmental  07/14/2017    Rash Bilateral Hearing Aids cause severe reaction per pt report Percocet [Oxycodone-acetaminophen]  05/14/2010    Unknown (comments) Statins-hmg-coa Reductase Inhibitors  05/14/2010    Unknown (comments) Sular [Nisoldipine]  05/14/2010    Rash Zetia [Ezetimibe]  05/14/2010    Unknown (comments) Current Immunizations  Reviewed on 10/23/2017 Name Date Influenza High Dose Vaccine PF 10/23/2017, 10/27/2016 Influenza Vaccine 9/25/2013 Influenza Vaccine Split 9/19/2012, 11/17/2011, 11/10/2010 Pneumococcal Conjugate (PCV-13) 10/27/2016 ZZZ-RETIRED (DO NOT USE) Pneumococcal Vaccine (Unspecified Type) 5/14/2001 Not reviewed this visit Vitals BP Pulse Temp Resp Height(growth percentile) Weight(growth percentile) 158/90 (BP 1 Location: Left arm, BP Patient Position: Sitting) 72 98.9 °F (37.2 °C) (Oral) 18 5' 1\" (1.549 m) 169 lb 6 oz (76.8 kg) SpO2 BMI OB Status Smoking Status 96% 32 kg/m2 Hysterectomy Never Smoker Vitals History BMI and BSA Data Body Mass Index Body Surface Area 32 kg/m 2 1.82 m 2 Preferred Pharmacy Pharmacy Name Phone [phone] Your Updated Medication List  
  
   
This list is accurate as of 5/30/18 10:12 AM.  Always use your most recent med list.  
  
  
  
  
 bumetanide 0.5 mg tablet Commonly known as:  [place] Take 1 Tab by mouth daily. clobetasol 0.05 % topical foam  
Commonly known as:  OLUX  
APPLY 1 APPLICATION TO AFFECTED AREA TWICE A DAY EXTERNALLY TO EARS 7 DAYS, AS NEEDED FOR FLARES  
  
 metoprolol succinate 100 mg tablet Commonly known as:  TOPROL-XL Take  by mouth daily. mirtazapine 15 mg tablet Commonly known as:  Izabel Sean Take 1 Tab by mouth nightly. REFRESH OP Apply  to eye daily. spironolactone 50 mg tablet Commonly known as:  ALDACTONE Take 1 Tab by mouth daily. XALATAN 0.005 % ophthalmic solution Generic drug:  latanoprost  
Administer 1 Drop to both eyes nightly. Prescriptions Sent to Pharmacy  Refills  
 mirtazapine (REMERON) 15 mg tablet 0  
 Sig: Take 1 Tab by mouth nightly. Class: Normal  
 Pharmacy: [facility]/pharmacy [address], [address] [place] [address] Ph #: [phone] Route: Oral  
 spironolactone (ALDACTONE) 50 mg tablet 1 Sig: Take 1 Tab by mouth daily. Class: Normal  
 Pharmacy: [facility]/pharmacy [address], [address] [place] [address] Ph #: [phone] Route: Oral  
  
Follow-up Instructions Return in about 4 weeks (around 6/27/2018). To-Do List   
 06/04/2018 11:00 AM  
  Appointment with Brittnee Sullivan at [facility] [address] ([phone]) Please remember to arrive at the hospital at least 30 minutes prior to your scheduled appointment time. When you come in for your appointment, please be sure to bring the therapy prescription the doctor gave you, your insurance card, and a list of the medicines you are taking. Also, please remember to wear comfortable, loose- fitting clothes. We look forward to seeing you. 06/08/2018 11:00 AM  
  Appointment with Brittnee Sullivan at [facility] [address] ([phone]) Please remember to arrive at the hospital at least 30 minutes prior to your scheduled appointment time. When you come in for your appointment, please be sure to bring the therapy prescription the doctor gave you, your insurance card, and a list of the medicines you are taking. Also, please remember to wear comfortable, loose- fitting clothes. We look forward to seeing you.  
  
 06/11/2018 11:30 AM  
  Appointment with Brittnee Sullivan at [facility] [address] ([phone]) Please remember to arrive at the hospital at least 30 minutes prior to your scheduled appointment time. When you come in for your appointment, please be sure to bring the therapy prescription the doctor gave you, your insurance card, and a list of the medicines you are taking. Also, please remember to wear comfortable, loose- fitting clothes.   We look forward to seeing you.  
  
 06/15/2018 11:30 AM  
 Appointment with Shabnam Glover at [facility] [address] ([phone]) Please remember to arrive at the hospital at least 30 minutes prior to your scheduled appointment time. When you come in for your appointment, please be sure to bring the therapy prescription the doctor gave you, your insurance card, and a list of the medicines you are taking. Also, please remember to wear comfortable, loose- fitting clothes. We look forward to seeing you. Introducing [facility] & HEALTH SERVICES! [facility] introduces [employer] patient portal. Now you can access parts of your medical record, email your doctor's office, and request medication refills online. 1. In your internet browser, go to [email or web address] 2. Click on the First Time User? Click Here link in the Sign In box. You will see the New Member Sign Up page. 3. Enter your Vid[employer] Access Code exactly as it appears below. You will not need to use this code after youve completed the sign-up process. If you do not sign up before the expiration date, you must request a new code. · VidSys Access Code: [email or web address] Expires: 7/10/2018  9:42 AM 
 
4. Enter the last four digits of your Social Security Number (xxxx) and Date of Birth (mm/dd/yyyy) as indicated and click Submit. You will be taken to the next sign-up page. 5. Create a VidSys ID. This will be your VidSys login ID and cannot be changed, so think of one that is secure and easy to remember. 6. Create a V[employer]Sys password. You can change your password at any time. 7. Enter your Password Reset Question and Answer. This can be used at a later time if you forget your password. 8. Enter your e-mail address. You will receive e-mail notification when new information is available in [address] 9. Click Sign Up. You can now view and download portions of your medical record. 10. Click the Download Summary menu link to download a portable copy of your medical information. If you have questions, please visit the Frequently Asked Questions section of the [employer] website. Remember, [employer] is NOT to be used for urgent needs. For medical emergencies, dial 911. Now available from your iPhone and Android! Please provide this summary of care documentation to your next provider. Your primary care clinician is listed as Christoph Kumar. If you have any questions after today's visit, please call [phone].

## 2018-05-30 NOTE — PATIENT INSTRUCTIONS
Medicare Part B Preventive Services Guidelines/Limitations Date last completed and Frequency Due Date   Bone Mass Measurement  (age 72 & older, biennial) Requires diagnosis related to osteoporosis or estrogen deficiency. Biennial benefit unless patient has history of long-term glucocorticoid tx or baseline is needed because initial test was by other method As recommended by your PCP or Specialist    Recommended every 2 years As recommended by your PCP or Specialist     Cardiovascular Screening Blood Tests (every 5 years)  Total cholesterol, HDL, Triglycerides Order as a panel if possible Completed 3/2016    As recommended by your PCP As recommended by your PCP or Specialist   Colorectal Cancer Screening  -Fecal occult blood test (annual)  -Flexible sigmoidoscopy (5y)  -Screening colonoscopy (10y)  -Barium Enema Age 49-80; After age [de-identified] if history of abnormal results As recommended by your PCP or Specialist     Recommended every 5 to 10 years  As recommended by your PCP or Specialist     Counseling to Prevent Tobacco Use (up to 8 sessions per year)  - Counseling greater than 3 and up to 10 minutes  - Counseling greater than 10 minutes Patients must be asymptomatic of tobacco-related conditions to receive as preventive service N/A N/A   Diabetes Screening Tests (at least every 3 years, Medicare covers annually or at 6-month intervals for prediabetic patients)    Fasting blood sugar (FBS) or glucose tolerance test (GTT) Patient must be diagnosed with one of the following:  -Hypertension, Dyslipidemia, obesity, previous impaired FBS or GTT  Or any two of the following: overweight, FH of diabetes, age ? 72, history of gestational diabetes, birth of baby weighing more than 9 pounds Completed 4/2018    Recommended every 3 years for non-diabetics     As recommended by your PCP or Specialist     Glaucoma Screening (no USPSTF recommendation) Diabetes mellitus, family history, , age 48 or over,  American, age 72 or over Completed 12/2017    Recommended annually As recommended by your PCP or Specialist   Seasonal Influenza Vaccination (annually)  Completed 10/2017    Recommended Annually Due Fall 2018   TDAP Vaccination  As recommended by your PCP or Specialist    Recommended every 10 years As recommended by your PCP or Specialist   Zoster (Shingles) Vaccination Covered by Medicare Part D through the pharmacy- PCP provides prescription Never received    Recommended once over age 48  As recommended by your PCP or Specialist   Pneumococcal Vaccination (once after 72)  Pneumo 23- 5/2001  Recommended once over the age of 72    Prevnar 15- 10/2016 Recommended once over the age of 72 Complete        Complete   Screening Mammography (biennial age 54-69) Annually (age 36 or over) As recommended by your PCP or Specialist   As recommended by your PCP or Specialist     Screening Pap Tests and Pelvic Examination (up to age 79 and after 79 if unknown history or abnormal study last [address] years) Every 25 months except high risk As recommended by your PCP or Specialist   As recommended by your PCP or Specialist     Ultrasound Screening for Abdominal Aortic Aneurysm (AAA) (once) Patient must be referred through IPPE and not have had a screening for abdominal aortic aneurysm before under Medicare. Limited to patients who meet one of the following criteria:  - Men who are 73-68 years old and have smoked more than 100 cigarettes in their lifetime.  -Anyone with a FH of AAA  -Anyone recommended for screening by USPSTF Not indicated unless recommended by PCP   Not indicated unless recommended by PCP     Family Practice Management 2011    Please bring a copy of your completed advance medical directive to the office so it may be added to your medical record. Thank you. If you have any questions or concerns please feel free to contact me at [phone].   It was a pleasure meeting you today and participating in your healthcare. Adán Linn RN        Medicare Wellness Visit, Female    The best way to live healthy is to have a lifestyle where you eat a well-balanced diet, exercise regularly, limit alcohol use, and quit all forms of tobacco/nicotine, if applicable. Regular preventive services are another way to keep healthy. Preventive services (vaccines, screening tests, monitoring & exams) can help personalize your care plan, which helps you manage your own care. Screening tests can find health problems at the earliest stages, when they are easiest to treat. [address] [facility] follows the current, evidence-based guidelines published by the [facility] States [place] ([facility]) when recommending preventive services for our patients. Because we follow these guidelines, sometimes recommendations change over time as research supports it. (For example, mammograms used to be recommended annually. Even though Medicare will still pay for an annual mammogram, the newer guidelines recommend a mammogram every two years for women of average risk.)    Of course, you and your provider may decide to screen more often for some diseases, based on your risk and co-morbidities (chronic disease you are already diagnosed with). Preventive services for you include:    - Medicare offers their members a free annual wellness visit, which is time for you and your primary care provider to discuss and plan for your preventive service needs. Take advantage of this benefit every year!    -All people over age 72 should receive the recommended pneumonia vaccines. Current USPSTF guidelines recommend a series of two vaccines for the best pneumonia protection.     -All adults should have a yearly flu vaccine and a tetanus vaccine every 10 years. All adults age 61 years should receive a shingles vaccine once in their lifetime.      -A bone mass density test is recommended when a woman turns 65 to screen for osteoporosis. This test is only recommended once as a screening. Some providers will use this same test as a disease monitoring tool if you already have osteoporosis. -All adults age 38-68 years who are overweight should have a diabetes screening test once every three years.     -Other screening tests & preventive services for persons with diabetes include: an eye exam to screen for diabetic retinopathy, a kidney function test, a foot exam, and stricter control over your cholesterol.     -Cardiovascular screening for adults with routine risk involves an electrocardiogram (ECG) at intervals determined by the provider.     -Colorectal cancer screenings should be done for adults age 54-65 years with normal risk. There are a number of acceptable methods of screening for this type of cancer. Each test has its own benefits and drawbacks. Discuss with your provider what is most appropriate for you during your annual wellness visit. The different tests include: colonoscopy (considered the best screening method), a fecal occult blood test, a fecal DNA test, and sigmoidoscopy. -Breast cancer screenings are recommended every other year for women of normal risk age 54-69 years.     -Cervical cancer screenings for women over age 72 are only recommended with certain risk factors.     -All adults born between [facility] should be screened once for Hepatitis C.      Here is a list of your current Health Maintenance items (your personalized list of preventive services) with a due date:  Health Maintenance Due   Topic Date Due    DTaP/Tdap/Td  (1 - Tdap) 07/20/1952

## 2018-05-30 NOTE — PROGRESS NOTES
HISTORY OF PRESENT ILLNESS  Amira Em is a 80 y.o. female. HPI  Hypertension:  Amira Em is a 80 y.o. female with hypertension. with Chronic kidney disease stage 3   Medication change since last visit: No but no meds today  The patient reports:  taking medications as instructed, no medication side effects noted, home BP monitoring in range of 845'D systolic over 57'U diastolic, no TIA's, no chest pain on exertion, no dyspnea on exertion, no swelling of ankles, no orthostatic dizziness or lightheadedness, no palpitations. Lifestyle modification/social history: generally follows a low fat low cholesterol diet, generally follows a low sodium diet, exercises regularly, nonsmoker    Lab Results   Component Value Date/Time    Sodium 144 04/25/2018 12:00 AM    Potassium 4.9 04/25/2018 12:00 AM    Chloride 104 04/25/2018 12:00 AM    CO2 22 04/25/2018 12:00 AM    Anion gap 2 (L) 08/01/2017 12:10 AM    Glucose 76 04/25/2018 12:00 AM    BUN 16 04/25/2018 12:00 AM    Creatinine 0.90 04/25/2018 12:00 AM    BUN/Creatinine ratio 18 04/25/2018 12:00 AM    GFR est AA 67 04/25/2018 12:00 AM    GFR est non-AA 58 (L) 04/25/2018 12:00 AM    Calcium 9.3 04/25/2018 12:00 AM       She reports improved gait stability after PT. No falls or near falls. Insomnia continues with no relief from melatonin. She continues to wake around 4 am and cannot resume sleep.       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.3    Diabetes mellitus without complication ([facility] [address] C64.9    Essential hypertension I10    Depression F32.9    Gallstones K80.20    S/P laparoscopic cholecystectomy Z90.49    Psychophysiological insomnia F51.04    Hyperkalemia E87.5    Type 2 diabetes with nephropathy ([facility]) E11.21    Type 2 diabetes mellitus with proliferative retinopathy ([facility] [address] B82.1411     Past Medical History:   Diagnosis Date    Anemia     Bradycardia     Depression     \"better since moving into my daughters mother-in-law suite\"    DJD (degenerative joint disease), lumbar     GERD (gastroesophageal reflux disease)     stricture.  Glaucoma     Glucose intolerance     [place] (hard of hearing)     HTN (hypertension)     Keratoconjunctivitis     sicca    Macular degeneration     macular degeneration vs ooptic neuropathy    ABAD (obstructive sleep apnea)     decided not to use d/t frequent bathroom trips at night. didn't help    Osteopenia     Otitis media     chronic    Stroke [facility]) 2011    Mini stroke \"in the past, affected my eyes\" Need glasses for reading    Venous insufficiency 10/10/2011     Allergies   Allergen Reactions    Meperidine Hives     Other reaction(s): Hives    Ace Inhibitors Unknown (comments)    Aldactone [Spironolactone] Other (comments)     hyperkalemia    Codeine Unknown (comments)    Miralax [Polyethylene Glycol 3350] Nausea and Vomiting    Other Plant, Animal, Environmental Rash     Bilateral Hearing Aids cause severe reaction per pt report    Percocet [Oxycodone-Acetaminophen] Unknown (comments)    Statins-Hmg-Coa Reductase Inhibitors Unknown (comments)    Sular [Nisoldipine] Rash    Zetia [Ezetimibe] Unknown (comments)     Current Outpatient Prescriptions on File Prior to Visit   Medication Sig Dispense Refill    CARBOXYMETHYLCELLULOSE SODIUM (REFRESH OP) Apply  to eye daily.  bumetanide (BUMEX) 0.5 mg tablet Take 1 Tab by mouth daily. (Patient taking differently: Take 0.5 mg by mouth daily.  1/2 Tablet every other day) 90 Tab 1    clobetasol (OLUX) 0.05 % topical foam APPLY 1 APPLICATION TO AFFECTED AREA TWICE A DAY EXTERNALLY TO EARS 7 DAYS, AS NEEDED FOR FLARES  2    latanoprost (XALATAN) 0.005 % ophthalmic solution Administer 1 Drop to both eyes nightly.  metoprolol succinate (TOPROL-XL) 100 mg tablet Take  by mouth daily. No current facility-administered medications on file prior to visit. Social History   Substance Use Topics    Smoking status: Never Smoker    Smokeless tobacco: Never Used    Alcohol use No           ROS    Physical Exam   Psychiatric: She has a normal mood and affect. Her speech is normal and behavior is normal. Judgment and thought content normal. Cognition and memory are normal.     Visit Vitals    /90 (BP 1 Location: Left arm, BP Patient Position: Sitting)    Pulse 72    Temp 98.9 °F (37.2 °C) (Oral)    Resp 18    Ht 5' 1\" (1.549 m)    Wt 169 lb 6 oz (76.8 kg)    SpO2 96%    BMI 32 kg/m2       ASSESSMENT and PLAN  Diagnoses and all orders for this visit:    Medicare wellness. Dianelys Enriquez received a complete medicare wellness assessment today by Seamus Velarde RN. I've reviewed her assessment note and all screening/preventative plans addressed. I also addressed all questions and concerns surrounding the assessment and plans with Dianelys Enriquez. Pt declines BMD.    1. Essential hypertension - not to goal even on meds. Increase spironolactone. Recheck K next visit. -     spironolactone (ALDACTONE) 50 mg tablet; Take 1 Tab by mouth daily. 2. Psychophysiological insomnia - change to remeron. Dianelys Enriquez was counseled on this new medication prescribed. Adverse effects, risks and monitoring of medication along with potential benefits of medication were discussed. All of her questions about the medication were answered. -     mirtazapine (REMERON) 15 mg tablet; Take 1 Tab by mouth nightly. Follow-up Disposition:  Return in about 4 weeks (around 6/27/2018).

## 2018-05-30 NOTE — PROGRESS NOTES
Nurse Navigator Medicare Wellness Visit performed by HAKEEM Adhikari    This is an Initial Everardo Exam (AWV) (Performed 12 months after IPPE or effective date of Medicare Part B enrollment, Once in a lifetime)    I have reviewed the patient's medical history in detail and updated the computerized patient record. History     Past Medical History:   Diagnosis Date    Anemia     Bradycardia     Depression     \"better since moving into my daughters mother-in-law suite\"    DJD (degenerative joint disease), lumbar     GERD (gastroesophageal reflux disease)     stricture.  Glaucoma     Glucose intolerance     Andreafski (hard of hearing)     HTN (hypertension)     Keratoconjunctivitis     sicca    Macular degeneration     macular degeneration vs ooptic neuropathy    ABAD (obstructive sleep apnea)     decided not to use d/t frequent bathroom trips at night. didn't help    Osteopenia     Otitis media     chronic    Stroke [facility]) 2011    Mini stroke \"in the past, affected my eyes\" Need glasses for reading    Venous insufficiency 10/10/2011      Past Surgical History:   Procedure Laterality Date    BREAST SURGERY PROCEDURE UNLISTED  30 yrs ago    Breast Lumpectomy (side?)    EGD  2004    HX CHOLECYSTECTOMY  07/2018    HX COLONOSCOPY  2004    HX ENDOSCOPY  2004    HX HYSTERECTOMY      HX ORTHOPAEDIC  2005    cyst/tumor left hip    HX ORTHOPAEDIC  2014    Right Total Hip    Dr. Kirt AVILA    HX PACEMAKER  11/4/2011    Biotluisa ZAMORA    HX PACEMAKER      STRESS TEST LEXISCAN/CARDIOLITE  10/3/11    normal perfusion, EF 72%     Current Outpatient Prescriptions   Medication Sig Dispense Refill    mirtazapine (REMERON) 15 mg tablet Take 1 Tab by mouth nightly. 30 Tab 0    spironolactone (ALDACTONE) 50 mg tablet Take 1 Tab by mouth daily. 30 Tab 1    CARBOXYMETHYLCELLULOSE SODIUM (REFRESH OP) Apply  to eye daily.  bumetanide (BUMEX) 0.5 mg tablet Take 1 Tab by mouth daily. (Patient taking differently: Take 0.5 mg by mouth daily. 1/2 Tablet every other day) 90 Tab 1    clobetasol (OLUX) 0.05 % topical foam APPLY 1 APPLICATION TO AFFECTED AREA TWICE A DAY EXTERNALLY TO EARS 7 DAYS, AS NEEDED FOR FLARES  2    latanoprost (XALATAN) 0.005 % ophthalmic solution Administer 1 Drop to both eyes nightly.  metoprolol succinate (TOPROL-XL) 100 mg tablet Take  by mouth daily.        Allergies   Allergen Reactions    Meperidine Hives     Other reaction(s): Hives    Ace Inhibitors Unknown (comments)    Aldactone [Spironolactone] Other (comments)     hyperkalemia    Codeine Unknown (comments)    Miralax [Polyethylene Glycol 3350] Nausea and Vomiting    Other Plant, Animal, Environmental Rash     Bilateral Hearing Aids cause severe reaction per pt report    Percocet [Oxycodone-Acetaminophen] Unknown (comments)    Statins-Hmg-Coa Reductase Inhibitors Unknown (comments)    Sular [Nisoldipine] Rash    Zetia [Ezetimibe] Unknown (comments)     Family History   Problem Relation Age of Onset    Heart Failure Mother     Heart Disease Father     Stroke Brother      Social History   Substance Use Topics    Smoking status: Never Smoker    Smokeless tobacco: Never Used    Alcohol use No     Patient Active Problem List   Diagnosis Code    ABAD (obstructive sleep apnea) G47.33    Palpitation R00.2    Venous insufficiency I87.2    Sinus node dysfunction (HCC) I49.5    Iron deficiency anemia D50.9    GERD (gastroesophageal reflux disease) K21.9    Esophageal stricture K22.2    Osteopenia M85.80    DJD (degenerative joint disease), lumbar M47.816    Macular degeneration G25.30    Diastolic dysfunction T18.1    Chronic cough R05    Psychotic depression (HCC) F32.3    Essential hypertension I10    Depression F32.9    Gallstones K80.20    S/P laparoscopic cholecystectomy Z90.49    Psychophysiological insomnia F51.04    Advanced care planning/counseling discussion Z71.89 Depression Risk Factor Screening:   Patient denies feelings of being down, depressed or hopeless at this time. Patient states that they have a strong support system within their family & friends. PHQ over the last two weeks 5/30/2018   Little interest or pleasure in doing things Not at all   Feeling down, depressed or hopeless Not at all   Total Score PHQ 2 0     Alcohol Risk Factor Screening: You do not drink alcohol or very rarely. Functional Ability and Level of Safety:     Hearing Loss  Patient admits that she is hard of hearing in both ears. Patient shares that she has been evaluated by ENT/ Audiology, in fact, she owns two pairs of hearing aids, but she is unable to wear the hearing aids b/c she is allergic to the inner-ear piece. Patient states that when she wears the hearing aids (either pair), he ear begins to break-out in a rash & the rash moved down her neck. Patient adds that there is no rash present is she is not wearing her hearing aids. Patient verbalized awareness that she is in need of further evaluation, but she already has $7000.00 invested in the hearing aids that she has & she cannot afford any additional cost at this time. Patient denies issues with her ears & states that she just asks people to stand closer & speak up. Activities of Daily Living  The home contains: grab bars  Patient does total self care   Patient states that she lives in a private residence alone, but her daughter & son-in-law have recently moved to a house 5 miles away & this has been very helpful & patient is very pleased. Patient states that she has two daughters. Patient denies the use of assistive devices for ambulation. Patient states that she is as independent as she can be in her ADL's, but her daughter will drive if her errands or doctor appointments are far away. Patient states that she has macular degeneration, so she appreciates with her daughter can drive.  Patient shares that she attempts to stay active, but she has been feeling fatigued recently due to sever insomnia which she will discuss in more detail with PCP today. Patient shares that she has been participating in outpatient physical therapy for leg weakness (5 weeks complete & 2 more weeks to go) & the therapy has been extremely helpful. ADL Assessment 5/30/2018   Feeding yourself No Help Needed   Getting from bed to chair No Help Needed   Getting dressed No Help Needed   Bathing or showering No Help Needed   Walk across the room (includes cane/walker) No Help Needed   Using the telphone No Help Needed   Taking your medications No Help Needed   Preparing meals No Help Needed   Managing money (expenses/bills) No Help Needed   Moderately strenuous housework (laundry) No Help Needed   Shopping for personal items (toiletries/medicines) No Help Needed   Shopping for groceries No Help Needed   Driving No Help Needed   Climbing a flight of stairs No Help Needed   Getting to places beyond walking distances No Help Needed       Patient denies falls within the past year & verbalizes awareness of fall prevention strategies. Fall Risk  Fall Risk Assessment, last 12 mths 5/30/2018   Able to walk? Yes   Fall in past 12 months?  No   Fall with injury? -   Number of falls in past 12 months -   Fall Risk Score -       Abuse Screen  Patient is not abused    Cognitive Screening   Evaluation of Cognitive Function:  Has your family/caregiver stated any concerns about your memory: no      Patient Care Team   Patient Care Team:  Shira Bey MD as PCP - General (Internal Medicine)  Belen Chow MD (Cardiology)  Balwinder Lopez RN as Ambulatory Care Navigator  Tawny Terrell MD (Pulmonary Disease)  Camron Pat MD (Gastroenterology)  Jennie Teague MD (Cardiology)  Yemi Mc MD (Hematology and Oncology)  Kelly Wyatt MD (General Surgery)    Assessment/Plan   Education and counseling provided:  Are appropriate based on today's review and evaluation  End-of-Life planning (with patient's consent)  Pneumococcal Vaccine  Influenza Vaccine  Screening Mammography  Colorectal cancer screening tests  Bone mass measurement (DEXA)  Screening for glaucoma  tdap & shingles vaccinations    Diagnoses and all orders for this visit:    1. Medicare annual wellness visit, initial    2. Psychophysiological insomnia  -     mirtazapine (REMERON) 15 mg tablet; Take 1 Tab by mouth nightly. 3. Essential hypertension  -     spironolactone (ALDACTONE) 50 mg tablet; Take 1 Tab by mouth daily. 4. Advanced care planning/counseling discussion    AWV Summary:    1. Patient states that a completed Advanced Medical Directive is at home. NN encouraged patient to bring a copy of the completed Advanced Medical Directive to the office for scanning into the medical record. Patient verbalized understanding & agreement. 2. Patient is up to date on the following immunizations:  Immunization History   Administered Date(s) Administered    Influenza High Dose Vaccine PF 10/27/2016, 10/23/2017    Influenza Vaccine 09/25/2013    Influenza Vaccine Split 11/10/2010, 11/17/2011, 09/19/2012    Pneumococcal Conjugate (PCV-13) 10/27/2016    ZZZ-RETIRED (DO NOT USE) Pneumococcal Vaccine (Unspecified Type) 05/14/2001   Patient confirmed the aforementioned preventative immunization dates are correct. Patient is unable to recall the date of their last tdap vaccine. NN encouraged patient to check home records & if information obtained, to please notify PCP's office with the details. Patient verbalized agreement. Patient denies receiving a shingles vaccine in the past & patient denies ever having a case of shingles in the past. Patient's health maintenance immunization record has been updated & is current.      3. Due to the patient's age, screening mammograms & screening colonoscopies are no longer indicated unless recommended by PCP or a specialist. Patient requests to postpone order for screening dexa scan until she is feeling better. PCP notified. 4. Patient was not wearing corrective lenses. Patient reports having a routine eye exam & glaucoma screening within the last year performed by Dr. Marlene Regalado at the [facility]. An eye exam report dated 12/2017 is on file in the patient's medical record. Patient reports that she has an upcoming appointment in the next few months. Patient adds that she goes every 6 months to see the ophthalmologist as she has been diagnosed with macular degeneration. Patient verbalized understanding of all information discussed. Patient was given the opportunity to ask questions. Medication reconciliation completed by MA/ LPN and reviewed by PCP. Patient provided AVS, either a printed version or electronic version in [employer], which includes Medicare Wellness Preventative Screening Table.        Health Maintenance Due   Topic Date Due    DTaP/Tdap/Td series (1 - Tdap) 07/20/1952

## 2018-06-04 ENCOUNTER — HOSPITAL ENCOUNTER (OUTPATIENT)
Dept: PHYSICAL THERAPY | Age: 83
Discharge: HOME | End: 2018-06-04
Payer: MEDICARE

## 2018-06-04 PROCEDURE — 97112 NEUROMUSCULAR REEDUCATION: CPT

## 2018-06-04 PROCEDURE — 97110 THERAPEUTIC EXERCISES: CPT

## 2018-06-04 NOTE — PROGRESS NOTES
PT DAILY TREATMENT NOTE - [facility] 2-15    Patient Name: Anthony Laser  Date:2018  : 1931  [x]  Patient  Verified  Payor: [place] MEDICARE / Plan: [place] MEDICARE PART A & B / Product Type: Medicare /    In time: 11:00a  Out time:11:55a  Total Treatment Time (min): 55  Total Timed Codes (min): 55  1:1 Treatment Time ([facility] only): 54   Visit #: 12     Treatment Area: Gait instability [R26.81]    SUBJECTIVE  Pain Level (0-10 scale): 0/10   Any medication changes, allergies to medications, adverse drug reactions, diagnosis change, or new procedure performed?: [x] No    [] Yes (see summary sheet for update)  Subjective functional status/changes:   [] No changes reported  Patient reports she saw her MD on Wednesday and he has given her a new sleeping medication. Patient reports she is feeling better, just a little tired today. Patient states she is on hold for cardiac surgery until her BP is under better control. OBJECTIVE    40 min Therapeutic Exercise:  [x] See flow sheet :   Rationale: increase ROM, increase strength, improve coordination, improve balance and increase proprioception to improve the patients ability to navigate stairs, ambulate on uneven surfaces, and transfer without risk of falling. 15 min Neuromuscular Re-education:  [x]  See flow sheet :   Rationale: increase strength, improve coordination and improve balance  to improve the patients ability to sit, stand, reach, ambulate, lift, carry and complete ADL's.      With   [] TE   [] TA   [] neuro   [] other: Patient Education: [x] Review HEP    [] Progressed/Changed HEP based on:   [] positioning   [] body mechanics   [] transfers   [] heat/ice application    [] other:      Other Objective/Functional Measures:   BP: pre-treatment: 150/80  Pt with increased fatigue requiring several rest breaks.   Pt able to advance neuro re-education exercises with min increased sway       Pain Level (0-10 scale) post treatment: 0     ASSESSMENT/Changes in Function:     Patient will continue to benefit from skilled PT services to modify and progress therapeutic interventions, address functional mobility deficits, address ROM deficits, address strength deficits, analyze and address soft tissue restrictions, analyze and cue movement patterns, analyze and modify body mechanics/ergonomics and assess and modify postural abnormalities to attain remaining goals. []  See Plan of Care  []  See progress note/recertification  []  See Discharge Summary         Progress towards goals / Updated goals:  Patient demonstrates good tolerance for interventions with verbal cues required for postural awareness.      PLAN  []  Upgrade activities as tolerated     [x]  Continue plan of care  []  Update interventions per flow sheet       []  Discharge due to:_  []  Other:_          Levon Mahan 6/4/2018  12:22 PM

## 2018-06-08 ENCOUNTER — APPOINTMENT (OUTPATIENT)
Dept: PHYSICAL THERAPY | Age: 83
End: 2018-06-08
Payer: MEDICARE

## 2018-06-11 ENCOUNTER — HOSPITAL ENCOUNTER (OUTPATIENT)
Dept: PHYSICAL THERAPY | Age: 83
Discharge: HOME | End: 2018-06-11
Payer: MEDICARE

## 2018-06-11 PROCEDURE — 97110 THERAPEUTIC EXERCISES: CPT

## 2018-06-11 PROCEDURE — 97112 NEUROMUSCULAR REEDUCATION: CPT

## 2018-06-11 NOTE — PROGRESS NOTES
PT DAILY TREATMENT NOTE - [facility] 2-15    Patient Name: Sue Dewey  Date:2018  : 1931  [x]  Patient  Verified  Payor: Mario Bernal / Plan: [place] MEDICARE PART A & B / Product Type: Medicare /    In time: 11:30a  Out time:12:25p  Total Treatment Time (min): 55  Total Timed Codes (min): 55  1:1 Treatment Time ([address] only): 25   Visit #: 12     Treatment Area: Gait instability [R26.81]    SUBJECTIVE  Pain Level (0-10 scale): 0/10   Any medication changes, allergies to medications, adverse drug reactions, diagnosis change, or new procedure performed?: [x] No    [] Yes (see summary sheet for update)  Subjective functional status/changes:   [] No changes reported  Patient reports fall on Thursday where she got her foot caught on between the sidewalk and gravel unloading her car when she fell. OBJECTIVE    40 min Therapeutic Exercise:  [x] See flow sheet :   Rationale: increase ROM, increase strength, improve coordination, improve balance and increase proprioception to improve the patients ability to navigate stairs, ambulate on uneven surfaces, and transfer without risk of falling.     15 min Neuromuscular Re-education:  [x]  See flow sheet :   Rationale: increase strength, improve coordination and improve balance  to improve the patients ability to sit, stand, reach, ambulate, lift, carry and complete ADL's.      With   [] TE   [] TA   [] neuro   [] other: Patient Education: [x] Review HEP    [] Progressed/Changed HEP based on:   [] positioning   [] body mechanics   [] transfers   [] heat/ice application    [] other:      Other Objective/Functional Measures:   BP: pre-treatment: 152/80  Pt able to tolerate seated interventions well, did not progress due to recent fall and soreness,        Pain Level (0-10 scale) post treatment: 0     ASSESSMENT/Changes in Function:     Patient will continue to benefit from skilled PT services to modify and progress therapeutic interventions, address functional mobility deficits, address ROM deficits, address strength deficits, analyze and address soft tissue restrictions, analyze and cue movement patterns, analyze and modify body mechanics/ergonomics and assess and modify postural abnormalities to attain remaining goals. []  See Plan of Care  []  See progress note/recertification  []  See Discharge Summary         Progress towards goals / Updated goals: Patient demonstrates good tolerance with interventions and is able to tolerate seated exercises. Patient will do well with continued progression of neuro re-education exercises.      PLAN  []  Upgrade activities as tolerated     [x]  Continue plan of care  []  Update interventions per flow sheet       []  Discharge due to:_  []  Other:_          Samanta Rosario 6/11/2018  12:22 PM

## 2018-06-15 ENCOUNTER — HOSPITAL ENCOUNTER (OUTPATIENT)
Dept: PHYSICAL THERAPY | Age: 83
Discharge: HOME | End: 2018-06-15
Payer: MEDICARE

## 2018-06-15 PROCEDURE — 97110 THERAPEUTIC EXERCISES: CPT

## 2018-06-15 NOTE — PROGRESS NOTES
PT DAILY TREATMENT NOTE - [facility] 2-15    Patient Name: Apollo Agarwal  Date:6/15/2018  : 1931  [x]  Patient  Verified  Payor: [place] MEDICARE / Plan: [place] MEDICARE PART A & B / Product Type: Medicare /    In time: 11:30a  Out time:12:25p  Total Treatment Time (min): 55  Total Timed Codes (min): 55  1:1 Treatment Time ([address] only): 54   Visit #: 12     Treatment Area: Gait instability [R26.81]    SUBJECTIVE  Pain Level (0-10 scale): 0/10   Any medication changes, allergies to medications, adverse drug reactions, diagnosis change, or new procedure performed?: [x] No    [] Yes (see summary sheet for update)  Subjective functional status/changes:   [] No changes reported  Patient reports fall on Thursday where she got her foot caught on between the sidewalk and gravel unloading her car when she fell. OBJECTIVE    40 min Therapeutic Exercise:  [x] See flow sheet :   Rationale: increase ROM, increase strength, improve coordination, improve balance and increase proprioception to improve the patients ability to navigate stairs, ambulate on uneven surfaces, and transfer without risk of falling. 15 min Neuromuscular Re-education:  [x]  See flow sheet :   Rationale: increase strength, improve coordination and improve balance  to improve the patients ability to sit, stand, reach, ambulate, lift, carry and complete ADL's.      With   [] TE   [] TA   [] neuro   [] other: Patient Education: [x] Review HEP    [] Progressed/Changed HEP based on:   [] positioning   [] body mechanics   [] transfers   [] heat/ice application    [] other:      Other Objective/Functional Measures:   BP: pre-treatment: 147/74  Pt mateo to advance several neuro re-education exercises.   Increased sway with EC stance today with several LOB       Pain Level (0-10 scale) post treatment: 0     ASSESSMENT/Changes in Function:     Patient will continue to benefit from skilled PT services to modify and progress therapeutic interventions, address functional mobility deficits, address ROM deficits, address strength deficits, analyze and address soft tissue restrictions, analyze and cue movement patterns, analyze and modify body mechanics/ergonomics and assess and modify postural abnormalities to attain remaining goals. []  See Plan of Care  []  See progress note/recertification  []  See Discharge Summary         Progress towards goals / Updated goals: Patient demonstrates improved tolerance with intervention and mod fatigue noted. Patient requires verbal cues for postural awareness and proper exercise reproduction.      PLAN  []  Upgrade activities as tolerated     [x]  Continue plan of care  []  Update interventions per flow sheet       []  Discharge due to:_  []  Other:_          Lee Davis 6/15/2018  12:22 PM

## 2018-06-19 ENCOUNTER — HOSPITAL ENCOUNTER (OUTPATIENT)
Dept: PHYSICAL THERAPY | Age: 83
Discharge: HOME | End: 2018-06-19
Payer: MEDICARE

## 2018-06-19 PROCEDURE — 97110 THERAPEUTIC EXERCISES: CPT

## 2018-06-19 PROCEDURE — 97112 NEUROMUSCULAR REEDUCATION: CPT

## 2018-06-19 NOTE — PROGRESS NOTES
PT DAILY TREATMENT NOTE - [facility] 2-15    Patient Name: Russ Argueta  Date:2018  : 1931  [x]  Patient  Verified  Payor: Darrion Raymond / Plan: [place] MEDICARE PART A & B / Product Type: Medicare /    In time: 11:05a  Out time:12:00p  Total Treatment Time (min): 55  Total Timed Codes (min): 55  1:1 Treatment Time ([address] only): 54   Visit #: 15     Treatment Area: Gait instability [R26.81]    SUBJECTIVE  Pain Level (0-10 scale): 0/10   Any medication changes, allergies to medications, adverse drug reactions, diagnosis change, or new procedure performed?: [x] No    [] Yes (see summary sheet for update)  Subjective functional status/changes:   [] No changes reported  Patient reports she is feeling much better and is only a little sore through her L knee. OBJECTIVE    40 min Therapeutic Exercise:  [x] See flow sheet :   Rationale: increase ROM, increase strength, improve coordination, improve balance and increase proprioception to improve the patients ability to navigate stairs, ambulate on uneven surfaces, and transfer without risk of falling.     15 min Neuromuscular Re-education:  [x]  See flow sheet :   Rationale: increase strength, improve coordination and improve balance  to improve the patients ability to sit, stand, reach, ambulate, lift, carry and complete ADL's.      With   [] TE   [] TA   [] neuro   [] other: Patient Education: [x] Review HEP    [] Progressed/Changed HEP based on:   [] positioning   [] body mechanics   [] transfers   [] heat/ice application    [] other:      Other Objective/Functional Measures:   BP: pre-treatment: 150/80  Ziegler/56  TUG: 15 sec       Pain Level (0-10 scale) post treatment: 0     ASSESSMENT/Changes in Function:     Patient will continue to benefit from skilled PT services to modify and progress therapeutic interventions, address functional mobility deficits, address ROM deficits, address strength deficits, analyze and address soft tissue restrictions, analyze and cue movement patterns, analyze and modify body mechanics/ergonomics and assess and modify postural abnormalities to attain remaining goals. []  See Plan of Care  []  See progress note/recertification  []  See Discharge Summary         Progress towards goals / Updated goals: Patient demonstrates good tolerance with interventions and is able to progress several with mod fatigue noted. Patient will do well with continued progression as tolerated. Short Term Goals: To be accomplished in 6 weeks:MET  To demonstrate compliance with safe transfer technique  To demonstrated compliance with HEP  To increase LE strength by 1/2 ms grade tolerate standing for 30 minutes to cook meals independently  To improve Ziegler to medium fall risk so patient can get in/out shower and car  Long Term Goals: To be accomplished in 12 weeks: In progress  To improve LE strength by 1 ms grade so patient can stand/walk for 1 hour to grocery shop  To improve Ziegler score to low fall risk so patient can get to all Dr. Sari Cevallos independently  To improve lumbar ROM to [facility] so patient can bend to the floor to dress   Frequency / Duration: Patient to be seen 2 times per week for 12 weeks     PLAN  []  Upgrade activities as tolerated     [x]  Continue plan of care  []  Update interventions per flow sheet       []  Discharge due to:_  [x]  Other:_   pt needs FOTO assessment at next visit.         Giorgio 6/19/2018  11:22 AM

## 2018-06-26 ENCOUNTER — OFFICE VISIT (OUTPATIENT)
Dept: INTERNAL MEDICINE CLINIC | Age: 83
End: 2018-06-26

## 2018-06-26 ENCOUNTER — HOSPITAL ENCOUNTER (OUTPATIENT)
Dept: PHYSICAL THERAPY | Age: 83
Discharge: HOME | End: 2018-06-26
Payer: MEDICARE

## 2018-06-26 ENCOUNTER — HOSPITAL ENCOUNTER (OUTPATIENT)
Dept: LAB | Age: 83
Discharge: HOME | End: 2018-06-26
Payer: MEDICARE

## 2018-06-26 VITALS
BODY MASS INDEX: 32.57 KG/M2 | OXYGEN SATURATION: 95 % | DIASTOLIC BLOOD PRESSURE: 83 MMHG | HEART RATE: 65 BPM | HEIGHT: 61 IN | RESPIRATION RATE: 18 BRPM | SYSTOLIC BLOOD PRESSURE: 148 MMHG | TEMPERATURE: 98.1 F | WEIGHT: 172.5 LBS

## 2018-06-26 DIAGNOSIS — I10 ESSENTIAL HYPERTENSION: ICD-10-CM

## 2018-06-26 DIAGNOSIS — F51.04 PSYCHOPHYSIOLOGICAL INSOMNIA: ICD-10-CM

## 2018-06-26 PROCEDURE — 97110 THERAPEUTIC EXERCISES: CPT

## 2018-06-26 PROCEDURE — 80048 BASIC METABOLIC PNL TOTAL CA: CPT

## 2018-06-26 PROCEDURE — 97112 NEUROMUSCULAR REEDUCATION: CPT

## 2018-06-26 RX ORDER — LATANOPROST 50 UG/ML
SOLUTION/ DROPS OPHTHALMIC
Status: ON HOLD | COMMUNITY
End: 2018-08-29

## 2018-06-26 RX ORDER — MIRTAZAPINE 15 MG/1
TABLET, FILM COATED ORAL
Qty: 40 TAB | Refills: 2 | Status: SHIPPED | OUTPATIENT
Start: 2018-06-26 | End: 2018-10-16 | Stop reason: SDUPTHER

## 2018-06-26 RX ORDER — SPIRONOLACTONE 50 MG/1
TABLET, FILM COATED ORAL
Qty: 45 TAB | Refills: 3 | Status: ON HOLD | OUTPATIENT
Start: 2018-06-26 | End: 2018-08-29

## 2018-06-26 NOTE — PROGRESS NOTES
PT DAILY TREATMENT NOTE - [facility] 2-15    Patient Name: Sabiha Montague  Date:2018  : 1931  [x]  Patient  Verified  Payor: Kenyatta Castro / Plan: [place] MEDICARE PART A & B / Product Type: Medicare /    In time: 9:05a  Out time:10:00p  Total Treatment Time (min): 55  Total Timed Codes (min): 55  1:1 Treatment Time ([address] only): 55   Visit #: 16     Treatment Area: Gait instability [R26.81]    SUBJECTIVE  Pain Level (0-10 scale): 0/10   Any medication changes, allergies to medications, adverse drug reactions, diagnosis change, or new procedure performed?: [x] No    [] Yes (see summary sheet for update)  Subjective functional status/changes:   [] No changes reported  Patient reports she is able to lift her legs in and out of car without UE assist.    OBJECTIVE    40 min Therapeutic Exercise:  [x] See flow sheet :   Rationale: increase ROM, increase strength, improve coordination, improve balance and increase proprioception to improve the patients ability to navigate stairs, ambulate on uneven surfaces, and transfer without risk of falling.     15 min Neuromuscular Re-education:  [x]  See flow sheet :   Rationale: increase strength, improve coordination and improve balance  to improve the patients ability to sit, stand, reach, ambulate, lift, carry and complete ADL's.      With   [] TE   [] TA   [] neuro   [] other: Patient Education: [x] Review HEP    [] Progressed/Changed HEP based on:   [] positioning   [] body mechanics   [] transfers   [] heat/ice application    [] other:      Other Objective/Functional Measures:   BP: pre-treatment: 150/80  Ziegler/56  TUG: 15 sec      LOWER QUARTER MUSCLE STRENGTH      R  L  L1, L2 Psoas   4/5  4/5    L3 Quads   5/5  5/5   L4 Tib Ant   4+/5  4+/5   L5 EHL   4+/5  4+/5    S1 FHL   5/5  5/5   S2 Hams   5/5  5/5         Pain Level (0-10 scale) post treatment: 0     ASSESSMENT/Changes in Function:     Patient will continue to benefit from skilled PT services to modify and progress therapeutic interventions, address functional mobility deficits, address ROM deficits, address strength deficits, analyze and address soft tissue restrictions, analyze and cue movement patterns, analyze and modify body mechanics/ergonomics and assess and modify postural abnormalities to attain remaining goals. []  See Plan of Care  []  See progress note/recertification  []  See Discharge Summary         Progress towards goals / Updated goals: Patient demonstrates good tolerance with interventions and is able to progress several with mod fatigue noted. Patient will do well with continued progression for neuro re-education interventions for further improvement and decreased fall risk. Short Term Goals: To be accomplished in 6 weeks:MET  To demonstrate compliance with safe transfer technique  To demonstrated compliance with HEP  To increase LE strength by 1/2 ms grade tolerate standing for 30 minutes to cook meals independently  To improve Ziegler to medium fall risk so patient can get in/out shower and car  Long Term Goals: To be accomplished in 12 weeks: To improve LE strength by 1 ms grade so patient can stand/walk for 1 hour to grocery shop Progressing  To improve Ziegler score to low fall risk so patient can get to all Dr. Velma Tomlin independently Met, will do well with continued PT for further safety  To improve lumbar ROM to [facility] so patient can bend to the floor to dress Progressing  Frequency / Duration: Patient to be seen 2 times per week for 12 weeks     PLAN  []  Upgrade activities as tolerated     [x]  Continue plan of care  []  Update interventions per flow sheet       []  Discharge due to:_  [x]  Other:_   pt needs FOTO assessment at next visit.         Binu Cord 6/26/2018  11:22 AM

## 2018-06-26 NOTE — MR AVS SNAPSHOT
Radha Nash 
 
 
 [address] [facility] [address] [facility] 
[phone] Patient: Angel Falcon MRN:  [id number] Visit Information Date & Time Provider Department Dept. Phone Encounter #  
 6/26/2018 11:30 AM Mee Denny MD [facility] [address] [id number] Follow-up Instructions Return in about 2 months (around 8/26/2018). Upcoming Health Maintenance Date Due DTaP/Tdap/Td series (1 - Tdap) 7/20/1952 Bone Densitometry (Dexa) Screening 6/9/2021* Influenza Age 5 to Adult 8/1/2018 MEDICARE YEARLY EXAM 5/31/2019 GLAUCOMA SCREENING Q2Y 12/15/2019 *Topic was postponed. The date shown is not the original due date. Allergies as of 6/26/2018  Review Complete On: 5/30/2018 By: Mee Denny MD  
  
 Severity Noted Reaction Type Reactions Meperidine Medium 03/11/2016    Hives Other reaction(s): Hives Ace Inhibitors  05/14/2010    Unknown (comments) Aldactone [Spironolactone]  01/30/2018    Other (comments)  
 hyperkalemia Codeine  05/14/2010    Unknown (comments) Miralax [Polyethylene Glycol 3350]  05/14/2010    Nausea and Vomiting Other Plant, Animal, Environmental  07/14/2017    Rash Bilateral Hearing Aids cause severe reaction per pt report Percocet [Oxycodone-acetaminophen]  05/14/2010    Unknown (comments) Statins-hmg-coa Reductase Inhibitors  05/14/2010    Unknown (comments) Sular [Nisoldipine]  05/14/2010    Rash Zetia [Ezetimibe]  05/14/2010    Unknown (comments) Current Immunizations  Reviewed on 10/23/2017 Name Date Influenza High Dose Vaccine PF 10/23/2017, 10/27/2016 Influenza Vaccine 9/25/2013 Influenza Vaccine Split 9/19/2012, 11/17/2011, 11/10/2010 Pneumococcal Conjugate (PCV-13) 10/27/2016 ZZZ-RETIRED (DO NOT USE) Pneumococcal Vaccine (Unspecified Type) 5/14/2001 Not reviewed this visit You Were Diagnosed With   
  
 Codes Comments Essential hypertension     ICD-10-CM: I10 
ICD-9-CM: 401.9 Psychophysiological insomnia     ICD-10-CM: F51.04 
ICD-9-CM: 307.42 Vitals BP Pulse Temp Resp Height(growth percentile) Weight(growth percentile) 148/83 (BP 1 Location: Left arm, BP Patient Position: Sitting) 65 98.1 °F (36.7 °C) (Oral) 18 5' 1\" (1.549 m) 172 lb 8 oz (78.2 kg) SpO2 BMI OB Status Smoking Status 95% 32.59 kg/m2 Hysterectomy Never Smoker Vitals History BMI and BSA Data Body Mass Index Body Surface Area 32.59 kg/m 2 1.83 m 2 Preferred Pharmacy Pharmacy Name Phone [id number] [address] [phone] Your Updated Medication List  
  
   
This list is accurate as of 6/26/18 11:57 AM.  Always use your most recent med list.  
  
  
  
  
 bumetanide 0.5 mg tablet Commonly known as:  Tura Althea Take 1 Tab by mouth daily. clobetasol 0.05 % topical foam  
Commonly known as:  OLUX  
APPLY 1 APPLICATION TO AFFECTED AREA TWICE A DAY EXTERNALLY TO EARS 7 DAYS, AS NEEDED FOR FLARES  
  
 metoprolol succinate 100 mg tablet Commonly known as:  TOPROL-XL Take  by mouth daily. mirtazapine 15 mg tablet Commonly known as:  REMERON  
1-2 tabs at bedtime prn REFRESH OP Apply  to eye daily. spironolactone 50 mg tablet Commonly known as:  ALDACTONE  
1 tab in am and 1/2 tab in pm  
  
 * XALATAN 0.005 % ophthalmic solution Generic drug:  latanoprost  
Administer 1 Drop to both eyes nightly. * XALATAN 0.005 % ophthalmic solution Generic drug:  latanoprost  
Apply  to eye. * Notice: This list has 2 medication(s) that are the same as other medications prescribed for you. Read the directions carefully, and ask your doctor or other care provider to review them with you. Prescriptions Sent to Pharmacy Refills spironolactone (ALDACTONE) 50 mg tablet 3 Si tab in am and 1/2 tab in pm  
 Class: Normal  
 Pharmacy: [address] [place] [address] Ph #: [phone]  
 mirtazapine (REMERON) 15 mg tablet 2 Si-2 tabs at bedtime prn Class: Normal  
 Pharmacy: [facility]/pharmacy  [address], [address] [facility] Ph #: [phone] We Performed the Following METABOLIC PANEL, BASIC [49896 CPT(R)] Follow-up Instructions Return in about 2 months (around 2018). To-Do List   
 2018 11:30 AM  
  Appointment with Kim Forrester PT at [facility] PT Janna Yusuf ([phone]) Please remember to arrive at the hospital at least 30 minutes prior to your scheduled appointment time. When you come in for your appointment, please be sure to bring the therapy prescription the doctor gave you, your insurance card, and a list of the medicines you are taking. Also, please remember to wear comfortable, loose- fitting clothes. We look forward to seeing you. 07/10/2018 11:00 AM  
  Appointment with Erum Simons at [facility] PT Janna Yusuf ([phone]) Please remember to arrive at the hospital at least 30 minutes prior to your scheduled appointment time. When you come in for your appointment, please be sure to bring the therapy prescription the doctor gave you, your insurance card, and a list of the medicines you are taking. Also, please remember to wear comfortable, loose- fitting clothes. We look forward to seeing you. 2018 11:00 AM  
  Appointment with Erum Simons at [facility] PT Janna Yusuf ([phone]) Please remember to arrive at the hospital at least 30 minutes prior to your scheduled appointment time. When you come in for your appointment, please be sure to bring the therapy prescription the doctor gave you, your insurance card, and a list of the medicines you are taking.   Also, please remember to wear comfortable, loose- fitting clothes. We look forward to seeing you. 07/17/2018 11:00 AM  
  Appointment with Jimy Laguerre at [facility] PT Janna Yusuf ([phone]) Please remember to arrive at the hospital at least 30 minutes prior to your scheduled appointment time. When you come in for your appointment, please be sure to bring the therapy prescription the doctor gave you, your insurance card, and a list of the medicines you are taking. Also, please remember to wear comfortable, loose- fitting clothes. We look forward to seeing you. Introducing \[facility] [place] [facility]\"" & HEALTH SERVICES! [employer] introduces Fitbit patient portal. Now you can access parts of your medical record, email your doctor's office, and request medication refills online. 1. In your internet browser, go to [email or web address] 2. Click on the First Time User? Click Here link in the Sign In box. You will see the New Member Sign Up page. 3. Enter your Fitbit Access Code exactly as it appears below. You will not need to use this code after youve completed the sign-up process. If you do not sign up before the expiration date, you must request a new code. · Fitbit Access Code: [email or web address] Expires: 7/10/2018  9:42 AM 
 
4. Enter the last four digits of your Social Security Number (xxxx) and Date of Birth (mm/dd/yyyy) as indicated and click Submit. You will be taken to the next sign-up page. 5. Create a Exerscript ID. This will be your Fitbit login ID and cannot be changed, so think of one that is secure and easy to remember. 6. Create a Exerscript password. You can change your password at any time. 7. Enter your Password Reset Question and Answer. This can be used at a later time if you forget your password. 8. Enter your e-mail address. You will receive e-mail notification when new information is available in [address] 9. Click Sign Up. You can now view and download portions of your medical record. 10. Click the Download Summary menu link to download a portable copy of your medical information. If you have questions, please visit the Frequently Asked Questions section of the [email or web address] website. Remember, [email or web address] is NOT to be used for urgent needs. For medical emergencies, dial 911. Now available from your iPhone and Android! Please provide this summary of care documentation to your next provider. Your primary care clinician is listed as Gwen Lerma. If you have any questions after today's visit, please call [phone].

## 2018-06-26 NOTE — PROGRESS NOTES
HISTORY OF PRESENT ILLNESS  Augustine Tejada is a 80 y.o. female. HPI  Hypertension:  Augustine Tejada is a 80 y.o. female with hypertension. with Chronic kidney disease stage 3   Medication change since last visit: Yes: Comment: increased spironolactone  The patient reports:  taking medications as instructed, no medication side effects noted, home BP monitoring in range of 629'M systolic over 33'R diastolic, no chest pain on exertion, no dyspnea on exertion, no swelling of ankles, no orthostatic dizziness or lightheadedness, no palpitations. Lifestyle modification/social history: generally follows a low fat low cholesterol diet, generally follows a low sodium diet, nonsmoker    Lab Results   Component Value Date/Time    Sodium 144 04/25/2018 12:00 AM    Potassium 4.9 04/25/2018 12:00 AM    Chloride 104 04/25/2018 12:00 AM    CO2 22 04/25/2018 12:00 AM    Anion gap 2 (L) 08/01/2017 12:10 AM    Glucose 76 04/25/2018 12:00 AM    BUN 16 04/25/2018 12:00 AM    Creatinine 0.90 04/25/2018 12:00 AM    BUN/Creatinine ratio 18 04/25/2018 12:00 AM    GFR est AA 67 04/25/2018 12:00 AM    GFR est non-AA 58 (L) 04/25/2018 12:00 AM    Calcium 9.3 04/25/2018 12:00 AM           ROS    Physical Exam   Constitutional: She appears well-developed and well-nourished. No distress. /83 (BP 1 Location: Left arm, BP Patient Position: Sitting)  Pulse 65  Temp 98.1 °F (36.7 °C) (Oral)   Resp 18  Ht 5' 1\" (1.549 m)  Wt 172 lb 8 oz (78.2 kg)  SpO2 95%  BMI 32.59 kg/m2Body mass index is 32.59 kg/(m^2). HENT:   Mouth/Throat: Oropharynx is clear and moist.   Neck: No JVD present. Carotid bruit is not present. Cardiovascular: Normal rate, regular rhythm, normal heart sounds and intact distal pulses. Occasional extrasystoles are present. Pulses:       Posterior tibial pulses are 1+ on the right side, and 1+ on the left side.    Pulmonary/Chest: Effort normal and breath sounds normal.   Musculoskeletal: She exhibits no edema. Neurological: She is alert. Skin: Skin is warm and dry. She is not diaphoretic. Nursing note and vitals reviewed. ASSESSMENT and PLAN  Diagnoses and all orders for this visit:    1. Essential hypertension -not to goal.  Increase spironolactone. Log blood pressures at home while sitting, relaxed 3-5 times weekly and bring to next visit. Pt educated on goal BP of 130/80 on average or lower. Call office as soon as possible if BP's over 140/90 or below 110/50 on multiple occasions and/or with symptoms of dizziness, chest pain, shortness of breath, headache or ankle swelling. Recheck log and bp here in 2 month(s). -     spironolactone (ALDACTONE) 50 mg tablet; 1 tab in am and 1/2 tab in pm  -     METABOLIC PANEL, BASIC    2. Psychophysiological insomnia - rare episodes of difficulty initiating sleep until 3 am.  She can add another 15 mg remeron if not falling asleep in 30 min. -     mirtazapine (REMERON) 15 mg tablet; 1-2 tabs at bedtime prn      Follow-up Disposition:  Return in about 2 months (around 8/26/2018).

## 2018-06-26 NOTE — PROGRESS NOTES
[facility]  [place]  [facility]  [place], [address]  Phone: [phone]      Fax:  [phone]    Progress Note    Name: Kiko Carlson   : 1931   MD: Angel Penn MD       Treatment Diagnosis: Gait instability [R26.81]  Start of Care: 2018    Visits from Start of Care: 16  Missed Visits: 0    Summary of Care:Patient has been seen for 16 visit for balance and conditioning due to increased fall risk. She is making steady progress towards all goals. See below for objective updates and goals. Other Objective/Functional Measures:   BP: pre-treatment: 150/80  Ziegler/56  TUG: 15 sec        LOWER QUARTER MUSCLE STRENGTH                                                                                          R                                          L  L1, L2 Psoas                                                         4/5                                       4/5                                         L3 Quads                                                              5/5                                       5/5                   L4 Tib Ant                                                              4+/5                                     4+/5                 L5 EHL                                                                  4+/5                                     4+/5                                       S1 FHL                                                                  5/5                                       5/5                   S2 Hams                                                               5/5                                       5/5         Progress towards goals / Updated goals: Patient demonstrates good tolerance with interventions and is able to progress several with mod fatigue noted.  Patient will do well with continued progression for neuro re-education interventions for further improvement and decreased fall risk.     Short Term Goals: To be accomplished in 6 weeks:MET  To demonstrate compliance with safe transfer technique  To demonstrated compliance with HEP  To increase LE strength by 1/2 ms grade tolerate standing for 30 minutes to cook meals independently  To improve Ziegler to medium fall risk so patient can get in/out shower and car  Long Term Goals: To be accomplished in 12 weeks: To improve LE strength by 1 ms grade so patient can stand/walk for 1 hour to grocery shop Progressing  To improve Ziegler score to low fall risk so patient can get to all Dr. Stephy Slade independently Met, will do well with continued PT for further safety  To improve lumbar ROM to [facility] so patient can bend to the floor to dress Aman Staton, PT 6/26/2018 2:19 PM    ________________________________________________________________________  NOTE TO PHYSICIAN:  Please complete the following and fax to: [facility]: Fax: [phone]. Guille Tavares Retain this original for your records. If you are unable to process this request in 24 hours, please contact our office.        ____ I have read the above report and request that my patient continue therapy with the following changes/special instructions:  ____ I have read the above report and request that my patient be discharged from therapy    Physician's Signature:_________________ Date:___________Time:__________

## 2018-06-27 LAB
BUN SERPL-MCNC: 21 MG/DL (ref 8–27)
BUN/CREAT SERPL: 22 (ref 12–28)
CALCIUM SERPL-MCNC: 9.2 MG/DL (ref 8.7–10.3)
CHLORIDE SERPL-SCNC: 104 MMOL/L (ref 96–106)
CO2 SERPL-SCNC: 22 MMOL/L (ref 20–29)
CREAT SERPL-MCNC: 0.94 MG/DL (ref 0.57–1)
GLUCOSE SERPL-MCNC: 87 MG/DL (ref 65–99)
INTERPRETATION: NORMAL
POTASSIUM SERPL-SCNC: 4.9 MMOL/L (ref 3.5–5.2)
SODIUM SERPL-SCNC: 142 MMOL/L (ref 134–144)

## 2018-07-03 ENCOUNTER — HOSPITAL ENCOUNTER (OUTPATIENT)
Dept: PHYSICAL THERAPY | Age: 83
Discharge: HOME | End: 2018-07-03
Payer: MEDICARE

## 2018-07-03 PROCEDURE — 97112 NEUROMUSCULAR REEDUCATION: CPT | Performed by: PHYSICAL THERAPIST

## 2018-07-03 PROCEDURE — 97110 THERAPEUTIC EXERCISES: CPT | Performed by: PHYSICAL THERAPIST

## 2018-07-03 NOTE — PROGRESS NOTES
PT DAILY TREATMENT NOTE - [facility] 2-15    Patient Name: Irma Cavazos  Date:7/3/2018  : 1931  [x]  Patient  Verified  Payor: [place] MEDICARE / Plan: [place] MEDICARE PART A & B / Product Type: Medicare /    In time: 11:15 AM  Out time:12:15 PM  Total Treatment Time (min): 60  Total Timed Codes (min): 60  1:1 Treatment Time ([facility] only): 60   Visit #: 17    Treatment Area: Gait instability [R26.81]    SUBJECTIVE  Pain Level (0-10 scale): 0/10   Any medication changes, allergies to medications, adverse drug reactions, diagnosis change, or new procedure performed?: [x] No    [] Yes (see summary sheet for update)  Subjective functional status/changes:   [] No changes reported  Patient reports that she feels more unstable when she is fatigued, typically after walking at the grocery store. OBJECTIVE    45 min Therapeutic Exercise:  [x] See flow sheet :   Rationale: increase ROM, increase strength, improve coordination, improve balance and increase proprioception to improve the patients ability to navigate stairs, ambulate on uneven surfaces, and transfer without risk of falling.     15 min Neuromuscular Re-education:  [x]  See flow sheet :   Rationale: increase strength, improve coordination and improve balance  to improve the patients ability to sit, stand, reach, ambulate, lift, carry and complete ADL's.      With   [] TE   [] TA   [] neuro   [] other: Patient Education: [x] Review HEP    [] Progressed/Changed HEP based on:   [] positioning   [] body mechanics   [] transfers   [] heat/ice application    [] other:      Other Objective/Functional Measures:   BP: pre-treatment: 138/87         post-treatment: 126/97       Pain Level (0-10 scale) post treatment: 0     ASSESSMENT/Changes in Function:     Patient will continue to benefit from skilled PT services to modify and progress therapeutic interventions, address functional mobility deficits, address ROM deficits, address strength deficits, analyze and address soft tissue restrictions, analyze and cue movement patterns, analyze and modify body mechanics/ergonomics and assess and modify postural abnormalities to attain remaining goals. []  See Plan of Care  []  See progress note/recertification  []  See Discharge Summary         Progress towards goals / Updated goals: Patient had significant fatigue with exercise and required multiple rest breaks to reduce SOB. Patient did not have any episodes of dizziness or orthostatic hypotension.  Patient will continue to do well with progression of neuro re-education interventions and LE strength exercise to increase endurance and decrease fall risk.       PLAN  []  Upgrade activities as tolerated     [x]  Continue plan of care  []  Update interventions per flow sheet       []  Discharge due to:_  [x]  Other:_   pt needs FOTO assessment at next visit        Via Wilder Bass , SPT  7/3/2018  11:22 AM  Didi Ansari PT, DPT

## 2018-07-10 ENCOUNTER — HOSPITAL ENCOUNTER (OUTPATIENT)
Dept: PHYSICAL THERAPY | Age: 83
Discharge: HOME | End: 2018-07-10
Payer: MEDICARE

## 2018-07-10 PROCEDURE — 97110 THERAPEUTIC EXERCISES: CPT

## 2018-07-10 PROCEDURE — 97112 NEUROMUSCULAR REEDUCATION: CPT

## 2018-07-10 NOTE — PROGRESS NOTES
PT DAILY TREATMENT NOTE - [facility] 2-15    Patient Name: Lizandro Estrada  Date:7/10/2018  : 1931  [x]  Patient  Verified  [place]or: [place] MED[place] / Plan: [place] MED[place]ARE PART A & B / Product Type: Medicare /    In time: 11:00a  Out time:12:00p  Total Treatment Time (min): 60  Total Timed Codes (min): 60  1:1 Treatment Time ([address] only): 60   Visit #: 18    Treatment Area: Gait instability [R26.81]    SUBJECTIVE  Pain Level (0-10 scale): 0/10   Any medication changes, allergies to medications, adverse drug reactions, diagnosis change, or new procedure performed?: [x] No    [] Yes (see summary sheet for update)  Subjective functional status/changes:   [] No changes reported  Patient reports she is feeling good, but still has some soreness in L knee. Pt reports her physician changed her BP medications and wants her BP close to 966 systolic. 84OBJECTIVE    45 min Therapeutic Exercise:  [x] See flow sheet :   Rationale: increase ROM, increase strength, improve coordination, improve balance and increase proprioception to improve the patients ability to navigate stairs, ambulate on uneven surfaces, and transfer without risk of falling.     15 min Neuromuscular Re-education:  [x]  See flow sheet :   Rationale: increase strength, improve coordination and improve balance  to improve the patients ability to sit, stand, reach, ambulate, lift, carry and complete ADL's.      With   [x] TE   [x] TA   [] neuro   [] other: Patient Education: [x] Review HEP    [] Progressed/Changed HEP based on:   [] positioning   [] body mechanics   [] transfers   [] heat/ice application    [] other:      Other Objective/Functional Measures:   BP: pre-treatment: 139/74         post-treatment: 139/84         Pain Level (0-10 scale) post treatment: 0/10    ASSESSMENT/Changes in Function:     Patient will continue to benefit from skilled PT services to modify and progress therapeutic interventions, address functional mobility deficits, address ROM deficits, address strength deficits, analyze and address soft tissue restrictions, analyze and cue movement patterns, analyze and modify body mechanics/ergonomics and assess and modify postural abnormalities to attain remaining goals. []  See Plan of Care  []  See progress note/recertification  []  See Discharge Summary         Progress towards goals / Updated goals:   Patient presented with increased fatigue with exercises on today's visit, but was able to complete exercises with extra time. Patient demonstrates increased difficulty with neuro re-education interventions with increased sway and LOB. Short Term Goals: To be accomplished in 6 weeks: Met  To demonstrate compliance with safe transfer technique  To demonstrated compliance with HEP  To increase LE strength by 1/2 ms grade tolerate standing for 30 minutes to cook meals independently   To improve Ziegler to medium fall risk so patient can get in/out shower and car  Long Term Goals:  To be accomplished in 12 weeks: Progressing  To improve LE strength by 1 ms grade so patient can stand/walk for 1 hour to grocery shop  To improve Ziegler score to low fall risk so patient can get to all Dr. Elmo Borges independently  To improve lumbar ROM to [facility] so patient can bend to the floor to dress  Frequency / Duration: Patient to be seen 2 times per week for 12 weeks.     PLAN  [x]  Upgrade activities as tolerated     [x]  Continue plan of care  [x]  Update interventions per flow sheet       []  Discharge due to:_  []  Other:_          ROSIE Hughes, THOMAS  7/10/2018  11:22 AM

## 2018-07-13 ENCOUNTER — HOSPITAL ENCOUNTER (OUTPATIENT)
Dept: PHYSICAL THERAPY | Age: 83
Discharge: HOME | End: 2018-07-13
Payer: MEDICARE

## 2018-07-13 PROCEDURE — 97112 NEUROMUSCULAR REEDUCATION: CPT

## 2018-07-13 PROCEDURE — 97110 THERAPEUTIC EXERCISES: CPT

## 2018-07-13 NOTE — PROGRESS NOTES
PT DAILY TREATMENT NOTE - [facility] 2-15    Patient Name: Anup Stockton  Date:2018  : 1931  [x]  Patient  Verified  Payor: Marielle Reaves / Plan: [place] MEDICARE PART A & B / Product Type: Medicare /    In time: 11:00a  Out time:12:00p  Total Treatment Time (min): 60  Total Timed Codes (min): 60  1:1 Treatment Time ( [address] only): 60   Visit #: 19    Treatment Area: Gait instability [R26.81]    SUBJECTIVE  Pain Level (0-10 scale): 0/10   Any medication changes, allergies to medications, adverse drug reactions, diagnosis change, or new procedure performed?: [x] No    [] Yes (see summary sheet for update)  Subjective functional status/changes:   [] No changes reported  Patient reports she is feeling better and has not been using SPC as much unless she is out for long periods or is on unstable surfaces. Patient states she brings her cane with her just incase she needs it. 84OBJECTIVE    45 min Therapeutic Exercise:  [x] See flow sheet :   Rationale: increase ROM, increase strength, improve coordination, improve balance and increase proprioception to improve the patients ability to navigate stairs, ambulate on uneven surfaces, and transfer without risk of falling.     15 min Neuromuscular Re-education:  [x]  See flow sheet :   Rationale: increase strength, improve coordination and improve balance  to improve the patients ability to sit, stand, reach, ambulate, lift, carry and complete ADL's.      With   [x] TE   [x] TA   [] neuro   [] other: Patient Education: [x] Review HEP    [] Progressed/Changed HEP based on:   [] positioning   [] body mechanics   [] transfers   [] heat/ice application    [] other:      Other Objective/Functional Measures:   BP: pre-treatment: 138/78           Pain Level (0-10 scale) post treatment: 0/10    ASSESSMENT/Changes in Function:     Patient will continue to benefit from skilled PT services to modify and progress therapeutic interventions, address functional mobility deficits, address ROM deficits, address strength deficits, analyze and address soft tissue restrictions, analyze and cue movement patterns, analyze and modify body mechanics/ergonomics and assess and modify postural abnormalities to attain remaining goals. []  See Plan of Care  []  See progress note/recertification  []  See Discharge Summary         Progress towards goals / Updated goals:   Patient demonstrates improved tolerance for interventions and has made good progress with strength and balance interventions. Patient continues to have increased difficulty with eyes closed balance interventions and requires frequent rest breaks between exercises. Short Term Goals: To be accomplished in 6 weeks: Met  To demonstrate compliance with safe transfer technique  To demonstrated compliance with HEP  To increase LE strength by 1/2 ms grade tolerate standing for 30 minutes to cook meals independently   To improve Ziegler to medium fall risk so patient can get in/out shower and car  Long Term Goals:  To be accomplished in 12 weeks: Progressing  To improve LE strength by 1 ms grade so patient can stand/walk for 1 hour to grocery shop  To improve Ziegler score to low fall risk so patient can get to all Dr. Abbi Castellanos independently  To improve lumbar ROM to [facility] so patient can bend to the floor to dress  Frequency / Duration: Patient to be seen 2 times per week for 12 weeks.     PLAN  [x]  Upgrade activities as tolerated     [x]  Continue plan of care  [x]  Update interventions per flow sheet       []  Discharge due to:_  []  Other:_          ROSIE Watts, THOMAS  7/13/2018  11:22 AM

## 2018-07-17 ENCOUNTER — HOSPITAL ENCOUNTER (OUTPATIENT)
Dept: PHYSICAL THERAPY | Age: 83
Discharge: HOME | End: 2018-07-17
Payer: MEDICARE

## 2018-07-17 PROCEDURE — 97112 NEUROMUSCULAR REEDUCATION: CPT

## 2018-07-17 PROCEDURE — 97110 THERAPEUTIC EXERCISES: CPT

## 2018-07-17 NOTE — PROGRESS NOTES
PT DAILY TREATMENT NOTE - [facility] 2-15    Patient Name: Bhavya Farah  Date:2018  : 1931  [x]  Patient  Verified  Payor: Shira Breath / Plan: [place] MEDICARE PART A & B / Product Type: Medicare /    In time: 10:50a  Out time: 11:45a  Total Treatment Time (min): 55  Total Timed Codes (min): 55  1:1 Treatment Time ([address] only): 55   Visit #: 20    Treatment Area: Gait instability [R26.81]    SUBJECTIVE  Pain Level (0-10 scale): 0/10   Any medication changes, allergies to medications, adverse drug reactions, diagnosis change, or new procedure performed?: [x] No    [] Yes (see summary sheet for update)  Subjective functional status/changes:   [] No changes reported  Patient reports she was able to pull weeds in the garden yesterday without LOB. 84OBJECTIVE    40 min Therapeutic Exercise:  [x] See flow sheet :   Rationale: increase ROM, increase strength, improve coordination, improve balance and increase proprioception to improve the patients ability to navigate stairs, ambulate on uneven surfaces, and transfer without risk of falling. 15 min Neuromuscular Re-education:  [x]  See flow sheet :   Rationale: increase strength, improve coordination and improve balance  to improve the patients ability to sit, stand, reach, ambulate, lift, carry and complete ADL's.      With   [x] TE   [x] TA   [] neuro   [] other: Patient Education: [x] Review HEP    [] Progressed/Changed HEP based on:   [] positioning   [] body mechanics   [] transfers   [] heat/ice application    [] other:      Other Objective/Functional Measures:   BP: pre-treatment: 136/68    Increased sway and 1-2 LOB with balance interventions today.        Pain Level (0-10 scale) post treatment: 0/10    ASSESSMENT/Changes in Function:     Patient will continue to benefit from skilled PT services to modify and progress therapeutic interventions, address functional mobility deficits, address ROM deficits, address strength deficits, analyze and address soft tissue restrictions, analyze and cue movement patterns, analyze and modify body mechanics/ergonomics and assess and modify postural abnormalities to attain remaining goals. []  See Plan of Care   []  See progress note/recertification  []  See Discharge Summary         Progress towards goals / Updated goals:   Patient demonstrates improved tolerance for interventions and has made good progress with strength and balance interventions. Patient continues to have increased difficulty with eyes closed balance interventions and requires frequent rest breaks between exercises. Short Term Goals: To be accomplished in 6 weeks: Met  To demonstrate compliance with safe transfer technique  To demonstrated compliance with HEP  To increase LE strength by 1/2 ms grade tolerate standing for 30 minutes to cook meals independently   To improve Ziegler to medium fall risk so patient can get in/out shower and car  Long Term Goals:  To be accomplished in 12 weeks: Progressing  To improve LE strength by 1 ms grade so patient can stand/walk for 1 hour to grocery shop  To improve Ziegler score to low fall risk so patient can get to all Dr. Denice Medley independently  To improve lumbar ROM to [facility] so patient can bend to the floor to dress  Frequency / Duration: Patient to be seen 2 times per week for 12 weeks.     PLAN  [x]  Upgrade activities as tolerated     [x]  Continue plan of care  [x]  Update interventions per flow sheet       []  Discharge due to:_  []  Other:_          Fausto Dewey PTA                  7/17/2018  11:22 AM

## 2018-07-24 ENCOUNTER — HOSPITAL ENCOUNTER (OUTPATIENT)
Dept: PHYSICAL THERAPY | Age: 83
Discharge: HOME | End: 2018-07-24
Payer: MEDICARE

## 2018-07-24 PROCEDURE — 97110 THERAPEUTIC EXERCISES: CPT | Performed by: PHYSICAL THERAPIST

## 2018-07-24 NOTE — PROGRESS NOTES
PT DAILY TREATMENT NOTE - [facility] 2-15    Patient Name: Cristela Mcfarlane  Date:2018  : 1931  [x]  Patient  Verified  Payor: Dinora Landeros / Plan: [place] MEDICARE PART A & B / Product Type: Medicare /    In time:145  Out time:230  Total Treatment Time (min): 45  Total Timed Codes (min): 45  1:1 Treatment Time ( [address] only): 45   Visit #: 21     Treatment Area: Gait instability [R26.81]    SUBJECTIVE  Pain Level (0-10 scale): 0/10  Any medication changes, allergies to medications, adverse drug reactions, diagnosis change, or new procedure performed?: [x] No    [] Yes (see summary sheet for update)  Subjective functional status/changes:   [] No changes reported  I feel like this is making a big difference. I was able to walk around [place] over the weekend without difficulty or falling. OBJECTIVE        45 min Therapeutic Exercise:  [] See flow sheet :   Rationale: increase ROM, increase strength, improve coordination and improve balance to improve the patients ability to perform all ADL's and community outings without fall risk                  With   [] TE   [] TA   [] neuro   [] other: Patient Education: [x] Review HEP    [] Progressed/Changed HEP based on:   [] positioning   [] body mechanics   [] transfers   [] heat/ice application    [] other:      Other Objective/Functional Measures: BP pre and post TX: 145/74, 141/67     Pain Level (0-10 scale) post treatment: 0/10    ASSESSMENT/Changes in Function:     Patient will continue to benefit from skilled PT services to modify and progress therapeutic interventions, address functional mobility deficits, address ROM deficits, address strength deficits, analyze and address soft tissue restrictions, analyze and cue movement patterns, analyze and modify body mechanics/ergonomics, assess and modify postural abnormalities and address imbalance/dizziness to attain remaining goals.      []  See Plan of Care  []  See progress note/recertification  []  See Discharge Summary         Progress towards goals / Updated goals:  Patient continues to make steady progress towards all strength and balance goals    PLAN  []  Upgrade activities as tolerated     [x]  Continue plan of care  []  Update interventions per flow sheet       []  Discharge due to:_  []  Other:_      Alley Hernandez, PT 7/24/2018  1:45 PM

## 2018-07-31 ENCOUNTER — HOSPITAL ENCOUNTER (OUTPATIENT)
Dept: PHYSICAL THERAPY | Age: 83
Discharge: HOME | End: 2018-07-31
Payer: MEDICARE

## 2018-07-31 PROCEDURE — 97110 THERAPEUTIC EXERCISES: CPT | Performed by: PHYSICAL THERAPIST

## 2018-07-31 PROCEDURE — 97112 NEUROMUSCULAR REEDUCATION: CPT | Performed by: PHYSICAL THERAPIST

## 2018-07-31 NOTE — PROGRESS NOTES
PT DAILY TREATMENT NOTE - [facility] 2-15 Patient Name: Sera Jackson Date:2018 : 1931 [x]  Patient  Verified Payor: [place] MEDICARE / Plan: Ez Carbajal / Product Type: Medicare / In time:0900  Out time:1000 Total Treatment Time (min): 60 Total Timed Codes (min): 60 
1:1 Treatment Time ( [address] only): 60 Visit #: 10 Treatment Area: Gait instability [R26.81] SUBJECTIVE Pain Level (0-10 scale): 0/10 Any medication changes, allergies to medications, adverse drug reactions, diagnosis change, or new procedure performed?: [x] No    [] Yes (see summary sheet for update) Subjective functional status/changes:   [] No changes reported Patient reports that she feels good today, no changes in the past week OBJECTIVE 45 min Therapeutic Exercise:  [] See flow sheet :  
Rationale: increase ROM, increase strength, improve coordination and increase proprioception to improve the patients ability to perform ADLs without risk of fall 15 min Neuromuscular Re-education:  []  See flow sheet :  
Rationale: increase ROM, increase strength, improve coordination and improve balance  to improve the patients ability to perform community outings without fall risk With 
 [] TE 
 [] TA 
 [] neuro 
 [] other: Patient Education: [x] Review HEP [] Progressed/Changed HEP based on:  
[] positioning   [] body mechanics   [] transfers   [] heat/ice application   
[] other:   
 
Other Objective/Functional Measures: Progressed balance exercises per flow sheet. BP during Tx: 135/70 Pain Level (0-10 scale) post treatment: 0/10 ASSESSMENT/Changes in Function:  
 
Patient will continue to benefit from skilled PT services to modify and progress therapeutic interventions, address functional mobility deficits, address ROM deficits, address strength deficits, analyze and cue movement patterns, analyze and modify body mechanics/ergonomics, assess and modify postural abnormalities and address imbalance/dizziness to attain remaining goals. []  See Plan of Care 
[]  See progress note/recertification 
[]  See Discharge Summary Progress towards goals / Updated goals: 
Patient has made excellent progress towards all PT goals. She is going to attempt to d/c with HEP today. Will hold chart open for 2 weeks and call patient to check status and need for future PT. PLAN 
[]  Upgrade activities as tolerated     []  Continue plan of care 
[]  Update interventions per flow sheet [x]  Discharge due to:_met Goals 
[]  Other:_ Shaneka Pugh PT 7/31/2018  9:06 AM

## 2018-08-28 ENCOUNTER — APPOINTMENT (OUTPATIENT)
Dept: CT IMAGING | Age: 83
End: 2018-08-28
Attending: PHYSICIAN ASSISTANT
Payer: MEDICARE

## 2018-08-28 ENCOUNTER — HOSPITAL ENCOUNTER (OUTPATIENT)
Age: 83
Setting detail: OBSERVATION
Discharge: HOME | End: 2018-08-30
Attending: EMERGENCY MEDICINE | Admitting: INTERNAL MEDICINE
Payer: MEDICARE

## 2018-08-28 ENCOUNTER — APPOINTMENT (OUTPATIENT)
Dept: GENERAL RADIOLOGY | Age: 83
End: 2018-08-28
Attending: PHYSICIAN ASSISTANT
Payer: MEDICARE

## 2018-08-28 DIAGNOSIS — R44.3 HALLUCINATION: ICD-10-CM

## 2018-08-28 DIAGNOSIS — R41.0 CONFUSION: ICD-10-CM

## 2018-08-28 DIAGNOSIS — N39.0 URINARY TRACT INFECTION WITHOUT HEMATURIA, SITE UNSPECIFIED: Primary | ICD-10-CM

## 2018-08-28 LAB
ALBUMIN SERPL-MCNC: 3.7 G/DL (ref 3.5–5)
ALBUMIN/GLOB SERPL: 1.2 {RATIO} (ref 1.1–2.2)
ALP SERPL-CCNC: 90 U/L (ref 45–117)
ALT SERPL-CCNC: 12 U/L (ref 12–78)
ANION GAP SERPL CALC-SCNC: 8 MMOL/L (ref 5–15)
APPEARANCE UR: ABNORMAL
AST SERPL-CCNC: 14 U/L (ref 15–37)
BACTERIA URNS QL MICRO: NEGATIVE /HPF
BASOPHILS # BLD: 0.1 K/UL (ref 0–0.1)
BASOPHILS NFR BLD: 1 % (ref 0–1)
BILIRUB SERPL-MCNC: 0.9 MG/DL (ref 0.2–1)
BILIRUB UR QL CFM: NEGATIVE
BUN SERPL-MCNC: 23 MG/DL (ref 6–20)
BUN/CREAT SERPL: 22 (ref 12–20)
CALCIUM SERPL-MCNC: 8.8 MG/DL (ref 8.5–10.1)
CHLORIDE SERPL-SCNC: 108 MMOL/L (ref 97–108)
CO2 SERPL-SCNC: 24 MMOL/L (ref 21–32)
COLOR UR: ABNORMAL
COMMENT, HOLDF: NORMAL
COMMENT, HOLDF: NORMAL
CREAT SERPL-MCNC: 1.05 MG/DL (ref 0.55–1.02)
DIFFERENTIAL METHOD BLD: ABNORMAL
EOSINOPHIL # BLD: 0.1 K/UL (ref 0–0.4)
EOSINOPHIL NFR BLD: 1 % (ref 0–7)
EPITH CASTS URNS QL MICRO: ABNORMAL /LPF
ERYTHROCYTE [DISTWIDTH] IN BLOOD BY AUTOMATED COUNT: 13.1 % (ref 11.5–14.5)
GLOBULIN SER CALC-MCNC: 3.2 G/DL (ref 2–4)
GLUCOSE SERPL-MCNC: 102 MG/DL (ref 65–100)
GLUCOSE UR STRIP.AUTO-MCNC: NEGATIVE MG/DL
HCT VFR BLD AUTO: 46.1 % (ref 35–47)
HGB BLD-MCNC: 15.1 G/DL (ref 11.5–16)
HGB UR QL STRIP: NEGATIVE
HYALINE CASTS URNS QL MICRO: >20 /LPF (ref 0–5)
IMM GRANULOCYTES # BLD: 0 K/UL (ref 0–0.04)
IMM GRANULOCYTES NFR BLD AUTO: 0 % (ref 0–0.5)
KETONES UR QL STRIP.AUTO: 40 MG/DL
LEUKOCYTE ESTERASE UR QL STRIP.AUTO: ABNORMAL
LYMPHOCYTES # BLD: 1.6 K/UL (ref 0.8–3.5)
LYMPHOCYTES NFR BLD: 17 % (ref 12–49)
MCH RBC QN AUTO: 28.8 PG (ref 26–34)
MCHC RBC AUTO-ENTMCNC: 32.8 G/DL (ref 30–36.5)
MCV RBC AUTO: 88 FL (ref 80–99)
MONOCYTES # BLD: 1.2 K/UL (ref 0–1)
MONOCYTES NFR BLD: 12 % (ref 5–13)
MUCOUS THREADS URNS QL MICRO: ABNORMAL /LPF
NEUTS SEG # BLD: 6.6 K/UL (ref 1.8–8)
NEUTS SEG NFR BLD: 69 % (ref 32–75)
NITRITE UR QL STRIP.AUTO: NEGATIVE
NRBC # BLD: 0 K/UL (ref 0–0.01)
NRBC BLD-RTO: 0 PER 100 WBC
OTHER,OTHU: ABNORMAL
PH UR STRIP: 5.5 [PH] (ref 5–8)
PLATELET # BLD AUTO: 190 K/UL (ref 150–400)
PMV BLD AUTO: 12.2 FL (ref 8.9–12.9)
POTASSIUM SERPL-SCNC: 4.2 MMOL/L (ref 3.5–5.1)
PROT SERPL-MCNC: 6.9 G/DL (ref 6.4–8.2)
PROT UR STRIP-MCNC: ABNORMAL MG/DL
RBC # BLD AUTO: 5.24 M/UL (ref 3.8–5.2)
RBC #/AREA URNS HPF: ABNORMAL /HPF (ref 0–5)
SAMPLES BEING HELD,HOLD: NORMAL
SAMPLES BEING HELD,HOLD: NORMAL
SODIUM SERPL-SCNC: 140 MMOL/L (ref 136–145)
SP GR UR REFRACTOMETRY: 1.03 (ref 1–1.03)
TROPONIN I SERPL-MCNC: <0.05 NG/ML
UROBILINOGEN UR QL STRIP.AUTO: 1 EU/DL (ref 0.2–1)
WBC # BLD AUTO: 9.6 K/UL (ref 3.6–11)
WBC URNS QL MICRO: ABNORMAL /HPF (ref 0–4)

## 2018-08-28 PROCEDURE — 81001 URINALYSIS AUTO W/SCOPE: CPT | Performed by: EMERGENCY MEDICINE

## 2018-08-28 PROCEDURE — 36415 COLL VENOUS BLD VENIPUNCTURE: CPT | Performed by: PHYSICIAN ASSISTANT

## 2018-08-28 PROCEDURE — 93005 ELECTROCARDIOGRAM TRACING: CPT

## 2018-08-28 PROCEDURE — 51701 INSERT BLADDER CATHETER: CPT

## 2018-08-28 PROCEDURE — 99285 EMERGENCY DEPT VISIT HI MDM: CPT

## 2018-08-28 PROCEDURE — 71045 X-RAY EXAM CHEST 1 VIEW: CPT

## 2018-08-28 PROCEDURE — 70450 CT HEAD/BRAIN W/O DYE: CPT

## 2018-08-28 PROCEDURE — 80053 COMPREHEN METABOLIC PANEL: CPT | Performed by: PHYSICIAN ASSISTANT

## 2018-08-28 PROCEDURE — 87040 BLOOD CULTURE FOR BACTERIA: CPT | Performed by: PHYSICIAN ASSISTANT

## 2018-08-28 PROCEDURE — 84484 ASSAY OF TROPONIN QUANT: CPT | Performed by: PHYSICIAN ASSISTANT

## 2018-08-28 PROCEDURE — 85025 COMPLETE CBC W/AUTO DIFF WBC: CPT | Performed by: PHYSICIAN ASSISTANT

## 2018-08-28 RX ORDER — LATANOPROST 50 UG/ML
1 SOLUTION/ DROPS OPHTHALMIC
Status: DISCONTINUED | OUTPATIENT
Start: 2018-08-28 | End: 2018-08-30 | Stop reason: HOSPADM

## 2018-08-28 RX ORDER — SODIUM CHLORIDE 9 MG/ML
75 INJECTION, SOLUTION INTRAVENOUS CONTINUOUS
Status: DISCONTINUED | OUTPATIENT
Start: 2018-08-28 | End: 2018-08-30 | Stop reason: HOSPADM

## 2018-08-28 RX ORDER — ONDANSETRON 2 MG/ML
4 INJECTION INTRAMUSCULAR; INTRAVENOUS
Status: DISCONTINUED | OUTPATIENT
Start: 2018-08-28 | End: 2018-08-30 | Stop reason: HOSPADM

## 2018-08-28 RX ORDER — METOPROLOL SUCCINATE 50 MG/1
100 TABLET, EXTENDED RELEASE ORAL DAILY
Status: DISCONTINUED | OUTPATIENT
Start: 2018-08-29 | End: 2018-08-29

## 2018-08-28 RX ORDER — ACETAMINOPHEN 325 MG/1
650 TABLET ORAL
Status: DISCONTINUED | OUTPATIENT
Start: 2018-08-28 | End: 2018-08-30 | Stop reason: HOSPADM

## 2018-08-28 RX ORDER — ENOXAPARIN SODIUM 100 MG/ML
40 INJECTION SUBCUTANEOUS EVERY 24 HOURS
Status: DISCONTINUED | OUTPATIENT
Start: 2018-08-29 | End: 2018-08-30 | Stop reason: HOSPADM

## 2018-08-28 RX ORDER — DIPHENHYDRAMINE HCL 25 MG
25 CAPSULE ORAL
Status: DISCONTINUED | OUTPATIENT
Start: 2018-08-28 | End: 2018-08-30 | Stop reason: HOSPADM

## 2018-08-28 RX ORDER — MIRTAZAPINE 15 MG/1
15 TABLET, FILM COATED ORAL
Status: DISCONTINUED | OUTPATIENT
Start: 2018-08-29 | End: 2018-08-29

## 2018-08-28 NOTE — IP AVS SNAPSHOT
Sultana Valle 
 
 
 [address] [place] [address] 
[phone] Patient: Deena Armendariz MRN: [id number] A check christopher indicates which time of day the medication should be taken. My Medications START taking these medications Instructions Each Dose to Equal  
 Morning Noon Evening Bedtime OLANZapine 2.5 mg tablet Commonly known as:  ZyPREXA Your last dose was: Last dose received: 2.5 mg on 8/29/2018  9:12 PM  
   
 Take 1 Tab by mouth nightly. 2.5 mg  
    
   
   
   
  
  
CONTINUE taking these medications Instructions Each Dose to Equal  
 Morning Noon Evening Bedtime  
 acetaminophen 500 mg tablet Commonly known as:  TYLENOL Your last dose was: Last dose received: 650 mg on 8/30/2018  9:42 AM  
   
 Take 500 mg by mouth every eight (8) hours as needed for Pain. 500 mg  
    
   
   
   
  
 ALDACTONE 50 mg tablet Generic drug:  spironolactone Your last dose was:  Not given in hospital, please resume as reccommended by your doctor Take 50 mg by mouth every evening. 50 mg  
    
   
   
   
  
 mirtazapine 15 mg tablet Commonly known as:  Luis Dolheather Your last dose was: Last dose received: 15 mg on 8/29/2018  1:42 AM  
   
 1-2 tabs at bedtime prn REFRESH OP Your last dose was:  Not given in hospital, please resume as reccommended by your doctor Apply 1 Drop to eye as needed (dry eye). 1 Drop XALATAN 0.005 % ophthalmic solution Generic drug:  latanoprost  
Your last dose was: Last dose received: 1 Drop on 8/29/2018 11:29 PM  
   
 Administer 1 Drop to both eyes nightly. 1 Drop STOP taking these medications   
 bumetanide 0.5 mg tablet Commonly known as:  Merlene Nunez Where to Get Your Medications Information on where to get these meds will be given to you by the nurse or doctor. ! Ask your nurse or doctor about these medications OLANZapine 2.5 mg tablet

## 2018-08-28 NOTE — IP AVS SNAPSHOT
[facility] 
 
 
 [address] 
[phone] Patient: Sadiq Troncoso MRN: [id number] UIT:5/23[birth year]16 About your hospitalization You were admitted on:  August 28, 2018 You last received care in the:  [facility]  MED SURG 2 You were discharged on:  August 30, 2018 Why you were hospitalized Your primary diagnosis was:  Confusion Your diagnoses also included:  Depression, Djd (Degenerative Joint Disease), Lumbar, Esophageal Stricture, Essential Hypertension, Gerd (Gastroesophageal Reflux Disease), Glaucoma, Macular Degeneration, Taye (Obstructive Sleep Apnea), Psychophysiological Insomnia Follow-up Information Follow up With Details Comments Contact Info Gracy De Jesus MD   [place] [address] [address] [facility] [address] 
[phone] Your Scheduled Appointments Monday September 17, 2018 10:45 AM EDT ROUTINE CARE with Gracy De Jesus MD  
[facility] [address] [place] [address]  
[phone] Discharge Orders None A check christopher indicates which time of day the medication should be taken. My Medications START taking these medications Instructions Each Dose to Equal  
 Morning Noon Evening Bedtime OLANZapine 2.5 mg tablet Commonly known as:  ZyPREXA Your last dose was: Last dose received: 2.5 mg on 8/29/2018  9:12 PM  
   
 Take 1 Tab by mouth nightly. 2.5 mg  
    
   
   
   
  
  
CONTINUE taking these medications Instructions Each Dose to Equal  
 Morning Noon Evening Bedtime  
 acetaminophen 500 mg tablet Commonly known as:  TYLENOL Your last dose was: Last dose received: 650 mg on 8/30/2018  9:42 AM  
   
 Take 500 mg by mouth every eight (8) hours as needed for Pain. 500 mg  
    
   
   
   
  
 ALDACTONE 50 mg tablet Generic drug:  spironolactone Your last dose was:  Not given in hospital, please resume as reccommended by your doctor Take 50 mg by mouth every evening. 50 mg  
    
   
   
   
  
 mirtazapine 15 mg tablet Commonly known as:  Joe García Your last dose was: Last dose received: 15 mg on 8/29/2018  1:42 AM  
   
 1-2 tabs at bedtime prn REFRESH OP Your last dose was:  Not given in hospital, please resume as reccommended by your doctor Apply 1 Drop to eye as needed (dry eye). 1 Drop XALATAN 0.005 % ophthalmic solution Generic drug:  latanoprost  
Your last dose was: Last dose received: 1 Drop on 8/29/2018 11:29 PM  
   
 Administer 1 Drop to both eyes nightly. 1 Drop STOP taking these medications   
 bumetanide 0.5 mg tablet Commonly known as:  Severiano Bue Where to Get Your Medications Information on where to get these meds will be given to you by the nurse or doctor. ! Ask your nurse or doctor about these medications OLANZapine 2.5 mg tablet Discharge Instructions ACUTE DIAGNOSES: 
Confusion CHRONIC MEDICAL DIAGNOSES: 
Problem List as of 8/30/2018  Date Reviewed: 8/30/2018 Codes Class Noted - Resolved Glaucoma ICD-10-CM: H40.9 ICD-9-CM: 365.9  Unknown - Present Psychophysiological insomnia ICD-10-CM: F51.04 
ICD-9-CM: 307.42  12/27/2017 - Present Depression ICD-10-CM: F32.9 ICD-9-CM: 311  Unknown - Present Essential hypertension ICD-10-CM: I10 
ICD-9-CM: 401.9  3/11/2016 - Present GERD (gastroesophageal reflux disease) ICD-10-CM: K21.9 ICD-9-CM: 530.81  Unknown - Present Esophageal stricture ICD-10-CM: K22.2 ICD-9-CM: 530.3  Unknown - Present DJD (degenerative joint disease), lumbar ICD-10-CM: M47.816 ICD-9-CM: 721.3  Unknown - Present Macular degeneration ICD-10-CM: H35.30 ICD-9-CM: 362.50  Unknown - Present Overview Addendum 5/15/2012  2:03 PM by Christina Burns  
  macular degeneration vs optic neuropathy ABAD (obstructive sleep apnea) ICD-10-CM: C09.04 
ICD-9-CM: 327.23  8/14/2011 - Present RESOLVED: HTN (hypertension) ICD-10-CM: I10 
ICD-9-CM: 401.9  Unknown - 8/29/2018 * (Principal)RESOLVED: Confusion ICD-10-CM: R41.0 ICD-9-CM: 298.9  8/28/2018 - 8/30/2018 RESOLVED: Type 2 diabetes with nephropathy ([facility] ICD-10-CM: E11.21 
ICD-9-CM: 250.40, 583.81  5/30/2018 - 5/30/2018 RESOLVED: Type 2 diabetes mellitus with proliferative retinopathy ([facility] ICD-10-CM: F59.7073 ICD-9-CM: 250.50, 362.02  5/30/2018 - 5/30/2018 RESOLVED: Advanced care planning/counseling discussion ICD-10-CM: Z71.89 ICD-9-CM: V65.49  5/30/2018 - 8/29/2018 Overview Signed 5/30/2018 11:06 AM by Iban Watkins RN Patient states that a completed Advanced Medical Directive is at home. NN encouraged patient to bring a copy of the completed Advanced Medical Directive to the office for scanning into the medical record. Patient verbalized understanding & agreement. RESOLVED: Type 2 diabetes mellitus with proliferative retinopathy ([facility] ICD-10-CM: K50.5015 ICD-9-CM: 250.50, 362.02  4/11/2018 - 5/30/2018 RESOLVED: Type 2 diabetes mellitus with nephropathy ([facility] ICD-10-CM: E11.21 
ICD-9-CM: 250.40, 583.81  1/30/2018 - 5/30/2018 RESOLVED: Type 2 diabetes mellitus with diabetic retinopathy ([facility] ICD-10-CM: C86.497 ICD-9-CM: 250.50, 362.01  1/30/2018 - 5/30/2018 RESOLVED: Hyperkalemia ICD-10-CM: E87.5 ICD-9-CM: 276.7  1/30/2018 - 5/30/2018 RESOLVED: S/P laparoscopic cholecystectomy ICD-10-CM: Z90.49 ICD-9-CM: V45.89  8/1/2017 - 8/29/2018 RESOLVED: Gallstones ICD-10-CM: K80.20 ICD-9-CM: 574.20  7/31/2017 - 8/29/2018 RESOLVED: Diabetes mellitus without complication ([facility].) J-89-BV: E11.9 ICD-9-CM: 250.00  3/11/2016 - 5/30/2018 RESOLVED: Psychotic depression ([facility] 75.) ICD-10-CM: F32.3 ICD-9-CM: 296.20  12/21/2015 - 8/29/2018 RESOLVED: Chronic cough ICD-10-CM: R05 ICD-9-CM: 786.2  9/14/2015 - 8/29/2018 RESOLVED: DM (diabetes mellitus) ([facility].) ICD-10-CM: E11.9 ICD-9-CM: 250.00  9/13/2015 - 3/11/2016 Overview Signed 9/13/2015  6:24 AM by Pam Estrada 2015 A1C=7.0. RESOLVED: Diastolic dysfunction SPU-27-TX: I51.9 ICD-9-CM: 429.9  12/19/2012 - 8/29/2018 Overview Signed 12/19/2012 11:17 AM by Pam Estrada 2012 ECHO mild. RESOLVED: Osteopenia ICD-10-CM: M85.80 ICD-9-CM: 733.90  Unknown - 8/29/2018 RESOLVED: Iron deficiency anemia ICD-10-CM: D50.9 ICD-9-CM: 280.9  5/8/2012 - 8/29/2018 RESOLVED: Sinus node dysfunction (HCC) ICD-10-CM: I49.5 ICD-9-CM: 427.81  11/14/2011 - 8/29/2018 Overview Signed 11/14/2011  1:49 PM by Ryan Handy LPN  
  [id number] Rochelle SOMERS 
  
  
   
 RESOLVED: Venous insufficiency ICD-10-CM: G57.0 ICD-9-CM: 459.81  10/10/2011 - 8/29/2018 RESOLVED: HTN (hypertension) ICD-10-CM: I10 
ICD-9-CM: 401.9  8/14/2011 - 3/11/2016 Overview Signed 9/20/2011  9:55 AM by Ryan Handy LPN  
  1/[id number] 55-60%. Tech difficult study with poor acoustic window. Left atrium mildly dilated. RESOLVED: Palpitation ICD-10-CM: R00.2 ICD-9-CM: 785.1  8/14/2011 - 8/29/2018 Overview Signed 10/7/2011 11:23 AM by Ryan Handy LPN  
  68/[id number] 72%. With no ischemia. DISCHARGE MEDICATIONS:  
  
 
 
· It is important that you take the medication exactly as they are prescribed. · Keep your medication in the bottles provided by the pharmacist and keep a list of the medication names, dosages, and times to be taken in your wallet. · Do not take other medications without consulting your doctor. DIET:  Cardiac Diet ACTIVITY: Activity as tolerated ADDITIONAL INFORMATION: If you experience any of the following symptoms then please call your primary care physician or return to the emergency room if you cannot get hold of your doctor: Fever, chills, nausea, vomiting, diarrhea, change in mentation, falling, bleeding, shortness of breath. FOLLOW UP CARE: 
Dr. Tete Kaba MD  you are to call and set up an appointment to see them in 2 weeks. Follow-up with psychiatry, Nate Olmstead MD as needed [phone] Information obtained by : 
I understand that if any problems occur once I am at home I am to contact my physician. I understand and acknowledge receipt of the instructions indicated above. Physician's or R.N.'s Signature                                                                  Date/Time Patient or Representative Signature                                                          Date/Time 
 
 
 
ACO Transitions of Care Introducing [employer] [address] offers a voluntary care coordination program to provide high quality service and care to [facility] fee-for-service beneficiaries. Delia Ramirez was designed to help you enhance your health and well-being through the following services: ? Transitions of Care  support for individuals who are transitioning from one care setting to another (example: Hospital to home). ? Chronic and Complex Care Coordination  support for individuals and caregivers of those with serious or chronic illnesses or with more than one chronic (ongoing) condition and those who take a number of different medications. If you meet specific medical criteria, a [facility] [address] may call you directly to coordinate your care with your primary care physician and your other care providers. For questions about the [facility] programs, please, contact your physicians office. For general questions or additional information about Accountable Care Organizations: 
Please visit www.medicare.gov/acos. html or call 1-800-MEDICARE (8-582.899.2209) TTY users should call 6-917.954.1431. Prizzm Announcement We are excited to announce that we are making your provider's discharge notes available to you in Prizzm. You will see these notes when they are completed and signed by the physician that discharged you from your recent hospital stay. If you have any questions or concerns about any information you see in Prizzm, please call the Health Information Department where you were seen or reach out to your Primary Care Provider for more information about your plan of care. Introducing [facility]! [employer] introduces Prizzm patient portal. Now you can access parts of your medical record, email your doctor's office, and request medication refills online. 1. In your internet browser, go to [email or web address] 2. Click on the First Time User? Click Here link in the Sign In box. You will see the New Member Sign Up page. 3. Enter your Prizzm Access Code exactly as it appears below. You will not need to use this code after youve completed the sign-up process. If you do not sign up before the expiration date, you must request a new code. · Prizzm Access Code: [email or web address] Expires: 10/11/2018 10:51 AM 
 
4. Enter the last four digits of your Social Security Number (xxxx) and Date of Birth (mm/dd/yyyy) as indicated and click Submit. You will be taken to the next sign-up page. 5. Create a Prizzm ID.  This will be your Prizzm login ID and cannot be changed, so think of one that is secure and easy to remember. 6. Create a AlpineReplay password. You can change your password at any time. 7. Enter your Password Reset Question and Answer. This can be used at a later time if you forget your password. 8. Enter your e-mail address. You will receive e-mail notification when new information is available in [address] 9. Click Sign Up. You can now view and download portions of your medical record. 10. Click the Download Summary menu link to download a portable copy of your medical information. If you have questions, please visit the Frequently Asked Questions section of the [employer]pineReplay website. Remember, AlpineReplay is NOT to be used for urgent needs. For medical emergencies, dial 911. Now available from your iPhone and Android! Introducing Weston Garcias As a [employer] patient, I wanted to make you aware of our electronic visit tool called Weston Garcias. [employer] 24/7 allows you to connect within minutes with a medical provider 24 hours a day, seven days a week via a mobile device or tablet or logging into a secure website from your computer. You can access Weston Garcias from anywhere in the [place]. A virtual visit might be right for you when you have a simple condition and feel like you just dont want to get out of bed, or cant get away from work for an appointment, when your regular [employer] provider is not available (evenings, weekends or holidays), or when youre out of town and need minor care. Electronic visits cost only $49 and if the [employer] 24/7 provider determines a prescription is needed to treat your condition, one can be electronically transmitted to a nearby pharmacy*. Please take a moment to enroll today if you have not already done so. The enrollment process is free and takes just a few minutes.   To enroll, please download the [employer] 24/7 alok to your tablet or phone, or visit [email or web address] to enroll on your computer. And, as an [address] [place] [address] patient with a [employer] account, the results of your visits will be scanned into your electronic medical record and your primary care provider will be able to view the scanned results. We urge you to continue to see your regular Clarisa Lina provider for your ongoing medical care. And while your primary care provider may not be the one available when you seek a Manta virtual visit, the peace of mind you get from getting a real diagnosis real time can be priceless. For more information on Manta, view our Frequently Asked Questions (FAQs) at [email or web address]. Sincerely, 
 
Ann-Marie Saucedo MD 
Chief Medical Officer [address] *:  certain medications cannot be prescribed via Manta Unresulted Labs-Please follow up with your PCP about these lab tests Order Current Status CULTURE, BLOOD Preliminary result Providers Seen During Your Hospitalization Provider Specialty Primary office phone Selam Young, [address] [place] [address] Emergency Medicine [phone] Saadia Tai MD Internal Medicine [phone] Catherine Huertas MD Hospitalist [phone] Your Primary Care Physician (PCP) Primary Care Physician Office Phone Office Fax [facility] [phone] You are allergic to the following Allergen Reactions Meperidine Hives Other reaction(s): Hives Ace Inhibitors Unknown (comments) Aldactone (Spironolactone) Other (comments)  
 hyperkalemia Codeine Unknown (comments) Miralax (Polyethylene Glycol 3350) Nausea and Vomiting Other Plant, Animal, Environmental Rash Bilateral Hearing Aids cause severe reaction per pt report Percocet (Oxycodone-Acetaminophen) Unknown (comments) Statins-Hmg-Coa Reductase Inhibitors Unknown (comments) Sular (Nisoldipine) Rash Zetia (Ezetimibe) Unknown (comments) Recent Documentation Height Weight BMI OB Status Smoking Status 1.549 m 78.5 kg 32.69 kg/m2 Hysterectomy Never Smoker Emergency Contacts Name Discharge Info Relation Home Work Mobile Dalia Newman DISCHARGE CAREGIVER [3] Child [2] [phone] Patient Belongings The following personal items are in your possession at time of discharge: 
  Dental Appliances: None  Visual Aid: None      Home Medications: None   Jewelry: None  Clothing: Shirt, Pants, With patient, Undergarments    Other Valuables: Eyeglasses, At bedside Please provide this summary of care documentation to your next provider. Signatures-by signing, you are acknowledging that this After Visit Summary has been reviewed with you and you have received a copy. Patient Signature:  ____________________________________________________________ Date:  ____________________________________________________________  
  
Justice Phelan Provider Signature:  ____________________________________________________________ Date:  ____________________________________________________________

## 2018-08-29 PROBLEM — K80.20 GALLSTONES: Status: RESOLVED | Noted: 2017-07-31 | Resolved: 2018-08-29

## 2018-08-29 PROBLEM — Z71.89 ADVANCED CARE PLANNING/COUNSELING DISCUSSION: Status: RESOLVED | Noted: 2018-05-30 | Resolved: 2018-08-29

## 2018-08-29 PROBLEM — Z90.49 S/P LAPAROSCOPIC CHOLECYSTECTOMY: Status: RESOLVED | Noted: 2017-08-01 | Resolved: 2018-08-29

## 2018-08-29 LAB
APPEARANCE UR: CLEAR
ATRIAL RATE: 64 BPM
BACTERIA URNS QL MICRO: NEGATIVE /HPF
BILIRUB UR QL: NEGATIVE
CALCULATED P AXIS, ECG09: 118 DEGREES
CALCULATED R AXIS, ECG10: -45 DEGREES
CALCULATED T AXIS, ECG11: -2 DEGREES
COLOR UR: ABNORMAL
DIAGNOSIS, 93000: NORMAL
EPITH CASTS URNS QL MICRO: ABNORMAL /LPF
GLUCOSE UR STRIP.AUTO-MCNC: NEGATIVE MG/DL
HGB UR QL STRIP: NEGATIVE
HYALINE CASTS URNS QL MICRO: ABNORMAL /LPF (ref 0–5)
KETONES UR QL STRIP.AUTO: ABNORMAL MG/DL
LEUKOCYTE ESTERASE UR QL STRIP.AUTO: NEGATIVE
NITRITE UR QL STRIP.AUTO: NEGATIVE
P-R INTERVAL, ECG05: 236 MS
PH UR STRIP: 6 [PH] (ref 5–8)
PROT UR STRIP-MCNC: NEGATIVE MG/DL
Q-T INTERVAL, ECG07: 404 MS
QRS DURATION, ECG06: 70 MS
QTC CALCULATION (BEZET), ECG08: 448 MS
RBC #/AREA URNS HPF: ABNORMAL /HPF (ref 0–5)
SP GR UR REFRACTOMETRY: 1.01 (ref 1–1.03)
UROBILINOGEN UR QL STRIP.AUTO: 1 EU/DL (ref 0.2–1)
VENTRICULAR RATE, ECG03: 74 BPM
WBC URNS QL MICRO: ABNORMAL /HPF (ref 0–4)

## 2018-08-29 PROCEDURE — 99218 HC RM OBSERVATION: CPT

## 2018-08-29 PROCEDURE — 74011250636 HC RX REV CODE- 250/636: Performed by: INTERNAL MEDICINE

## 2018-08-29 PROCEDURE — 97161 PT EVAL LOW COMPLEX 20 MIN: CPT | Performed by: PHYSICAL THERAPIST

## 2018-08-29 PROCEDURE — 87086 URINE CULTURE/COLONY COUNT: CPT | Performed by: EMERGENCY MEDICINE

## 2018-08-29 PROCEDURE — 97535 SELF CARE MNGMENT TRAINING: CPT

## 2018-08-29 PROCEDURE — 77030011943

## 2018-08-29 PROCEDURE — 96360 HYDRATION IV INFUSION INIT: CPT

## 2018-08-29 PROCEDURE — G8987 SELF CARE CURRENT STATUS: HCPCS | Performed by: PHYSICAL THERAPIST

## 2018-08-29 PROCEDURE — 96361 HYDRATE IV INFUSION ADD-ON: CPT

## 2018-08-29 PROCEDURE — 97165 OT EVAL LOW COMPLEX 30 MIN: CPT

## 2018-08-29 PROCEDURE — G8978 MOBILITY CURRENT STATUS: HCPCS | Performed by: PHYSICAL THERAPIST

## 2018-08-29 PROCEDURE — G8988 SELF CARE GOAL STATUS: HCPCS | Performed by: PHYSICAL THERAPIST

## 2018-08-29 PROCEDURE — G8979 MOBILITY GOAL STATUS: HCPCS | Performed by: PHYSICAL THERAPIST

## 2018-08-29 PROCEDURE — 81001 URINALYSIS AUTO W/SCOPE: CPT | Performed by: EMERGENCY MEDICINE

## 2018-08-29 PROCEDURE — 74011250637 HC RX REV CODE- 250/637: Performed by: INTERNAL MEDICINE

## 2018-08-29 PROCEDURE — 97530 THERAPEUTIC ACTIVITIES: CPT | Performed by: PHYSICAL THERAPIST

## 2018-08-29 PROCEDURE — 96372 THER/PROPH/DIAG INJ SC/IM: CPT

## 2018-08-29 PROCEDURE — 74011250637 HC RX REV CODE- 250/637: Performed by: PSYCHIATRY & NEUROLOGY

## 2018-08-29 PROCEDURE — 74011000250 HC RX REV CODE- 250: Performed by: INTERNAL MEDICINE

## 2018-08-29 RX ORDER — SPIRONOLACTONE 50 MG/1
50 TABLET, FILM COATED ORAL EVERY EVENING
COMMUNITY
End: 2018-09-17 | Stop reason: SDUPTHER

## 2018-08-29 RX ORDER — BUMETANIDE 0.5 MG/1
0.25 TABLET ORAL DAILY
COMMUNITY
End: 2018-08-30

## 2018-08-29 RX ORDER — ACETAMINOPHEN 500 MG
500 TABLET ORAL
Status: DISCONTINUED | OUTPATIENT
Start: 2018-08-29 | End: 2018-08-30 | Stop reason: HOSPADM

## 2018-08-29 RX ORDER — OLANZAPINE 2.5 MG/1
2.5 TABLET ORAL
Status: DISCONTINUED | OUTPATIENT
Start: 2018-08-29 | End: 2018-08-30 | Stop reason: HOSPADM

## 2018-08-29 RX ORDER — ACETAMINOPHEN 500 MG
500 TABLET ORAL
COMMUNITY
End: 2019-10-28

## 2018-08-29 RX ADMIN — ACETAMINOPHEN 650 MG: 325 TABLET ORAL at 01:42

## 2018-08-29 RX ADMIN — LATANOPROST 1 DROP: 50 SOLUTION OPHTHALMIC at 23:29

## 2018-08-29 RX ADMIN — ENOXAPARIN SODIUM 40 MG: 40 INJECTION, SOLUTION INTRAVENOUS; SUBCUTANEOUS at 09:29

## 2018-08-29 RX ADMIN — SODIUM CHLORIDE 75 ML/HR: 900 INJECTION, SOLUTION INTRAVENOUS at 00:18

## 2018-08-29 RX ADMIN — OLANZAPINE 2.5 MG: 2.5 TABLET, FILM COATED ORAL at 21:12

## 2018-08-29 RX ADMIN — LATANOPROST 1 DROP: 50 SOLUTION OPHTHALMIC at 01:42

## 2018-08-29 RX ADMIN — MIRTAZAPINE 15 MG: 15 TABLET, FILM COATED ORAL at 01:42

## 2018-08-29 RX ADMIN — SODIUM CHLORIDE 75 ML/HR: 900 INJECTION, SOLUTION INTRAVENOUS at 13:32

## 2018-08-29 NOTE — PROGRESS NOTES
[facility]: MED RECONCILIATION Comments/Recommendations:  
Patient is awake and alert. Pharmacist reviewed prescription refill history with Rx Query. The patient is able to confirm her home medications with prompting from her prescription refill history and the medication list from Dr. Pari Marie office. The patient reports taking bumetanide one half tablet daily but every few days she stops taking it for three to four days due to leg cramps. Medications added:  
 
· acetaminophen Medications removed: · Clobetasol · Metoprolol succinate Medications adjusted: 
 
· Spironolactone changed to 50 mg every evening Allergies: Meperidine; Ace inhibitors; Aldactone [spironolactone]; Codeine; Miralax [polyethylene glycol 3350]; Other plant, animal, environmental; Percocet [oxycodone-acetaminophen]; Statins-hmg-coa reductase inhibitors; Sular [nisoldipine]; and Zetia [ezetimibe] Prior to Admission Medications:  
Prior to Admission Medications Prescriptions Last Dose Informant Patient Reported? Taking? CARBOXYMETHYLCELLULOSE SODIUM (REFRESH OP)  Self Yes Yes Sig: Apply 1 Drop to eye as needed (dry eye). acetaminophen (TYLENOL) 500 mg tablet  Self Yes Yes Sig: Take 500 mg by mouth every eight (8) hours as needed for Pain. bumetanide (BUMEX) 0.5 mg tablet 2018 Self Yes Yes Sig: Take 0.25 mg by mouth daily. latanoprost (XALATAN) 0.005 % ophthalmic solution 2018 at Unknown time Self Yes Yes Sig: Administer 1 Drop to both eyes nightly. mirtazapine (REMERON) 15 mg tablet 2018 Self No Yes Si-2 tabs at bedtime prn  
spironolactone (ALDACTONE) 50 mg tablet 2018 Self Yes Yes Sig: Take 50 mg by mouth every evening. Facility-Administered Medications: None Thank you, Ping Tirado, PharmD, BCPS

## 2018-08-29 NOTE — CONSULTS
Newton Perkins MD  [phone]                         IDENTIFICATION:    Patient Name  Sadiq Troncoso   Date of Birth 1931   [facility] [id number]   Medical Record Number  [id number]      Age  80 y.o. PCP Gracy De Jeuss MD   Admit date:  2018    Room Number  524/01  @ [facility]   Date of Service  2018            HISTORY         REASON FOR CONSULTATION:  \"hallucinations\". HISTORY OF PRESENT ILLNESS:      The patient Sadiq Troncoso is a 80 y.o.  female with a past psychiatric history of depression and chronic insomnia, who presented for the principle diagnosis of Confusion. Patient reports/evidences the following emotional symptoms: Vivid visual hallucinations and paranoia. . The patient was seen in presence of daughter Aurelio Jackson who tells me that, pt has been having visual hallucination for about 6 years on and off. She sees people in her room. She is trying to keep house locked, putting furniture to block the door and very afraid of getting hurt. She has been on various antipsychotics including seroquel by previous PCPs but stops taking them by herself. Pt is very hard of hearing. Also has Macular degeneration and Glaucoma. Today the pt admits to the reports from dtr. Says the people she sees appear very real. No significant memory problems per dtr. Hallucinations mostly occur when she is waking up from sleep. Has passive death wishes at times in the past but denies it now. Also had some depression in past after   but denies it now. Also has chronic insomnia. Lives with her dtr but has own apartment on the property. There is no history of suicide attempts. no history of manic symptoms. no previous psych hospitalizations.       ALLERGIES:  Allergies   Allergen Reactions    Meperidine Hives     Other reaction(s): Hives    Ace Inhibitors Unknown (comments)    Aldactone [Spironolactone] Other (comments)     hyperkalemia    Codeine Unknown (comments)    Miralax [Polyethylene Glycol 3350] Nausea and Vomiting    Other Plant, Animal, Environmental Rash     Bilateral Hearing Aids cause severe reaction per pt report    Percocet [Oxycodone-Acetaminophen] Unknown (comments)    Statins-Hmg-Coa Reductase Inhibitors Unknown (comments)    Sular [Nisoldipine] Rash    Zetia [Ezetimibe] Unknown (comments)      MEDICATIONS PRIOR TO ADMISSION:  Prescriptions Prior to Admission   Medication Sig    bumetanide (BUMEX) 0.5 mg tablet Take 0.25 mg by mouth daily.  spironolactone (ALDACTONE) 50 mg tablet Take 50 mg by mouth every evening.  acetaminophen (TYLENOL) 500 mg tablet Take 500 mg by mouth every eight (8) hours as needed for Pain.  mirtazapine (REMERON) 15 mg tablet 1-2 tabs at bedtime prn    CARBOXYMETHYLCELLULOSE SODIUM (REFRESH OP) Apply 1 Drop to eye as needed (dry eye).  latanoprost (XALATAN) 0.005 % ophthalmic solution Administer 1 Drop to both eyes nightly.       PAST MEDICAL HISTORY:  Active Ambulatory Problems     Diagnosis Date Noted    ABAD (obstructive sleep apnea) 08/14/2011    GERD (gastroesophageal reflux disease)     Esophageal stricture     DJD (degenerative joint disease), lumbar     Macular degeneration     Essential hypertension 03/11/2016    Depression     Psychophysiological insomnia 12/27/2017     Resolved Ambulatory Problems     Diagnosis Date Noted    HTN (hypertension) 08/14/2011    Palpitation 08/14/2011    Venous insufficiency 10/10/2011    Sinus node dysfunction ([address] 11/14/2011    Iron deficiency anemia 05/08/2012    Osteopenia     Diastolic dysfunction 19/13/3795    DM (diabetes mellitus) ([address] 09/13/2015    Chronic cough 09/14/2015    Psychotic depression ([address] 12/21/2015    Diabetes mellitus without complication ([address] 87/76/5005    Gallstones 07/31/2017    S/P laparoscopic cholecystectomy 08/01/2017    Type 2 diabetes mellitus with nephropathy ([address] 01/30/2018    Type 2 diabetes mellitus with diabetic retinopathy ([facility] 01/30/2018    Hyperkalemia 01/30/2018    Type 2 diabetes mellitus with proliferative retinopathy ([facility] 04/11/2018    Type 2 diabetes with nephropathy ([facility] 05/30/2018    Type 2 diabetes mellitus with proliferative retinopathy ([facility] 05/30/2018    Advanced care planning/counseling discussion 05/30/2018    HTN (hypertension)      Past Medical History:   Diagnosis Date    Depression     DJD (degenerative joint disease), lumbar     GERD (gastroesophageal reflux disease)     Glaucoma     [place] (hard of hearing)     HTN (hypertension)     Keratoconjunctivitis     Macular degeneration     ABAD (obstructive sleep apnea)     Osteopenia     Venous insufficiency 10/10/2011      Past Medical History:   Diagnosis Date    Depression     \"better since moving into my daughters mother-in-law suite\"    DJD (degenerative joint disease), lumbar     GERD (gastroesophageal reflux disease)     stricture.  Glaucoma     [place] (hard of hearing)     HTN (hypertension)     Keratoconjunctivitis     sicca    Macular degeneration     macular degeneration vs ooptic neuropathy    ABAD (obstructive sleep apnea)     decided not to use d/t frequent bathroom trips at night.  didn't help    Osteopenia     Venous insufficiency 10/10/2011     Past Surgical History:   Procedure Laterality Date    BREAST SURGERY PROCEDURE UNLISTED  30 yrs ago    Breast Lumpectomy (side?)    EGD  2004    HX CHOLECYSTECTOMY  07/2018    HX COLONOSCOPY  2004    HX ENDOSCOPY  2004    HX HYSTERECTOMY      HX ORTHOPAEDIC  2005    cyst/tumor left hip    HX ORTHOPAEDIC  2014    Right Total Hip    Dr. Patel Marker J-W    HX PACEMAKER  11/4/2011    [employer] Rochelle INGRAMT    HX PACEMAKER      STRESS TEST LEXISCAN/CARDIOLITE  10/3/11    normal perfusion, EF 72%      SOCIAL HISTORY:    Social History     Social History Narrative     Social History   Substance Use Topics    Smoking status: Never Smoker    Smokeless tobacco: Never Used    Alcohol use No      FAMILY HISTORY:  History reviewed. Family History   Problem Relation Age of Onset    Heart Failure Mother     Psychiatric Disorder Mother     Heart Disease Father     Stroke Brother     Psychiatric Disorder Other      aunt       REVIEW of SYSTEMS:   As noted in history of present illness           MENTAL STATUS EXAM & VITALS     Oriented to person place month, year and President. Hard of hearing. Can spell WORLD forward and  backwards\" Memory (Registration 3/3, Recall 2/3, Mood: ok. Affect: Constricted. Normal speech. Denies SI/HI. no delusions. no hallucinations at present. Thought process logical and goal directed. Concentration limited. Insight/judgement Fair.              VITALS:     Patient Vitals for the past 24 hrs:   Temp Pulse Resp BP SpO2   08/29/18 1446 98.5 °F (36.9 °C) (!) 45 16 117/63 95 %   08/29/18 1327 97.9 °F (36.6 °C) (!) 41 17 149/82 96 %   08/29/18 0929 - (!) 47 - - 95 %   08/29/18 0909 98.1 °F (36.7 °C) (!) 44 16 114/58 94 %   08/29/18 0500 97.8 °F (36.6 °C) 62 18 112/50 93 %   08/29/18 0110 97.5 °F (36.4 °C) 60 18 - 95 %   08/29/18 0000 - 74 - 157/63 91 %   08/28/18 2345 - 73 - 146/58 93 %   08/28/18 2315 - 77 - 160/63 94 %   08/28/18 2304 - 75 - - 95 %   08/28/18 2303 - - - 166/79 -   08/28/18 2230 - 73 - 156/63 94 %   08/28/18 2215 - 73 - 137/65 92 %   08/28/18 2115 - - - 152/62 93 %   08/28/18 2054 98.3 °F (36.8 °C) 67 20 189/83 94 %              DATA     Labs reviewed    MEDICATIONS       ALL MEDICATIONS  Current Facility-Administered Medications   Medication Dose Route Frequency    latanoprost (XALATAN) 0.005 % ophthalmic solution 1 Drop  1 Drop Both Eyes QHS    mirtazapine (REMERON) tablet 15 mg  15 mg Oral QHS    0.9% sodium chloride infusion  75 mL/hr IntraVENous CONTINUOUS    acetaminophen (TYLENOL) tablet 650 mg  650 mg Oral Q4H PRN    diphenhydrAMINE (BENADRYL) capsule 25 mg  25 mg Oral Q4H PRN    ondansetron (ZOFRAN) injection 4 mg  4 mg IntraVENous Q4H PRN    enoxaparin (LOVENOX) injection 40 mg  40 mg SubCUTAneous Q24H      SCHEDULED MEDICATIONS  Current Facility-Administered Medications   Medication Dose Route Frequency    latanoprost (XALATAN) 0.005 % ophthalmic solution 1 Drop  1 Drop Both Eyes QHS    mirtazapine (REMERON) tablet 15 mg  15 mg Oral QHS    0.9% sodium chloride infusion  75 mL/hr IntraVENous CONTINUOUS    enoxaparin (LOVENOX) injection 40 mg  40 mg SubCUTAneous Q24H                ASSESSMENT & PLAN          Case d/w nursing staff and hx obtained/plan reviewed with pt who gave verbal informed consent for treatment with medications as noted below. The patient Vipul Reid is a 80 y.o.  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 Add Zyprexa 2.5 mg qhs.   2. Supportive care    Can be discharged home from psych stand point and can follow up as outpatient with a psychiatrist.    Will follow patient's course along with you as necessary. Thank you for the opportunity to participate in the care of your patient.                         SIGNED:    Jennifer Allred MD  8/29/2018

## 2018-08-29 NOTE — PROGRESS NOTES
Advance Care Planning Note Name: Lora Winston YOB: 1931 MRN: [id number] Admission Date: 8/28/2018  8:56 PM 
 
Date of discussion: 8/29/2018 Active Diagnoses: 
 
Hospital Problems  Date Reviewed: 8/29/2018 Codes Class Noted POA Glaucoma * (Principal)Confusion Psychophysiological insomnia Depression Essential hypertension GERD (gastroesophageal reflux disease) Esophageal stricture DJD (degenerative joint disease), lumbar Macular degeneration ABAD (obstructive sleep apnea) These active diagnoses are of sufficient risk that focused discussion on advance care planning is indicated in order to allow the patient to thoughtfully consider personal goals of care, and if situations arise that prevent the ability to personally give input, to ensure appropriate representation of their personal desires for different levels and aggressiveness of care. Discussion:  
 
Persons present and participating in discussion: Florentin Villegas MD, 2 daughter and grand daughter Discussion: discussed hallucinations diagnosis, treatment and prognosis. Daughter has MPOA at home. They have AD in place. Patient is DNR Time Spent:  
 
Total time spent face-to-face in education and discussion: 20 minutes. Florentin Ortega MD 
8/29/2018 12:41 AM

## 2018-08-29 NOTE — ED NOTES
TRANSFER - OUT REPORT: 
 
Verbal report given to Tamara Henderson (name) on Bill Leblanc  being transferred to [address] [place] [address]  (unit) for routine progression of care Report consisted of patients Situation, Background, Assessment and  
Recommendations(SBAR). Information from the following report(s) SBAR, ED Summary, [facility] and Recent Results was reviewed with the receiving nurse. Lines:  
Peripheral IV 08/28/18 Right Arm (Active) Site Assessment Clean, dry, & intact 8/28/2018 10:01 PM  
Phlebitis Assessment 0 8/28/2018 10:01 PM  
Infiltration Assessment 0 8/28/2018 10:01 PM  
Dressing Status Clean, dry, & intact 8/28/2018 10:01 PM  
Dressing Type Tape 8/28/2018 10:01 PM  
Hub Color/Line Status Patent 8/28/2018 10:01 PM  
  
 
Opportunity for questions and clarification was provided. Patient transported with: 
 Registered Nurse

## 2018-08-29 NOTE — PROGRESS NOTES
6124: Made MD Pinto aware of HR in 40's. Metoprolol held. No new orders at this time. 1152: Notified MD Lizz Kirby of MD Caldwell's recommendations: no psych eval needed, & to f/u w/ eye specialist. 
1930: Bedside and Verbal shift change report given to The Janina (oncoming nurse) by Carrol Garsia RN (offgoing nurse). Report included the following information SBAR, Kardex, Intake/Output and Recent Results.

## 2018-08-29 NOTE — H&P
[facility] Hospitalist Admission Note NAME:  Alannah Landeros :   1931 MRN:  [id number] PCP:  Enio Lewis MD  
 
Date/Time:  2018 12:00 AM 
 
  
  
Subjective: CHIEF COMPLAINT: hallucinations HISTORY OF PRESENT ILLNESS:    
Ms. Michelle Choi is a 80 y.o.  female who presented to the Emergency Department complaining of hallucinations. These are recurrent, and worse in the last few weeks. She only intermittently takes her meds. Family provides all hx. ER workup was normal.  We will admit her for observation. Past Medical History:  
Diagnosis Date  Depression \"better since moving into my daughters mother-in-law suite\"  DJD (degenerative joint disease), lumbar  GERD (gastroesophageal reflux disease)   
 stricture.  Glaucoma   
 [place] (hard of hearing)  HTN (hypertension)  Keratoconjunctivitis   
 sicca  Macular degeneration   
 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 didn't help  Osteopenia  Venous insufficiency 10/10/2011 Past Surgical History:  
Procedure Laterality Date  BREAST SURGERY PROCEDURE UNLISTED  30 yrs ago Breast Lumpectomy (side?)  EGD    HX CHOLECYSTECTOMY  2018  HX COLONOSCOPY    HX ENDOSCOPY    HX HYSTERECTOMY  HX ORTHOPAEDIC  2005  
 cyst/tumor left hip  HX ORTHOPAEDIC   Right Total Hip    Dr. Markell AVILA  
 HX PACEMAKER  2011 Keila ZAMORA  
 HX PACEMAKER    
 STRESS TEST LEXISCAN/CARDIOLITE  10/3/11  
 normal perfusion, EF 72% Social History Substance Use Topics  Smoking status: Never Smoker  Smokeless tobacco: Never Used  Alcohol use No  
  
 
Family History Problem Relation Age of Onset  Heart Failure Mother  Psychiatric Disorder Mother  Heart Disease Father  Stroke Brother  Psychiatric Disorder Other   
  aunt Allergies Allergen Reactions  Meperidine Hives Other reaction(s): Hives  Ace Inhibitors Unknown (comments)  Aldactone [Spironolactone] Other (comments)  
  hyperkalemia  Codeine Unknown (comments)  Miralax [Polyethylene Glycol 3350] Nausea and Vomiting  Other Plant, Animal, Environmental Rash Bilateral Hearing Aids cause severe reaction per pt report  Percocet [Oxycodone-Acetaminophen] Unknown (comments)  Statins-Hmg-Coa Reductase Inhibitors Unknown (comments)  Sular [Nisoldipine] Rash  Zetia [Ezetimibe] Unknown (comments) Prior to Admission medications Medication Sig Start Date End Date Taking? Authorizing Provider  
spironolactone (ALDACTONE) 50 mg tablet TAKE 1 TABLET BY MOUTH EVERY DAY 7/23/18   Ranjeet Hong MD  
latanoprost (XALATAN) 0.005 % ophthalmic solution Apply  to eye. Historical Provider  
spironolactone (ALDACTONE) 50 mg tablet 1 tab in am and 1/2 tab in pm 6/26/18   Ranjeet Hong MD  
mirtazapine (REMERON) 15 mg tablet 1-2 tabs at bedtime prn 6/26/18   Ranjeet Hong MD  
metoprolol succinate (TOPROL-XL) 100 mg tablet Take  by mouth daily. Historical Provider CARBOXYMETHYLCELLULOSE SODIUM (REFRESH OP) Apply  to eye daily. Historical Provider  
bumetanide (BUMEX) 0.5 mg tablet Take 1 Tab by mouth daily. Patient taking differently: Take 0.5 mg by mouth daily. 1/2 Tablet every other day 1/30/18   Alanna Dowd MD  
clobetasol (OLUX) 0.05 % topical foam APPLY 1 APPLICATION TO AFFECTED AREA TWICE A DAY EXTERNALLY TO EARS 7 DAYS, AS NEEDED FOR FLARES 12/14/17   Historical Provider  
latanoprost (XALATAN) 0.005 % ophthalmic solution Administer 1 Drop to both eyes nightly. Historical Provider Review of Systems: 
(bold if positive, if negative) Gen:  Eyes:  ENT:  CVS:  Pulm:  GI:   
:   
MS:  Skin:  Psych:  Endo:   
Hem:  Renal:   
Neuro:    
  
Objective: VITALS:   
 Vital signs reviewed; most recent are: 
 
Visit Vitals  /63  Pulse 74  Temp 98.3 °F (36.8 °C)  Resp 20  
 Ht 5' 1\" (1.549 m)  Wt 78.5 kg (173 lb)  SpO2 91%  BMI 32.69 kg/m2 SpO2 Readings from Last 6 Encounters:  
08/29/18 91% 06/26/18 95% 05/30/18 96% 04/25/18 95% 04/11/18 96% 01/30/18 97% No intake or output data in the 24 hours ending 08/29/18 0040 Exam:  
 
Physical Exam: 
 
Gen:  Obese, in no acute distress HEENT:  Pink conjunctivae, PERRL, hearing intact to voice, moist mucous membranes Neck:  Supple, without masses, thyroid non-tender Resp:  No accessory muscle use, clear breath sounds without wheezes rales or rhonchi 
Card:  No murmurs, normal S1, S2 without thrills, bruits or peripheral edema Abd:  Soft, non-tender, non-distended, normoactive bowel sounds are present, no mass Lymph:  No cervical or inguinal adenopathy Musc:  No cyanosis or clubbing Skin:  No rashes or ulcers, skin turgor is good Neuro:  Cranial nerves are grossly intact, general motor weakness, follows commands vaguely Psych:  Poor insight, oriented to person, Labs: 
 
Recent Labs  
   08/28/18 2201 WBC  9.6 HGB  15.1 HCT  46.1 PLT  190 Recent Labs  
   08/28/18 2201 NA  140  
K  4.2 CL  108 CO2  24 GLU  102* BUN  23* CREA  1.05* CA  8.8 ALB  3.7 TBILI  0.9 SGOT  14* ALT  12 Lab Results Component Value Date/Time Glucose (POC) 166 (H) 05/26/2010 10:16 AM  
 
No results for input(s): PH, PCO2, PO2, HCO3, FIO2 in the last 72 hours. No results for input(s): INR in the last 72 hours. No lab exists for component: INREXT, INREXT All Micro Results Procedure Component Value Units Date/Time CULTURE, URINE [[id number]] Collected:  08/28/18 2310 Order Status:  Completed Specimen:  Urine from Cath Urine Updated:  08/29/18 9417 CULTURE, BLOOD [[id number]] Collected:  08/28/18 2202 Order Status:  Completed Specimen:  Blood Updated:  08/29/18 0028 CULTURE, URINE [[id number]] Order Status:  Canceled Specimen:  Urine from Clean catch CULTURE, BLOOD [[id number]] Order Status:  Canceled Specimen:  Blood CULTURE, URINE [[id number]] Order Status:  Canceled Specimen:  Urine from Clean catch URINE CULTURE HOLD SAMPLE [[id number]] Order Status:  Canceled Specimen:  Urine URINE CULTURE HOLD SAMPLE [[id number]] Collected:  08/28/18 2157 Order Status:  Canceled Specimen:  Urine I have reviewed previous records Assessment and Plan:  
  
Confusion /  Depression / Psychophysiological insomnia - POA, unclear etiology. Suspect dementia or psychiatric. Dehydration from diuretics or visual lmiitations may contribute. Consult neurology and psychiatry. Sitter if needed. continue usual remeron. Check UA, first sample contaminated ABAD (obstructive sleep apnea) - Resume home CPA if available. Essential hypertension - Hold bumex and spironolactone and hydrate. Continue metoprolol. ECHO in 2011 does not give strong support to Dx of diastolic CHF. GERD (gastroesophageal reflux disease) / Esophageal stricture - PPI if symptoms DJD (degenerative joint disease), lumbar - Tylenol prn Macular degeneration / Glaucoma - Xalatan Telemetry reviewed:   normal sinus rhythm Risk of deterioration: high Total time spent with patient: 70 Minutes Care Plan discussed with: Patient, Family, Nursing Staff, Consultant/Specialist and >50% of time spent in counseling and coordination of care Discussed:  Code Status and Care Plan      
___________________________________________________ Attending Physician: Sultana Carvalho MD

## 2018-08-29 NOTE — PROGRESS NOTES
Problem: Self Care Deficits Care Plan (Adult) Goal: *Acute Goals and Plan of Care (Insert Text) Occupational Therapy Goals Initiated 8/29/2018 1. Patient will perform lower body dressing with modified independence within 7 day(s). 2.  Patient will perform grooming, standing at sink, with modified independence within 7 day(s). 3.  Patient will perform toilet transfers with modified independence within 7 day(s). 4.  Patient will perform all aspects of toileting with modified independence within 7 day(s). 5.  Patient will participate in upper extremity therapeutic exercise/activities with modified independence for 10 minutes within 7 day(s). Occupational Therapy EVALUATION Patient: Mayur Healy (41 y.o. female) Date: 8/29/2018 Primary Diagnosis: Confusion Precautions: fall ASSESSMENT : 
Based on the objective data described below, the patient presents with hospital admission secondary to confusion. Per charting, patient with hallucinations, fearfulness and barricading herself in bathroom of her lower level apartment that is connected to daughters home. Patient alert and oriented today and able to explain events prior to hospitalization. Patient able to use RW in room and transfer to commode with CGA. Patient able to manage clothing as well as hygiene with CGA. Patient requesting to brush teeth while in bathroom and requires CGA and verbal cues for safe placement of RW. Patient returned to chair and able to demonstrate LE dressing tasks. Patient reports unable to wear lace up shoes secondary to difficulty tying. Educated patient and daughter on elastic shoe laces and both patient and daughter verbalized understanding. Patient will benefit from [facility] OT to ensure safety with ADL tasks within home environment. Patient will benefit from skilled intervention to address the above impairments. Patients rehabilitation potential is considered to be Good Factors which may influence rehabilitation potential include:  
[]             None noted [x]             Mental ability/status []             Medical condition []             Home/family situation and support systems []             Safety awareness []             Pain tolerance/management 
[]             Other: PLAN : 
Recommendations and Planned Interventions: 
[x]               Self Care Training                  [x]        Therapeutic Activities [x]               Functional Mobility Training    []        Cognitive Retraining 
[x]               Therapeutic Exercises           [x]        Endurance Activities [x]               Balance Training                   [x]        Neuromuscular Re-Education []               Visual/Perceptual Training     []   Home Safety Training 
[x]               Patient Education                 [x]        Family Training/Education []               Other (comment): Frequency/Duration: Patient will be followed by occupational therapy 5 times a week to address goals. Discharge Recommendations: Home Health Further Equipment Recommendations for Discharge: TBD SUBJECTIVE:  
Patient stated Ashu Hernandez would love to brush my teeth while I'm in here.  OBJECTIVE DATA SUMMARY:  
HISTORY:  
Past Medical History:  
Diagnosis Date  Depression \"better since moving into my daughters mother-in-law suite\"  DJD (degenerative joint disease), lumbar  GERD (gastroesophageal reflux disease)   
 stricture.  Glaucoma   
 [place] (hard of hearing)  HTN (hypertension)  Keratoconjunctivitis   
 sicca  Macular degeneration   
 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 didn't help  Osteopenia  Venous insufficiency 10/10/2011 Past Surgical History:  
Procedure Laterality Date  BREAST SURGERY PROCEDURE UNLISTED  30 yrs ago Breast Lumpectomy (side?)  EGD  2004  HX CHOLECYSTECTOMY  07/2018  HX COLONOSCOPY  2004  HX ENDOSCOPY  2004  HX HYSTERECTOMY  HX ORTHOPAEDIC  2005  
 cyst/tumor left hip  HX ORTHOPAEDIC  2014 Right Total Hip    Dr. Sommer AVILA  
 HX PACEMAKER  11/4/2011 [facility] ZAMORA  
 HX PACEMAKER    
 STRESS TEST LEXISCAN/CARDIOLITE  10/3/11  
 normal perfusion, EF 72% Prior Level of Function/Environment/Context: modified independent Occupations in which the patient is/was successful, what are the barriers preventing that success:  
Performance Patterns (routines, roles, habits, and rituals):  
Personal Interests and/or values:  
Expanded or extensive additional review of patient history:  
 
Home Situation Home Environment: Private residence One/Two Story Residence: One story Living Alone: No 
Support Systems: Family member(s) Patient Expects to be Discharged to[de-identified] Private residence Current DME Used/Available at Home: Cane, straight, Walker, rolling Tub or Shower Type: Shower Hand dominance: Right EXAMINATION OF PERFORMANCE DEFICITS: 
Cognitive/Behavioral Status: 
Neurologic State: Alert Orientation Level: Oriented X4 Cognition: Appropriate decision making; Follows commands Perception: Appears intact Perseveration: No perseveration noted Skin: intact as seen Edema: none noted Hearing: Auditory Auditory Impairment: Hard of hearing, bilateral 
 
Vision/Perceptual:   
    
    
    
  
    
Acuity:  (diminished- glaucoma and macular degeneration) Corrective Lenses: Glasses Range of Motion: 
AROM: Within functional limits Strength: 
Strength: Generally decreased, functional 
  
  
  
  
 
Coordination: 
Coordination: Within functional limits Fine Motor Skills-Upper: Left Intact; Right Intact Gross Motor Skills-Upper: Left Intact; Right Intact Tone & Sensation: 
Tone: Normal 
Sensation: Intact Balance: 
Sitting: Intact Standing: With support Standing - Static: Good Standing - Dynamic : Fair Functional Mobility and Transfers for ADLs: 
Bed Mobility: 
Rolling: Independent Supine to Sit: Modified independent Scooting: Modified independent Transfers: 
Sit to Stand: Supervision Stand to Sit: Supervision Toilet Transfer : Contact guard assistance ADL Assessment: 
Feeding: Independent Oral Facial Hygiene/Grooming: Contact guard assistance (standing at sink) Upper Body Dressing: Supervision Lower Body Dressing: Contact guard assistance Toileting: Contact guard assistance ADL Intervention and task modifications: 
  
 
  
 
  
 
  
 
  
 
  
 
  
 
  
 
Therapeutic Exercise: 
  
Functional Measure: 
Barthel Index: 
 
Bathin Bladder: 5 Bowels: 10 
Groomin Dressin Feeding: 10 Mobility: 10 Stairs: 0 Toilet Use: 5 Transfer (Bed to Chair and Back): 10 Total: 60 Barthel and G-code impairment scale: 
Percentage of impairment CH 
0% CI 
1-19% CJ 
20-39% CK 
40-59% CL 
60-79% CM 
80-99% CN 
100% Barthel Score 0-100 100 99-80 79-60 59-40 20-39 1-19 
 0 Barthel Score 0-20 20 17-19 13-16 9-12 5-8 1-4 0 The Barthel ADL Index: Guidelines 1. The index should be used as a record of what a patient does, not as a record of what a patient could do. 2. The main aim is to establish degree of independence from any help, physical or verbal, however minor and for whatever reason. 3. The need for supervision renders the patient not independent. 4. A patient's performance should be established using the best available evidence. Asking the patient, friends/relatives and nurses are the usual sources, but direct observation and common sense are also important. However direct testing is not needed. 5. Usually the patient's performance over the preceding 24-48 hours is important, but occasionally longer periods will be relevant.  
6. Middle categories imply that the patient supplies over 50 per cent of the effort. 7. Use of aids to be independent is allowed. Ubaldo Duel., Barthel, D.W. (1284). Functional evaluation: the Barthel Index. [address] (14)2. GISELE Holt, Ирина Coombs., [facility] [place], [address] (1999). Measuring the change indisability after inpatient rehabilitation; comparison of the responsiveness of the Barthel Index and Functional [place] Measure. Journal of Neurology, Neurosurgery, and Psychiatry, 66(4), 189-875. LANDEN BooneA, KIMMY Dobson, & Claudia Hsu M.A. (2004.) Assessment of post-stroke quality of life in cost-effectiveness studies: The usefulness of the Barthel Index and the EuroQoL-5D. [facility], 32, 187-51 G codes: In compliance with CMSs Claims Based Outcome Reporting, the following G-code set was chosen for this patient based on their primary functional limitation being treated: The outcome measure chosen to determine the severity of the functional limitation was the Barthel Index  with a score of 60/100 which was correlated with the impairment scale. ? Self Care:  
  - CURRENT STATUS: CJ - 20%-39% impaired, limited or restricted  - GOAL STATUS: CI - 1%-19% impaired, limited or restricted  - D/C STATUS:  ---------------To be determined--------------- Occupational Therapy Evaluation Charge Determination History Examination Decision-Making LOW Complexity : Brief history review  LOW Complexity : 1-3 performance deficits relating to physical, cognitive , or psychosocial skils that result in activity limitations and / or participation restrictions  LOW Complexity : No comorbidities that affect functional and no verbal or physical assistance needed to complete eval tasks Based on the above components, the patient evaluation is determined to be of the following complexity level: LOW Pain: 
Pain Scale 1: Numeric (0 - 10) Pain Intensity 1: 0 Activity Tolerance: VSS 
 Please refer to the flowsheet for vital signs taken during this treatment. After treatment:  
[x] Patient left in no apparent distress sitting up in chair 
[] Patient left in no apparent distress in bed 
[x] Call bell left within reach [x] Nursing notified 
[x] Caregiver present 
[] Bed alarm activated COMMUNICATION/EDUCATION:  
The patients plan of care was discussed with: Physical Therapist and Registered Nurse. [x] Home safety education was provided and the patient/caregiver indicated understanding. [x] Patient/family have participated as able in goal setting and plan of care. [x] Patient/family agree to work toward stated goals and plan of care. [] Patient understands intent and goals of therapy, but is neutral about his/her participation. [] Patient is unable to participate in goal setting and plan of care. This patients plan of care is appropriate for delegation to [facility]. Thank you for this referral. 
Lobo Valadez, OTR/L Time Calculation: 31 mins

## 2018-08-29 NOTE — PROGRESS NOTES
Problem: Mobility Impaired (Adult and Pediatric) Goal: *Acute Goals and Plan of Care (Insert Text) Physical Therapy Goals Initiated 8/29/2018 1. Patient will transfer from bed to chair and chair to bed with modified independence using the least restrictive device within 3 day(s). 3.  Patient will perform sit to stand with modified independence within 3 day(s). 4.  Patient will ambulate with modified independence for 250 feet with the least restrictive device within 3 day(s). 5.  Patient will ascend/descend 12 stairs with 1 handrail(s) with minimal assistance/contact guard assist within 3 day(s). physical Therapy EVALUATION Patient: Alicia Kevin (78 y.o. female) Date: 8/29/2018 Primary Diagnosis: Confusion Precautions: fall ASSESSMENT : 
Based on the objective data described below, the patient presents with impaired balance and generalized weakness limiting functional mobility and activity tolerance. She is requiring close standby for transitions and gait with rolling walker. At baseline she is indep in her home environment, occasionally using a cane. She lives on 1st floor of her daughter's home; [address]. She must ascend a full flight stairs if she 'visits' upstairs, however has her own kitchen on 1st floor and makes her own meals. She recently was receiving OP PT for generalized strengthening and balance, having had 3 falls in 6 weeks. Would recommend home health at discharge. Patient will benefit from skilled intervention to address the above impairments. Patients rehabilitation potential is considered to be Excellent Factors which may influence rehabilitation potential include:  
[x]         None noted 
[]         Mental ability/status []         Medical condition 
[]         Home/family situation and support systems 
[]         Safety awareness 
[]         Pain tolerance/management 
[]         Other: PLAN : 
Recommendations and Planned Interventions: []           Bed Mobility Training             [x]    Neuromuscular Re-Education 
[x]           Transfer Training                   []    Orthotic/Prosthetic Training 
[x]           Gait Training                         []    Modalities [x]           Therapeutic Exercises           []    Edema Management/Control 
[x]           Therapeutic Activities            [x]    Patient and Family Training/Education 
[]           Other (comment): Frequency/Duration: Patient will be followed by physical therapy  5 times a week to address goals. Discharge Recommendations: Home Health Further Equipment Recommendations for Discharge: none SUBJECTIVE:  
Patient stated they (PT OP) said I did great.  OBJECTIVE DATA SUMMARY:  
HISTORY:   
Past Medical History:  
Diagnosis Date  Depression \"better since moving into my daughters mother-in-law suite\"  DJD (degenerative joint disease), lumbar  GERD (gastroesophageal reflux disease)   
 stricture.  Glaucoma   
 [place] (hard of hearing)  HTN (hypertension)  Keratoconjunctivitis   
 sicca  Macular degeneration   
 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 didn't help  Osteopenia  Venous insufficiency 10/10/2011 Past Surgical History:  
Procedure Laterality Date  BREAST SURGERY PROCEDURE UNLISTED  30 yrs ago Breast Lumpectomy (side?)  EGD  2004  HX CHOLECYSTECTOMY  07/2018  HX COLONOSCOPY  2004  HX ENDOSCOPY  2004  HX HYSTERECTOMY  HX ORTHOPAEDIC  2005  
 cyst/tumor left hip  HX ORTHOPAEDIC  2014 Right Total Hip    Dr. Ruth Ann AVILA  
 HX PACEMAKER  11/4/2011 [facility] ZAMORA  
 HX PACEMAKER    
 STRESS TEST LEXISCAN/CARDIOLITE  10/3/11  
 normal perfusion, EF 72% Prior Level of Function/Home Situation: see above Personal factors and/or comorbidities impacting plan of care: excellent home set up; psych hx; pacer; THR R Home Situation Home Environment: Private residence One/Two Story Residence: One story Living Alone: No 
Support Systems: Family member(s) Patient Expects to be Discharged to[de-identified] Private residence Current DME Used/Available at Home: Cane, straight EXAMINATION/PRESENTATION/DECISION MAKING:  
Critical Behavior: 
Neurologic State: Alert Hearing: Auditory Auditory Impairment: Hard of hearing, bilateral 
Skin:  nl 
Edema: no major problem Range Of Motion: 
AROM: Within functional limits Strength:   
Strength: Generally decreased, functional 
  
  
  
  
  
  
Tone & Sensation:  
Tone: Normal 
  
  
  
  
Sensation: Intact Coordination: 
Coordination: Within functional limits Vision:  
  
Functional Mobility: 
Bed Mobility: 
Rolling: Independent Supine to Sit: Modified independent Scooting: Modified independent Transfers: 
Sit to Stand: Stand-by assistance Stand to Sit: Stand-by assistance Other: commode - stand by 
Balance:  
Sitting: Intact Standing: Impaired; Without support Standing - Static: Fair Standing - Dynamic : Fair Ambulation/Gait Training: 
Distance (ft): 25 Feet (ft) (x2) Assistive Device: Walker, rolling Ambulation - Level of Assistance: Contact guard assistance Gait Abnormalities: Trunk sway increased;Decreased step clearance Base of Support: Widened Speed/Caterina: Slow Step Length: Right shortened;Left shortened Therapeutic Exercises:  
Performing her HEP from OP daily. Encouraged to continue Functional Measure: 
Tinetti test: 
 
Sitting Balance: 1 Arises: 1 Attempts to Rise: 2 Immediate Standing Balance: 0 Standing Balance: 1 Nudged: 0 Eyes Closed: 0 Turn 360 Degrees - Continuous/Discontinuous: 1 Turn 360 Degrees - Steady/Unsteady: 0 Sitting Down: 1 Balance Score: 7 Indication of Gait: 0 
R Step Length/Height: 1 L Step Length/Height: 1 
R Foot Clearance: 1 L Foot Clearance: 1 Step Symmetry: 1 Step Continuity: 1 Path: 1 Trunk: 0 Walking Time: 0 Gait Score: 7 Total Score: 14 Tinetti Test and G-code impairment scale: 
Percentage of Impairment CH 
 
0% 
 CI 
 
1-19% CJ 
 
20-39% CK 
 
40-59% CL 
 
60-79% CM 
 
80-99% CN  
 
100% Tinetti Score 0-28 28 23-27 17-22 12-16 6-11 1-5 0 Tinetti Tool Score Risk of Falls 
<19 = High Fall Risk 19-24 = Moderate Fall Risk 25-28 = Low Fall Risk Tinetti ME. Performance-Oriented Assessment of Mobility Problems in Elderly Patients. Beasley 66; [id number]. (Scoring Description: PT Bulletin Feb. 10, 1993) Older adults: Saint Lederer et al, 2009; n = 1601 S [employer] elderly evaluated with ABC, HERSON, ADL, and IADL) · Mean HERSON score for males aged 69-68 years = 26.21(3.40) · Mean HERSON score for females age 69-68 years = 25.16(4.30) · Mean HERSON score for males over 80 years = 23.29(6.02) · Mean HERSON score for females over 80 years = 17.20(8.32) G codes: In compliance with CMSs Claims Based Outcome Reporting, the following G-code set was chosen for this patient based on their primary functional limitation being treated: The outcome measure chosen to determine the severity of the functional limitation was the tinetti with a score of 14/28 which was correlated with the impairment scale. ? Mobility - Walking and Moving Around:  
  - CURRENT STATUS: CK - 40%-59% impaired, limited or restricted  - GOAL STATUS: CJ - 20%-39% impaired, limited or restricted  - D/C STATUS:  ---------------To be determined--------------- Physical Therapy Evaluation Charge Determination History Examination Presentation Decision-Making MEDIUM  Complexity : 1-2 comorbidities / personal factors will impact the outcome/ POC  MEDIUM Complexity : 3 Standardized tests and measures addressing body structure, function, activity limitation and / or participation in recreation  LOW Complexity : Stable, uncomplicated  Other outcome measures tinetti  LOW Based on the above components, the patient evaluation is determined to be of the following complexity level: LOW Pain: 
Pain Scale 1: Numeric (0 - 10) Pain Intensity 1: 0 Activity Tolerance:  
'just weak' Please refer to the flowsheet for vital signs taken during this treatment. After treatment:  
[x]         Patient left in no apparent distress sitting up in chair 
[]         Patient left in no apparent distress in bed 
[x]         Call bell left within reach [x]         Nursing notified 
[]         Caregiver present 
[]         Bed alarm activated COMMUNICATION/EDUCATION:  
The patients plan of care was discussed with: Registered Nurse. [x]         Fall prevention education was provided and the patient/caregiver indicated understanding. [x]         Patient/family have participated as able in goal setting and plan of care. []         Patient/family agree to work toward stated goals and plan of care. []         Patient understands intent and goals of therapy, but is neutral about his/her participation. []         Patient is unable to participate in goal setting and plan of care. Thank you for this referral. 
D[facility] Lisa, PT Time Calculation: 22 mins

## 2018-08-29 NOTE — ED TRIAGE NOTES
Triage: Severe Paranoia seeing things that are not there (people) per daughters. Barricading herself in the utility room, blocked the windows, tore some furniture down ( cabinet doors). Family reports that this type of behavior has happened before and they thought it was due to sleep deprivation was given medication to help sleep but refuses to take it. Also family reports that this going on for a month or so, she will take the medication sometimes, but not everyday. history of UTI.

## 2018-08-29 NOTE — CONSULTS
NEUROLOGY IN-PATIENT NEW CONSULTATION      8/29/2018    RE: Zoila Jaquez         7/20/1931      REFERRED BY:  Rikki Chavez MD      CHIEF COMPLAINT:  This is Zoila Jaquez is a 80 y.o. female right handed who had concerns including Altered mental status. HPI:     For the past several days, patient has noted seeing people moving in her house, only happens at night, seem to disappear when she changes her direction of gaze. Patient was so afraid that she was unable to sleep for several days. Denies memory problem. Lives by herself and still independent in ADLs    (+) severe bilateral macular degeneration and glaucoma which have gotten worse for the past several months and has not seen her ophthalmologist.    ROS   (-) fever  (-) rash  (-) auditory hallucinations  All other systems reviewed and are negative    Past Medical Hx  Past Medical History:   Diagnosis Date    Depression     \"better since moving into my daughters mother-in-law suite\"    DJD (degenerative joint disease), lumbar     GERD (gastroesophageal reflux disease)     stricture.  Glaucoma     [place] (hard of hearing)     HTN (hypertension)     Keratoconjunctivitis     sicca    Macular degeneration     macular degeneration vs ooptic neuropathy    ABAD (obstructive sleep apnea)     decided not to use d/t frequent bathroom trips at night.  didn't help    Osteopenia     Venous insufficiency 10/10/2011       Social Hx  Social History     Social History    Marital status:      Spouse name: N/A    Number of children: N/A    Years of education: N/A     Social History Main Topics    Smoking status: Never Smoker    Smokeless tobacco: Never Used    Alcohol use No    Drug use: No    Sexual activity: Not Currently     Partners: Male     Other Topics Concern    None     Social History Narrative       Family Hx  Family History   Problem Relation Age of Onset    Heart Failure Mother     Psychiatric Disorder Mother     Heart Disease Father     Stroke Brother     Psychiatric Disorder Other      aunt       ALLERGIES  Allergies   Allergen Reactions    Meperidine Hives     Other reaction(s): Hives    Ace Inhibitors Unknown (comments)    Aldactone [Spironolactone] Other (comments)     hyperkalemia    Codeine Unknown (comments)    Miralax [Polyethylene Glycol 3350] Nausea and Vomiting    Other Plant, Animal, Environmental Rash     Bilateral Hearing Aids cause severe reaction per pt report    Percocet [Oxycodone-Acetaminophen] Unknown (comments)    Statins-Hmg-Coa Reductase Inhibitors Unknown (comments)    Sular [Nisoldipine] Rash    Zetia [Ezetimibe] Unknown (comments)       CURRENT MEDS  Current Facility-Administered Medications   Medication Dose Route Frequency Provider Last Rate Last Dose    latanoprost (XALATAN) 0.005 % ophthalmic solution 1 Drop  1 Drop Both Eyes QHS Chance Holloway MD   1 Drop at 08/29/18 0142    mirtazapine (REMERON) tablet 15 mg  15 mg Oral QHS Chance Holloway MD   15 mg at 08/29/18 0142    0.9% sodium chloride infusion  75 mL/hr IntraVENous CONTINUOUS Chance Holloway MD 75 mL/hr at 08/29/18 0018 75 mL/hr at 08/29/18 0018    acetaminophen (TYLENOL) tablet 650 mg  650 mg Oral Q4H PRN Chance Holloway MD   650 mg at 08/29/18 0142    diphenhydrAMINE (BENADRYL) capsule 25 mg  25 mg Oral Q4H PRN Chance Holloway MD        [facility]dansetron [facility] injection 4 mg  4 mg IntraVENous Q4H PRN Chance Holloway MD        enoxaparin (LOVENOX) injection 40 mg  40 mg SubCUTAneous Q24H Chance Holloway MD   40 mg at 08/29/18 7200           PREVIOUS WORKUP: (reviewed)  IMAGING:    CT Results (recent):    Results from Hospital Encounter encounter on 08/28/18   CT HEAD WO CONT   Narrative EXAM:  CT HEAD WO CONT    INDICATION:   paranoia    COMPARISON: 8/14/2011 MRI. CONTRAST:  None. TECHNIQUE: Unenhanced CT of the head was performed using 5 mm images. Brain and  bone windows were generated.   CT dose reduction was achieved through use of a  standardized protocol tailored for this examination and automatic exposure  control for dose modulation. FINDINGS:  The ventricles and sulci are normal in size, shape and configuration and  midline. There are vague hypodensities in the left basal ganglia, with a  corresponding change on the 2011 MRI. There is no intracranial hemorrhage,  extra-axial collection, mass, mass effect or midline shift. The basilar  cisterns are open. No acute infarct is identified. The bone windows demonstrate  no abnormalities. The visualized portions of the paranasal sinuses and mastoid  air cells are clear. 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 No acute process identified by  noncontrast CT        MRI Results (recent):    Results from Hospital Encounter encounter on 08/14/11   MRI BRAIN WITHOUT CONTRAST   Narrative **Final Report**      ICD Codes / Adm. Diagnosis: 780.4  728.87 / DIZZINESS  LEFT-SIDED MUSCLE   WEAKNESS  Examination:  MR BRAIN  CON  - [id number] - Aug 14 2011  7:18PM  Accession No:  [id number]  Reason:  lt sided weakness      REPORT:  INDICATION:    Exam: Multiplanar noncontrast MRI of the brain is performed with T1, T2,   gradient, FLAIR and diffusion sequences. A total of 20 mL of gadolinium   contrast was administered intravenously. Direct comparison is made to prior CT dated 8/14/2011. FINDINGS: There is no restricted diffusion to suggest acute infarct. The   ventricular system is normal. There are confluent and punctate white matter   hyperintensities consistent with chronic microvascular ischemic changes. The   cervicomedullary junction is normal and there is no tonsillar ectopia. There   is no acute intracranial hemorrhage, prior intracranial hemorrhage or   extra-axial fluid collection. The visualized vascular flow-voids of the   skull base are normal. There is no intracranial mass lesion, mass effect or   herniation.        IMPRESSION: No acute intracranial infarct or hemorrhage. Signing/Reading Doctor: QUE Hussein ([id number])  Transcribed:  on 08/14/2011  Approved: QUE Hussein ([id number])  08/14/2011          Distribution:  Attending Doctor: Tomie Phalen Doctor: Fadia Carrizales            IR Results (recent):  No results found for this or any previous visit. VAS/US Results (recent):  No results found for this or any previous visit. LABS (reviewed)  Results for orders placed or performed during the hospital encounter of 08/28/18   CULTURE, BLOOD   Result Value Ref Range    Special Requests: NO SPECIAL REQUESTS      Culture result: NO GROWTH AFTER 6 HOURS     URINALYSIS W/MICROSCOPIC   Result Value Ref Range    Color DARK YELLOW      Appearance CLOUDY (A) CLEAR      Specific gravity 1.029 1.003 - 1.030      pH (UA) 5.5 5.0 - 8.0      Protein TRACE (A) NEG mg/dL    Glucose NEGATIVE  NEG mg/dL    Ketone 40 (A) NEG mg/dL    Blood NEGATIVE  NEG      Urobilinogen 1.0 0.2 - 1.0 EU/dL    Nitrites NEGATIVE  NEG      Leukocyte Esterase MODERATE (A) NEG      WBC 10-20 0 - 4 /hpf    RBC 5-10 0 - 5 /hpf    Epithelial cells MANY (A) FEW /lpf    Bacteria NEGATIVE  NEG /hpf    Mucus TRACE (A) NEG /lpf    Hyaline cast >20 (H) 0 - 5 /lpf    Other: Renal Epithelial cells Present     CBC WITH AUTOMATED DIFF   Result Value Ref Range    WBC 9.6 3.6 - 11.0 K/uL    RBC 5.24 (H) 3.80 - 5.20 M/uL    HGB 15.1 11.5 - 16.0 g/dL    HCT 46.1 35.0 - 47.0 %    MCV 88.0 80.0 - 99.0 FL    MCH 28.8 26.0 - 34.0 PG    MCHC 32.8 30.0 - 36.5 g/dL    RDW 13.1 11.5 - 14.5 %    PLATELET 612 219 - 688 K/uL    MPV 12.2 8.9 - 12.9 FL    NRBC 0.0 0  WBC    ABSOLUTE NRBC 0.00 0.00 - 0.01 K/uL    NEUTROPHILS 69 32 - 75 %    LYMPHOCYTES 17 12 - 49 %    MONOCYTES 12 5 - 13 %    EOSINOPHILS 1 0 - 7 %    BASOPHILS 1 0 - 1 %    IMMATURE GRANULOCYTES 0 0.0 - 0.5 %    ABS. NEUTROPHILS 6.6 1.8 - 8.0 K/UL    ABS. LYMPHOCYTES 1.6 0.8 - 3.5 K/UL    ABS.  MONOCYTES 1.2 (H) 0.0 - 1.0 K/UL ABS. EOSINOPHILS 0.1 0.0 - 0.4 K/UL    ABS. BASOPHILS 0.1 0.0 - 0.1 K/UL    ABS. IMM. GRANS. 0.0 0.00 - 0.04 K/UL    DF AUTOMATED     METABOLIC PANEL, COMPREHENSIVE   Result Value Ref Range    Sodium 140 136 - 145 mmol/L    Potassium 4.2 3.5 - 5.1 mmol/L    Chloride 108 97 - 108 mmol/L    CO2 24 21 - 32 mmol/L    Anion gap 8 5 - 15 mmol/L    Glucose 102 (H) 65 - 100 mg/dL    BUN 23 (H) 6 - 20 MG/DL    Creatinine 1.05 (H) 0.55 - 1.02 MG/DL    BUN/Creatinine ratio 22 (H) 12 - 20      GFR est AA >60 >60 ml/min/1.73m2    GFR est non-AA 50 (L) >60 ml/min/1.73m2    Calcium 8.8 8.5 - 10.1 MG/DL    Bilirubin, total 0.9 0.2 - 1.0 MG/DL    ALT (SGPT) 12 12 - 78 U/L    AST (SGOT) 14 (L) 15 - 37 U/L    Alk. phosphatase 90 45 - 117 U/L    Protein, total 6.9 6.4 - 8.2 g/dL    Albumin 3.7 3.5 - 5.0 g/dL    Globulin 3.2 2.0 - 4.0 g/dL    A-G Ratio 1.2 1.1 - 2.2     TROPONIN I   Result Value Ref Range    Troponin-I, Qt. <0.05 <0.05 ng/mL   SAMPLES BEING HELD   Result Value Ref Range    SAMPLES BEING HELD gold,bldcs,  blue qns     COMMENT        Add-on orders for these samples will be processed based on acceptable specimen integrity and analyte stability, which may vary by analyte. BILIRUBIN, CONFIRM   Result Value Ref Range    Bilirubin UA, confirm NEGATIVE  NEG     SAMPLES BEING HELD   Result Value Ref Range    SAMPLES BEING HELD 1UC     COMMENT        Add-on orders for these samples will be processed based on acceptable specimen integrity and analyte stability, which may vary by analyte.    URINALYSIS W/MICROSCOPIC   Result Value Ref Range    Color YELLOW/STRAW      Appearance CLEAR CLEAR      Specific gravity 1.007 1.003 - 1.030      pH (UA) 6.0 5.0 - 8.0      Protein NEGATIVE  NEG mg/dL    Glucose NEGATIVE  NEG mg/dL    Ketone TRACE (A) NEG mg/dL    Bilirubin NEGATIVE  NEG      Blood NEGATIVE  NEG      Urobilinogen 1.0 0.2 - 1.0 EU/dL    Nitrites NEGATIVE  NEG      Leukocyte Esterase NEGATIVE  NEG      WBC 0-4 0 - 4 /hpf    RBC 0-5 0 - 5 /hpf    Epithelial cells FEW FEW /lpf    Bacteria NEGATIVE  NEG /hpf    Hyaline cast 0-2 0 - 5 /lpf   EKG, 12 LEAD, INITIAL   Result Value Ref Range    Ventricular Rate 74 BPM    Atrial Rate 64 BPM    P-R Interval 236 ms    QRS Duration 70 ms    Q-T Interval 404 ms    QTC Calculation (Bezet) 448 ms    Calculated P Axis 118 degrees    Calculated R Axis -45 degrees    Calculated T Axis -2 degrees    Diagnosis       Atrial-paced rhythm with prolonged AV conduction with frequent premature   ventricular complexes  Left axis deviation  Inferior infarct , age undetermined  Anterolateral infarct (cited on or before 14-JUL-2017)  Abnormal ECG  When compared with ECG of 14-JUL-2017 14:14,  No significant change was found         Physical Exam:     Visit Vitals    /58 (BP 1 Location: Left arm, BP Patient Position: At rest)    Pulse (!) 47    Temp 98.1 °F (36.7 °C)    Resp 16    Ht 5' 1\" (1.549 m)    Wt 78.5 kg (173 lb)    SpO2 95%    BMI 32.69 kg/m2     General:  Alert, cooperative, no distress. Head:  Normocephalic, without obvious abnormality, atraumatic. Eyes:  Conjunctivae/corneas clear. Lungs:  Heart:   Non labored breathing  Regular rate and rhythm, no carotid bruits   Abdomen:   Soft, non-distended   Extremities: Extremities normal, atraumatic, no cyanosis or edema. Pulses: 2+ and symmetric all extremities. Skin: Skin color, texture, turgor normal. No rashes or lesions.   Neurologic Exam     Gen: Attention normal             Language: naming, repetition, fluency normal             Memory: intact recent and remote memory 3/3  Able to spell world backwards    Cranial Nerves:  I: smell Not tested   II: visual fields Full to confrontation   II: pupils Equal, round, reactive to light   II: optic disc No papilledema   III,VII: ptosis none   III,IV,VI: extraocular muscles  Full ROM   V: mastication normal   V: facial light touch sensation  normal   VII: facial muscle function   symmetric VIII: hearing symmetric   IX: soft palate elevation  normal   XI: trapezius strength  5/5   XI: sternocleidomastoid strength 5/5   XI: neck flexion strength  5/5   XII: tongue  midline     Motor: normal bulk and tone, no tremor              Strength: 5/5 all four extremities  Sensory: intact to LT, PP, vibration, and temperature  Reflexes: 1+ throughout; Down going toes  Coordination: Good FTN and HTS  Gait: deferred           Impression:     Donnie Sanchez is a 80 y.o. female who  has a past medical history of Depression; DJD (degenerative joint disease), lumbar; GERD (gastroesophageal reflux disease); Glaucoma; [place] (hard of hearing); HTN (hypertension); Keratoconjunctivitis; Macular degeneration; ABAD (obstructive sleep apnea); Osteopenia; and Venous insufficiency (10/10/2011). pacemaker who for the past several days, has noted seeing people moving in her house, only happens at night, seem to disappear when she changes her direction of gaze. Patient was so afraid that she was unable to sleep for several days. Consideration includes [place] syndrome due to severe bilateral macular degeneration and glaucoma which have gotten worse for the past several months. Neurological exam did not reveal overt dementia. CT head only showed old left basal ganglia infarct and no acute process identified by. RECOMMENDATIONS  1. I had a long discussion with patient. Discussed diagnosis, prognosis, pathophysiology and available treatment. Reviewed test results. All questions were answered. 2. Advise keeping house bright and use of night lights  3.  Discussed things that she can do to make visual illusions disappear (i.e. Wearing glasses, opening lights, closing and opening eyes, changing her direction of gaze)    Follow up with her ophthalmologist    Please call for questions        Thank you for the consultation      Concepcion Law MD  Diplomate, American Board of Psychiatry and Neurology  Diplomate, Neuromuscular Medicine  Diplomate, American Board of Electrodiagnostic Medicine    Greater than 50% of time spent counseling patient        CC: Sandhya Hernandez MD  Fax: [phone]

## 2018-08-29 NOTE — PROGRESS NOTES
Reason for Admission:   confusion RRAT Score:       13 Plan for utilizing home health:      Yes. Referral made to [facility] Likelihood of Readmission:  Low/green Transition of Care Plan: Met with the patient and daughter, Ji Harrison, [phone]. Address confirmed. Patient lives in a basement apartment with no steps of her daughter's home. Her one dtr/ live on the first floor and granddtr on the floor above that. Patient is able to give history but according to daughter leaves out important information. The patient was able to provide clear information on her hallucinations that took place, locking herself in the bathroom and thought she saw people breaking into her home that precipitated this hospitalization. She did not call her to her dtr upstairs. The daughter reported that this has been going on and off for years and does not believe it has anything to do with her eyesight as she gets her eyesight checked regularly. It seems to only happen at night. The patient's sister tried to commit suicide and had issues with ETOH abuse. The patient gets fearful at darkness. The patient has been functioning independent using no medical equipment but does have a walker, several canes, shower chair. Her daughter takes her out about twice a week and to her appointments. Dtr reports that at one time she was on Seroquel. Patient states she is not on that mediation anymore. Daughter states that she does not take one of her medication regularly because it makes her groggy in the morning. But whatever this medication is did help decrease the hallucinations. Patient has an appt with Dr. Valentín Argueta tomorrow and at the [facility] on Friday. PT Note recommends HH upon discharge. Patient/dtr agreeable. Referral made to [facility] for Nursing PT, OT.  Informed NN, Janina Dalton of patient's admission. Dtrs will be here at the hospital with the patient and take her home. Obs letters explained, signed and in the chart. Care Management Interventions PCP Verified by CM: Yes (Dr. Debo Greene) Transition of Care Consult (CM Consult): Discharge Planning (obs letter given) Physical Therapy Consult: Yes Occupational Therapy Consult: Yes Current Support Network: Relative's Home Confirm Follow Up Transport: Family Plan discussed with Pt/Family/Caregiver: Yes Discharge Location Discharge Placement: Unable to determine at this time [facility]

## 2018-08-29 NOTE — ED NOTES
Patient provided very small urine specimen. Only able to send UA at this time. Provider aware. Pt with foul smelling urine and states \"it's hard for me to start peeing when I have to. \"

## 2018-08-29 NOTE — ED NOTES
Bedside shift change report given to Mundo (oncoming nurse) by [facility] (offgoing nurse). Report included the following information SBAR, ED Summary, MAR and Recent Results.

## 2018-08-29 NOTE — PROGRESS NOTES
3286 
Primary Nurse Herbie Che RN and [place] pass, RN performed a dual skin assessment on this patient excoriation to abd folds Christian score is 21 
 
 
 
0029 TRANSFER - IN REPORT: 
 
Verbal report received from [place] pass, RN (name) on Adair Garcia  being received from Med Surg (unit) for routine progression of care Report consisted of patients Situation, Background, Assessment and  
Recommendations(SBAR). Information from the following report(s) SBAR, Kardex, Intake/Output, MAR, Recent Results and Med Rec Status was reviewed with the receiving nurse. Opportunity for questions and clarification was provided. Assessment completed upon patients arrival to unit and care assumed.

## 2018-08-29 NOTE — ED NOTES
Attempted to call report, unable to receive at this time due to nurse being in pt room. Will call back shortly.

## 2018-08-29 NOTE — PROGRESS NOTES
Bedside and Verbal shift change report given to [employer] RN (oncoming nurse) by Kimberly Quiros RN (offgoing nurse). Report included the following information SBAR, Kardex, Intake/Output, MAR, Recent Results and Med Rec Status.

## 2018-08-29 NOTE — PROGRESS NOTES
Palomo Suresh [address] 
[address] [facility], [address], [address] [place] [address] 
[phone] Medical Progress Note NAME: Donnie Sanchez :  1931 MRM:  [id number] Date/Time: 2018  8:59 AM 
 
  
Assessment and Plan: 1. Hallucination/ Depression / Psychophysiological insomnia - unclear etiology. Consult neurology and psychiatry. Continue usual remeron. UA is normal.  
  
2. ABAD (obstructive sleep apnea) - Resume home CPA  
  
3.  Essential hypertension - Hold bumex and spironolactone and hydrate. Continue metoprolol.   
  
4. GERD (gastroesophageal reflux disease) / Esophageal stricture - PPI if symptoms 
  
5. DJD (degenerative joint disease), lumbar - Tylenol prn. Avoid narcotics  
  
6. Macular degeneration / Glaucoma - Xalatan 
  
 addendum: 13:30 Had an extensive discussion with one of the daughters and informed me that, pt has been having visual hallucination for about 6 years on and off. She sees people trying to heart her. She stays in her house locked, putting furniture to block the door and very afraid of being hurt. Very paranoid. She was started on antipsychotic meds by previous PCP and was doing better, but stopped taking them by herself. Subjective: Chief Complaint:  Follow up of pt who was admitted with hallucination. Alert and oriented. Visual hallucination is intermittent ROS: 
(bold if positive, if negative) Tolerating PT  Tolerating Diet Objective:  
 
Last 24hrs VS reviewed since prior progress note. Most recent are: 
 
Visit Vitals  /50 (BP 1 Location: Right arm, BP Patient Position: At rest)  Pulse 62  Temp 97.8 °F (36.6 °C)  Resp 18  Ht 5' 1\" (1.549 m)  Wt 78.5 kg (173 lb)  SpO2 93%  BMI 32.69 kg/m2 SpO2 Readings from Last 6 Encounters:  
18 93% 18 95% 18 96% 18 95% 18 96% 18 97% Intake/Output Summary (Last 24 hours) at 18 0549 Last data filed at 08/29/18 1212 Gross per 24 hour Intake                0 ml Output              400 ml Net             -400 ml Physical Exam: 
 
Gen:  Well-developed, well-nourished, in no acute distress HEENT:  Pink conjunctivae, PERRL, hearing intact to voice, moist mucous membranes Neck:  Supple, without masses, thyroid non-tender Resp:  No accessory muscle use, clear breath sounds without wheezes rales or rhonchi 
Card:  No murmurs, normal S1, S2 without thrills, bruits or peripheral edema Abd:  Soft, non-tender, non-distended, normoactive bowel sounds are present, no palpable organomegaly and no detectable hernias Lymph:  No cervical or inguinal adenopathy Musc:  No cyanosis or clubbing Skin:  No rashes or ulcers, skin turgor is good Neuro:  Cranial nerves are grossly intact, no focal motor weakness, follows commands appropriately Psych:  Good insight, oriented to person, place and time, alert 
__________________________________________________________________ Medications Reviewed: (see below) Medications:  
 
Current Facility-Administered Medications Medication Dose Route Frequency  latanoprost (XALATAN) 0.005 % ophthalmic solution 1 Drop  1 Drop Both Eyes QHS  metoprolol succinate (TOPROL-XL) XL tablet 100 mg  100 mg Oral DAILY  mirtazapine (REMERON) tablet 15 mg  15 mg Oral QHS  
 0.9% sodium chloride infusion  75 mL/hr IntraVENous CONTINUOUS  
 acetaminophen (TYLENOL) tablet 650 mg  650 mg Oral Q4H PRN  
 diphenhydrAMINE (BENADRYL) capsule 25 mg  25 mg Oral Q4H PRN  
 ondansetron (ZOFRAN) injection 4 mg  4 mg IntraVENous Q4H PRN  
 enoxaparin (LOVENOX) injection 40 mg  40 mg SubCUTAneous Q24H Lab Data Reviewed: (see below) Lab Review:  
 
Recent Labs  
   08/28/18 2201 WBC  9.6 HGB  15.1 HCT  46.1 PLT  190 Recent Labs  
   08/28/18 
 2201 NA  140  
K  4.2 CL  108 CO2  24 GLU  102* BUN  23* CREA  1.05* CA  8.8 ALB  3.7 TBILI  0.9 SGOT  14* ALT  12 Lab Results Component Value Date/Time Glucose (POC) 166 (H) 05/26/2010 10:16 AM  
 
No results for input(s): PH, PCO2, PO2, HCO3, FIO2 in the last 72 hours. No results for input(s): INR in the last 72 hours. No lab exists for component: INREXT All Micro Results Procedure Component Value Units Date/Time CULTURE, BLOOD [[id number]] Collected:  08/28/18 2202 Order Status:  Completed Specimen:  Blood from Blood Updated:  08/29/18 0139 Special Requests: NO SPECIAL REQUESTS Culture result: NO GROWTH AFTER 6 HOURS     
 CULTURE, URINE [[id number]] Collected:  08/29/18 0053 Order Status:  Completed Specimen:  Urine from Cath Urine Updated:  08/29/18 0102 CULTURE, URINE [[id number]] Collected:  08/28/18 2310 Order Status:  Canceled Specimen:  Urine from Cath Urine Updated:  08/29/18 4310 CULTURE, URINE [[id number]] Order Status:  Canceled Specimen:  Urine from Clean catch CULTURE, BLOOD [[id number]] Order Status:  Canceled Specimen:  Blood CULTURE, URINE [[id number]] Order Status:  Canceled Specimen:  Urine from Clean catch URINE CULTURE HOLD SAMPLE [[id number]] Order Status:  Canceled Specimen:  Urine URINE CULTURE HOLD SAMPLE [[id number]] Collected:  08/28/18 2157 Order Status:  Canceled Specimen:  Urine I have reviewed notes of prior 24hr. Other pertinent lab: Total time spent with patient: 28 Care Plan discussed with: Patient, Nursing Staff and >50% of time spent in counseling and coordination of care Discussed:  Care Plan Prophylaxis:  Lovenox Disposition:  Home w/Family 
        
___________________________________________________ Attending Physician: Greg Rock MD

## 2018-08-29 NOTE — ED PROVIDER NOTES
HPI Comments: Bhavya Farah is a 80 y.o. female  who presents by private vehicle to ER with c/o Patient presents with: 
Altered mental status Patient with family. Per family patient with paranoia, hallucinations, and sleep deprivation. Patient lives in basement apartment below family and was found last night barricaded in a utility room. Patient reports being unable to sleep. Denies any chest or abdominal pain. Denies fever or chills. Patient with history of UTIs. She specifically denies any fevers, chills, nausea, vomiting, chest pain, shortness of breath, headache, rash, diarrhea, abdominal pain, urinary/bowel changes, sweating or weight loss. PCP: Chinedu Todd MD  
PMHx significant for: Past Medical History: 
No date: Anemia No date: Bradycardia No date: Depression Comment: \"better since moving into my daughters  
             mother-in-law suite\" No date: DJD (degenerative joint disease), lumbar No date: GERD (gastroesophageal reflux disease) Comment: stricture. No date: Glaucoma No date: Glucose intolerance No date: [place] (hard of hearing) No date: HTN (hypertension) No date: Keratoconjunctivitis Comment: sicca No date: Macular degeneration Comment: macular degeneration vs ooptic neuropathy No date: ABAD (obstructive sleep apnea) Comment: decided not to use d/t frequent bathroom trips 
             at night. didn't help No date: Osteopenia No date: Otitis media Comment: chronic 2011: Stroke [facility]) Comment: Mini stroke \"in the past, affected my eyes\" Need glasses for reading 10/10/2011: Venous insufficiency PSHx significant for: Past Surgical History: 
30 yrs ago: [address] [facility] Comment: Breast Lumpectomy (side?) 
2004: EGD 
07/2018: HX CHOLECYSTECTOMY 
2004: HX COLONOSCOPY 
2004: HX ENDOSCOPY No date: HX HYSTERECTOMY 
2005: HX ORTHOPAEDIC Comment: cyst/tumor left hip 2014: HX ORTHOPAEDIC 
 Comment: Right Total Hip    Dr. Patel Marker J-W 
11/4/2011: HX PACEMAKER Comment: [facility] No date: HX PACEMAKER 
10/3/11: STRESS TEST LEXISCAN/CARDIOLITE Comment: normal perfusion, EF 72% Social Hx: Tobacco use: Smoking status: Never Smoker Smokeless status: Never Used                     
; EtOH use: The patient states she drinks 0 per week.; Illicit Drug use: Allergies: 
 -- Meperidine -- Hives 
  --  Other reaction(s): Hives 
 -- Ace Inhibitors -- Unknown (comments) -- Aldactone (Spironolactone) -- Other (comments) --  hyperkalemia 
 -- Codeine -- Unknown (comments) -- Miralax (Polyethylene Glycol 3350) -- Nausea and Vomiting 
 -- Other Plant, Animal, Environmental -- Rash 
  --  Bilateral Hearing Aids cause severe reaction per 
           pt report -- Percocet (Oxycodone-Acetaminophen) -- Unknown (comments) -- Statins-Hmg-Coa Reductase Inhibitors -- Unknown (comments) -- Sular (Nisoldipine) -- Rash 
 -- Zetia (Ezetimibe) -- Unknown (comments) There are no other complaints, changes or physical findings at this time. Patient is a 80 y.o. female presenting with altered mental status. The history is provided by the patient and a relative. Altered mental status This is a new problem. The problem has not changed since onset. Associated symptoms include confusion, agitation, delusions and hallucinations. Mental status baseline is mild dementia. Her past medical history is significant for hypertension. Past Medical History:  
Diagnosis Date  Anemia  Bradycardia  Depression \"better since moving into my daughters mother-in-law suite\"  DJD (degenerative joint disease), lumbar  GERD (gastroesophageal reflux disease)   
 stricture.  Glaucoma  Glucose intolerance  [place] (hard of hearing)  HTN (hypertension)  Keratoconjunctivitis   
 sicca  Macular degeneration macular degeneration vs ooptic neuropathy  ABAD (obstructive sleep apnea) decided not to use d/t frequent bathroom trips at night. didn't help  Osteopenia  Otitis media   
 chronic  Stroke [facility]) 2011 Mini stroke \"in the past, affected my eyes\" Need glasses for reading  Venous insufficiency 10/10/2011 Past Surgical History:  
Procedure Laterality Date  BREAST SURGERY PROCEDURE UNLISTED  30 yrs ago Breast Lumpectomy (side?)  EGD  2004  HX CHOLECYSTECTOMY  07/2018  HX COLONOSCOPY  2004  HX ENDOSCOPY  2004  HX HYSTERECTOMY  HX ORTHOPAEDIC  2005  
 cyst/tumor left hip  HX ORTHOPAEDIC  2014 Right Total Hip    Dr. Emelyn Carter JYaritzaW  
 HX PACEMAKER  11/4/2011 [facility]  
 HX PACEMAKER    
 STRESS TEST LEXISCAN/CARDIOLITE  10/3/11  
 normal perfusion, EF 72% Family History:  
Problem Relation Age of Onset  Heart Failure Mother  Heart Disease Father  Stroke Brother Social History Social History  Marital status:  Spouse name: N/A  
 Number of children: N/A  
 Years of education: N/A Occupational History  Not on file. Social History Main Topics  Smoking status: Never Smoker  Smokeless tobacco: Never Used  Alcohol use No  
 Drug use: No  
 Sexual activity: Not Currently Partners: Male Other Topics Concern  Not on file Social History Narrative ALLERGIES: Meperidine; Ace inhibitors; Aldactone [spironolactone]; Codeine; Miralax [polyethylene glycol 3350]; Other plant, animal, environmental; Percocet [oxycodone-acetaminophen]; Statins-hmg-coa reductase inhibitors; Sular [nisoldipine]; and Zetia [ezetimibe] Review of Systems Constitutional: Positive for activity change. HENT: Negative. Eyes: Negative. Respiratory: Negative. Cardiovascular: Negative. Gastrointestinal: Negative. Endocrine: Negative. Genitourinary: Negative. Musculoskeletal: Negative. Skin: Negative. Allergic/Immunologic: Negative. Neurological: Negative. Hematological: Negative. Psychiatric/Behavioral: Positive for agitation, confusion and hallucinations. All other systems reviewed and are negative. Vitals:  
 08/28/18 2054 08/28/18 2115 08/28/18 2215 08/28/18 2230 BP: 189/83 152/62 137/65 156/63 Pulse: 67  73 73 Resp: 20 Temp: 98.3 °F (36.8 °C) SpO2: 94% 93% 92% 94% Weight: 78.5 kg (173 lb) Height: 5' 1\" (1.549 m) Physical Exam  
Constitutional: She is oriented to person, place, and time. She appears well-developed. Non-toxic appearance. She does not have a sickly appearance. She does not appear ill. No distress. HENT:  
Head: Normocephalic and atraumatic. Right Ear: External ear normal.  
Left Ear: External ear normal.  
Nose: Nose normal.  
Mouth/Throat: Oropharynx is clear and moist. No oropharyngeal exudate. Eyes: Conjunctivae, EOM and lids are normal. Right eye exhibits no discharge. Left eye exhibits no discharge. Neck: Normal range of motion. No tracheal deviation present. No thyromegaly present. Cardiovascular: Normal rate, regular rhythm, normal heart sounds and intact distal pulses. Pulmonary/Chest: Effort normal and breath sounds normal.  
Abdominal: Soft. Normal appearance and bowel sounds are normal. There is tenderness in the suprapubic area. Musculoskeletal: Normal range of motion. Neurological: She is alert and oriented to person, place, and time. No cranial nerve deficit or sensory deficit. Skin: Skin is warm and dry. Psychiatric: Judgment normal. Her mood appears anxious. She is actively hallucinating. Thought content is paranoid. Thought content is not delusional. She expresses no homicidal and no suicidal ideation. She expresses no suicidal plans and no homicidal plans. MDM Number of Diagnoses or Management Options Confusion:  
Hallucination: Urinary tract infection without hematuria, site unspecified:  
Diagnosis management comments: 11:49 PM 
Patient is being admitted to the hospital.  The results of their tests and reasons for their admission have been discussed with them and/or available family. They convey agreement and understanding for the need to be admitted and for their admission diagnosis. Consultation has been made with the inpatient physician specialist for hospitalization. LABORATORY TESTS: 
Recent Results (from the past 12 hour(s)) -URINALYSIS W/MICROSCOPIC Collection Time: 08/28/18  9:57 PM 
     Result                                            Value                         Ref Range Color                                             DARK YELLOW Appearance                                        CLOUDY (A)                    CLEAR Specific gravity                                  1.029                         1.003 - 1.030             
     pH (UA)                                           5.5                           5.0 - 8.0 Protein                                           TRACE (A)                     NEG mg/dL Glucose                                           NEGATIVE                      NEG mg/dL Ketone                                            40 (A)                        NEG mg/dL Blood                                             NEGATIVE                      NEG Urobilinogen                                      1.0                           0.2 - 1.0 EU/dL Nitrites                                          NEGATIVE                      NEG      Leukocyte Esterase                                MODERATE (A)                  NEG                       
 WBC                                               10-20                         0 - 4 /hpf                
     RBC                                               5-10                          0 - 5 /hpf Epithelial cells                                  MANY (A)                      FEW /lpf Bacteria                                          NEGATIVE                      NEG /hpf Mucus                                             TRACE (A)                     NEG /lpf Hyaline cast                                      >20 (H)                       0 - 5 /lpf Other:                                                                                                    
 Renal Epithelial cells Present -BILIRUBIN, CONFIRM Collection Time: 08/28/18  9:57 PM 
     Result                                            Value                         Ref Range Bilirubin UA, confirm                             NEGATIVE                      NEG                       
-CBC WITH AUTOMATED DIFF Collection Time: 08/28/18 10:01 PM 
     Result                                            Value                         Ref Range WBC                                               9.6                           3.6 - 11.0 K/uL           
     RBC                                               5.24 (H)                      3.80 - 5.20 M/uL HGB                                               15.1                          11.5 - 16.0 g/dL HCT                                               46.1                          35.0 - 47.0 %      MCV                                               88.0                          80.0 - 99.0 FL            
     MCH                                               28.8                          26.0 - 34.0 PG            
 MCHC                                              32.8                          30.0 - 36.5 g/dL RDW                                               13.1                          11.5 - 14.5 % PLATELET                                          190                           150 - 400 K/uL MPV                                               12.2                          8.9 - 12.9 FL             
     NRBC                                              0.0                           0  WBC ABSOLUTE NRBC                                     0.00                          0.00 - 0.01 K/uL NEUTROPHILS                                       69                            32 - 75 % LYMPHOCYTES                                       17                            12 - 49 % MONOCYTES                                         12                            5 - 13 % EOSINOPHILS                                       1                             0 - 7 % BASOPHILS                                         1                             0 - 1 % IMMATURE GRANULOCYTES                             0                             0.0 - 0.5 % ABS. NEUTROPHILS                                  6.6                           1.8 - 8.0 K/UL            
     ABS. LYMPHOCYTES                                  1.6                           0.8 - 3.5 K/UL            
     ABS. MONOCYTES                                    1.2 (H)                       0.0 - 1.0 K/UL            
     ABS. EOSINOPHILS                                  0.1                           0.0 - 0.4 K/UL            
     ABS. BASOPHILS                                    0.1                           0.0 - 0.1 K/UL ABS. IMM. GRANS.                                  0.0                           0.00 - 0.04 K/UL          
     DF                                                AUTOMATED                                               
-METABOLIC PANEL, COMPREHENSIVE Collection Time: 08/28/18 10:01 PM 
     Result                                            Value                         Ref Range Sodium                                            140                           136 - 145 mmol/L Potassium                                         4.2                           3.5 - 5.1 mmol/L Chloride                                          108                           97 - 108 mmol/L           
     CO2                                               24                            21 - 32 mmol/L Anion gap                                         8                             5 - 15 mmol/L Glucose                                           102 (H)                       65 - 100 mg/dL BUN                                               23 (H)                        6 - 20 MG/DL Creatinine                                        1. 05 (H)                      0.55 - 1.02 MG/DL         
     BUN/Creatinine ratio                              22 (H)                        12 - 20 GFR est AA                                        >60                           >60 ml/min/1.73m2 GFR est non-AA                                    50 (L)                        >60 ml/min/1.73m2 Calcium                                           8.8                           8.5 - 10.1 MG/DL      Bilirubin, total                                  0.9                           0.2 - 1.0 MG/DL           
     ALT (SGPT)                                        12 12 - 78 U/L               
     AST (SGOT)                                        14 (L)                        15 - 37 U/L Alk. phosphatase                                  90                            45 - 117 U/L Protein, total                                    6.9                           6.4 - 8.2 g/dL Albumin                                           3.7                           3.5 - 5.0 g/dL Globulin                                          3.2                           2.0 - 4.0 g/dL A-G Ratio                                         1.2                           1.1 - 2.2                 
-TROPONIN I Collection Time: 08/28/18 10:01 PM 
     Result                                            Value                         Ref Range Troponin-I, Qt.                                   <0.05                         <0.05 ng/mL               
-SAMPLES BEING HELD Collection Time: 08/28/18 10:02 PM 
     Result                                            Value                         Ref Range SAMPLES BEING HELD                                gold,bldcs,  blue qns COMMENT Add-on orders for these samples will be processed based on acceptable specimen integrity and analyte stability, which may vary by analyte. -SAMPLES BEING HELD Collection Time: 08/28/18 11:10 PM 
     Result                                            Value                         Ref Range SAMPLES BEING HELD                                [id number] [facility] COMMENT Add-on orders for these samples will be processed based on acceptable specimen integrity and analyte stability, which may vary by analyte. IMAGING RESULTS: 
See chart MEDICATIONS GIVEN: 
Medications - No data to display IMPRESSION: 
Urinary tract infection without hematuria, site unspecified  (primary encounter diagnosis) Hallucination Confusion PLAN: 
1. Admit to hospital for UTI Amount and/or Complexity of Data Reviewed Clinical lab tests: ordered and reviewed Tests in the radiology section of CPT®: ordered and reviewed Tests in the medicine section of CPT®: ordered and reviewed ED Course Procedures CONSULT NOTE:  
11:37 PM 
Hayes Whalen PA-C spoke with Dr. Allen Mcginnis, Specialty: Hospitalist 
Discussed pt's hx, disposition, and available diagnostic and imaging results. Reviewed care plans. Consultant agrees with plans as outlined. Will see for admission.

## 2018-08-30 VITALS
BODY MASS INDEX: 32.66 KG/M2 | RESPIRATION RATE: 16 BRPM | SYSTOLIC BLOOD PRESSURE: 163 MMHG | TEMPERATURE: 97.3 F | WEIGHT: 173 LBS | DIASTOLIC BLOOD PRESSURE: 70 MMHG | HEART RATE: 48 BPM | OXYGEN SATURATION: 98 % | HEIGHT: 61 IN

## 2018-08-30 PROBLEM — R41.0 CONFUSION: Status: RESOLVED | Noted: 2018-08-28 | Resolved: 2018-08-30

## 2018-08-30 LAB
BACTERIA SPEC CULT: NORMAL
CC UR VC: NORMAL
SERVICE CMNT-IMP: NORMAL

## 2018-08-30 PROCEDURE — 97116 GAIT TRAINING THERAPY: CPT

## 2018-08-30 PROCEDURE — 96372 THER/PROPH/DIAG INJ SC/IM: CPT

## 2018-08-30 PROCEDURE — 97535 SELF CARE MNGMENT TRAINING: CPT

## 2018-08-30 PROCEDURE — 74011250636 HC RX REV CODE- 250/636: Performed by: INTERNAL MEDICINE

## 2018-08-30 PROCEDURE — 74011250637 HC RX REV CODE- 250/637: Performed by: INTERNAL MEDICINE

## 2018-08-30 PROCEDURE — 99218 HC RM OBSERVATION: CPT

## 2018-08-30 PROCEDURE — 96361 HYDRATE IV INFUSION ADD-ON: CPT

## 2018-08-30 PROCEDURE — 97530 THERAPEUTIC ACTIVITIES: CPT

## 2018-08-30 RX ORDER — OLANZAPINE 2.5 MG/1
2.5 TABLET ORAL
Qty: 30 TAB | Refills: 2 | Status: SHIPPED | OUTPATIENT
Start: 2018-08-30 | End: 2018-10-16 | Stop reason: SDUPTHER

## 2018-08-30 RX ADMIN — ACETAMINOPHEN 650 MG: 325 TABLET ORAL at 09:42

## 2018-08-30 RX ADMIN — ENOXAPARIN SODIUM 40 MG: 40 INJECTION, SOLUTION INTRAVENOUS; SUBCUTANEOUS at 09:16

## 2018-08-30 RX ADMIN — SODIUM CHLORIDE 75 ML/HR: 900 INJECTION, SOLUTION INTRAVENOUS at 05:29

## 2018-08-30 NOTE — ROUTINE PROCESS
Bedside and Verbal shift change report given to Carrol Garsia (oncoming nurse) by [address] [place] [address] (offgoing nurse). Report included the following information SBAR, Kardex, Accordion and Recent Results.

## 2018-08-30 NOTE — PROGRESS NOTES
Problem: Self Care Deficits Care Plan (Adult) Goal: *Acute Goals and Plan of Care (Insert Text) Occupational Therapy Goals Initiated 8/29/2018 1. Patient will perform lower body dressing with modified independence within 7 day(s). 2.  Patient will perform grooming, standing at sink, with modified independence within 7 day(s). 3.  Patient will perform toilet transfers with modified independence within 7 day(s). 4.  Patient will perform all aspects of toileting with modified independence within 7 day(s). 5.  Patient will participate in upper extremity therapeutic exercise/activities with modified independence for 10 minutes within 7 day(s). Occupational Therapy TREATMENT Patient: Sera Jackson (32 y.o. female) Date: 8/30/2018 Diagnosis: Confusion Confusion Precautions:   
Chart, occupational therapy assessment, plan of care, and goals were reviewed. ASSESSMENT: 
Patient received in bedside chair, agreeable to OT. Patient reports plan for discharge home today. Patient requesting toileting and grooming tasks today. She is able to perform sit to stand with stand by assist, transfers to commode in bathroom with CGA and able to manage toileting tasks at supervision. She manages standing grooming tasks with supervision and returns to chair in room. Patient plans to return to daughter's home at discharge. Recommend HH. Progression toward goals: 
[x]       Improving appropriately and progressing toward goals 
[]       Improving slowly and progressing toward goals 
[]       Not making progress toward goals and plan of care will be adjusted PLAN: 
Patient continues to benefit from skilled intervention to address the above impairments. Continue treatment per established plan of care. Discharge Recommendations:  Home Health Further Equipment Recommendations for Discharge:  TBD SUBJECTIVE:  
Patient stated I think I will go home today .  OBJECTIVE DATA SUMMARY:  
 Cognitive/Behavioral Status: 
Neurologic State: Alert Orientation Level: Oriented X4 Cognition: Follows commands Perception: Appears intact Perseveration: No perseveration noted Safety/Judgement: Awareness of environment Functional Mobility and Transfers for ADLs: 
Bed Mobility: 
  
 
Transfers: 
Sit to Stand: Stand-by assistance Functional Transfers Bathroom Mobility: Stand-by assistance Toilet Transfer : Contact guard assistance Cues: Physical assistance;Verbal cues provided Balance: 
Sitting: Intact Standing: Intact; With support ADL Intervention: 
  
 
Grooming Grooming Assistance: Supervision/set up Washing Face: Supervision/set-up Brushing Teeth: Supervision/set-up Brushing/Combing Hair: Supervision/set-up Lower Body Dressing Assistance Socks: Contact guard assistance (increased time) Leg Crossed Method Used: No 
Position Performed: Bending forward method Toileting Toileting Assistance: Supervision/set up Bladder Hygiene: Supervision/set-up Clothing Management: Supervision/set-up Cognitive Retraining Safety/Judgement: Awareness of environment Neuro Re-Education: 
  
  
  
Therapeutic Exercises:  
 
Pain: 
Pain Scale 1: Numeric (0 - 10) Pain Intensity 1: 4 Pain Location 1: Head 
  
Pain Description 1: Aching Pain Intervention(s) 1: Medication (see MAR) Activity Tolerance: VSS Please refer to the flowsheet for vital signs taken during this treatment. After treatment:  
[x] Patient left in no apparent distress sitting up in chair 
[] Patient left in no apparent distress in bed 
[x] Call bell left within reach [x] Nursing notified 
[] Caregiver present 
[] Bed alarm activated COMMUNICATION/COLLABORATION:  
The patients plan of care was discussed with: Physical Therapist and Registered Nurse MANUEL Vega/L Time Calculation: 24 mins

## 2018-08-30 NOTE — DISCHARGE SUMMARY
Hospitalist Discharge Summary     Patient ID:    Karlie Lowe  [id number]  33 y.o.  7/20/1931    Admit date: 8/28/2018    Discharge date and time: 8/30/2018    Admission Diagnoses: Confusion    Chronic Diagnoses:    Problem List as of 8/30/2018  Date Reviewed: 8/30/2018          Codes Class Noted - Resolved    Glaucoma ICD-10-CM: H40.9  ICD-9-CM: 365.9  Unknown - Present        Psychophysiological insomnia ICD-10-CM: F51.04  ICD-9-CM: 307.42  12/27/2017 - Present        Depression ICD-10-CM: F32.9  ICD-9-CM: 36  Unknown - Present        Essential hypertension ICD-10-CM: I10  ICD-9-CM: 401.9  3/11/2016 - Present        GERD (gastroesophageal reflux disease) ICD-10-CM: K21.9  ICD-9-CM: 530.81  Unknown - Present        Esophageal stricture ICD-10-CM: K22.2  ICD-9-CM: 530.3  Unknown - Present        DJD (degenerative joint disease), lumbar ICD-10-CM: M47.816  ICD-9-CM: 721.3  Unknown - Present        Macular degeneration ICD-10-CM: H35.30  ICD-9-CM: 362.50  Unknown - Present    Overview Addendum 5/15/2012  2:03 PM by Barbara Lynch     macular degeneration vs optic neuropathy             ABAD (obstructive sleep apnea) ICD-10-CM: G47.33  ICD-9-CM: 327.23  8/14/2011 - Present        RESOLVED: HTN (hypertension) ICD-10-CM: I10  ICD-9-CM: 401.9  Unknown - 8/29/2018        * (Principal)RESOLVED: Confusion ICD-10-CM: R41.0  ICD-9-CM: 298.9  8/28/2018 - 8/30/2018        RESOLVED: Type 2 diabetes with nephropathy ([facility].) ICD-10-CM: E11.21  ICD-9-CM: 250.40, 583.81  5/30/2018 - 5/30/2018        RESOLVED: Type 2 diabetes mellitus with proliferative retinopathy ([facility].) ICD-10-CM: Q64.6302  ICD-9-CM: 250.50, 362.02  5/30/2018 - 5/30/2018        RESOLVED: Advanced care planning/counseling discussion ICD-10-CM: U53.95  ICD-9-CM: V65.49  5/30/2018 - 8/29/2018    Overview Signed 5/30/2018 11:06 AM by James Nieves RN     Patient states that a completed Advanced Medical Directive is at home.  NN encouraged patient to bring a copy of the completed Advanced Medical Directive to the office for scanning into the medical record. Patient verbalized understanding & agreement. RESOLVED: Type 2 diabetes mellitus with proliferative retinopathy ([facility].) ICD-10-CM: O10.5420  ICD-9-CM: 250.50, 362.02  4/11/2018 - 5/30/2018        RESOLVED: Type 2 diabetes mellitus with nephropathy ([facility].) ICD-10-CM: E11.21  ICD-9-CM: 250.40, 583.81  1/30/2018 - 5/30/2018        RESOLVED: Type 2 diabetes mellitus with diabetic retinopathy ([facility].) ICD-10-CM: E11.319  ICD-9-CM: 250.50, 362.01  1/30/2018 - 5/30/2018        RESOLVED: Hyperkalemia ICD-10-CM: E87.5  ICD-9-CM: 276.7  1/30/2018 - 5/30/2018        RESOLVED: S/P laparoscopic cholecystectomy ICD-10-CM: Z90.49  ICD-9-CM: V45.89  8/1/2017 - 8/29/2018        RESOLVED: Gallstones ICD-10-CM: K80.20  ICD-9-CM: 574.20  7/31/2017 - 8/29/2018        RESOLVED: Diabetes mellitus without complication ([facility] ECD-91-IRLANDA: E11.9  ICD-9-CM: 250.00  3/11/2016 - 5/30/2018        RESOLVED: Psychotic depression ([facility].) ICD-10-CM: F32.3  ICD-9-CM: 296.20  12/21/2015 - 8/29/2018        RESOLVED: Chronic cough ICD-10-CM: R05  ICD-9-CM: 786.2  9/14/2015 - 8/29/2018        RESOLVED: DM (diabetes mellitus) ([facility].) ICD-10-CM: E11.9  ICD-9-CM: 250.00  9/13/2015 - 3/11/2016    Overview Signed 9/13/2015  6:24 AM by Josefina Pham     2015 A1C=7.0. RESOLVED: Diastolic dysfunction [facility]-15-EH: I51.9  ICD-9-CM: 429.9  12/19/2012 - 8/29/2018    Overview Signed 12/19/2012 11:17 AM by Josefina Pham     2012 ECHO mild.              RESOLVED: Osteopenia ICD-10-CM: M85.80  ICD-9-CM: 733.90  Unknown - 8/29/2018        RESOLVED: Iron deficiency anemia ICD-10-CM: D50.9  ICD-9-CM: 280.9  5/8/2012 - 8/29/2018        RESOLVED: Sinus node dysfunction ([facility] ICD-10-CM: I49.5  ICD-9-CM: 427.81  11/14/2011 - 8/29/2018    Overview Signed 11/14/2011  1:49 PM by Ines Mccullough LPN     16/1/[id number] Rochelle ZAMORA.             RESOLVED: Venous insufficiency ICD-10-CM: B48.1  ICD-9-CM: 459.81  10/10/2011 - 8/29/2018        RESOLVED: HTN (hypertension) ICD-10-CM: I10  ICD-9-CM: 401.9  8/14/2011 - 3/11/2016    Overview Signed 9/20/2011  9:55 AM by Heather Knight LPN     6/08/[id number] 55-60%. Tech difficult study with poor acoustic window. Left atrium mildly dilated. RESOLVED: Palpitation ICD-10-CM: R00.2  ICD-9-CM: 785.1  8/14/2011 - 8/29/2018    Overview Signed 10/7/2011 11:23 AM by Heather Knight LPN     43/9/[id number] 72%. With no ischemia. Discharge Medications:   Current Discharge Medication List      START taking these medications    Details   OLANZapine (ZYPREXA) 2.5 mg tablet Take 1 Tab by mouth nightly. Qty: 30 Tab, Refills: 2         CONTINUE these medications which have NOT CHANGED    Details   spironolactone (ALDACTONE) 50 mg tablet Take 50 mg by mouth every evening. acetaminophen (TYLENOL) 500 mg tablet Take 500 mg by mouth every eight (8) hours as needed for Pain.      mirtazapine (REMERON) 15 mg tablet 1-2 tabs at bedtime prn  Qty: 40 Tab, Refills: 2    Associated Diagnoses: Psychophysiological insomnia      CARBOXYMETHYLCELLULOSE SODIUM (REFRESH OP) Apply 1 Drop to eye as needed (dry eye). latanoprost (XALATAN) 0.005 % ophthalmic solution Administer 1 Drop to both eyes nightly. STOP taking these medications       bumetanide (BUMEX) 0.5 mg tablet Comments:   Reason for Stopping: Follow up Care:    1. Boaz Troncoso MD in 1-2 weeks  2. Psychiatry     Diet:  Cardiac Diet    Disposition:  Home. Advanced Directive:    Discharge Exam:  See today's note.     CONSULTATIONS: Neurology and Psychiatry    Significant Diagnostic Studies:   Recent Labs      08/28/18   2201   WBC  9.6   HGB  15.1   HCT  46.1   PLT  190     Recent Labs      08/28/18   2201   NA  140   K  4.2   CL  108   CO2  24   BUN  23*   CREA  1.05*   GLU  102*   CA  8.8     Recent Labs 08/28/18   2201   SGOT  14*   ALT  12   AP  90   TBILI  0.9   TP  6.9   ALB  3.7   GLOB  3.2     No results for input(s): INR, PTP, APTT in the last 72 hours. No lab exists for component: INREXT   No results for input(s): FE, TIBC, PSAT, FERR in the last 72 hours. No results for input(s): PH, PCO2, PO2 in the last 72 hours. No results for input(s): CPK, CKMB in the last 72 hours. No lab exists for component: TROPONINI  Lab Results   Component Value Date/Time    Glucose (POC) 166 (H) 05/26/2010 10:16 AM             HOSPITAL COURSE & TREATMENT RENDERED:   1. Hallucination/Psychophysiological insomnia. ?? 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 Added Zyprexa by psychiatry. Continue usual remeron. UA is normal.   according to the daughter's description, pt sees people trying to hurt her. She stays in her house locked, putting furniture to block the door and afraid of being hurt. Very paranoid. FU with psychiatry.      2. ABAD (obstructive sleep apnea) - Resume home CPA       3. Essential hypertension - Hold bumex and spironolactone and hydrate. Continue metoprolol.        4. GERD (gastroesophageal reflux disease) / Esophageal stricture - PPI if symptoms      5. DJD (degenerative joint disease), lumbar - Tylenol prn. Avoid narcotics       6. Macular degeneration / Glaucoma - Xalatan    7.  sinus bradycardia. Asymptomatic. Avoid AV blocking agents.      Discharged in improved condition       Signed:  Catherine Huertas MD  8/30/2018  11:27 AM

## 2018-08-30 NOTE — PROGRESS NOTES
Problem: Mobility Impaired (Adult and Pediatric) Goal: *Acute Goals and Plan of Care (Insert Text) Physical Therapy Goals Initiated 8/29/2018 1. Patient will transfer from bed to chair and chair to bed with modified independence using the least restrictive device within 3 day(s). 3.  Patient will perform sit to stand with modified independence within 3 day(s). 4.  Patient will ambulate with modified independence for 250 feet with the least restrictive device within 3 day(s). 5.  Patient will ascend/descend 12 stairs with 1 handrail(s) with minimal assistance/contact guard assist within 3 day(s). physical Therapy TREATMENT Patient: Lora Winston (44 y.o. female) Date: 8/30/2018 Diagnosis: Confusion Confusion Precautions:   
Chart, physical therapy assessment, plan of care and goals were reviewed. ASSESSMENT: 
Pt seen this AM.Pt comes to stand with CGA. Pt ambulated 90ft with RW CGA. Pts balance fair to good on level surface. Pt left sitting. Pt progressing with mobility. Continue goals. Progression toward goals: 
[]    Improving appropriately and progressing toward goals [x]    Improving slowly and progressing toward goals 
[]    Not making progress toward goals and plan of care will be adjusted PLAN: 
Patient continues to benefit from skilled intervention to address the above impairments. Continue treatment per established plan of care. Discharge Recommendations:  Home Health Further Equipment Recommendations for Discharge:  rolling walker SUBJECTIVE:  
 
 
OBJECTIVE DATA SUMMARY:  
Critical Behavior: 
Neurologic State: Alert Orientation Level: Disoriented X4 Cognition: Appropriate decision making, Appropriate for age attention/concentration, Appropriate safety awareness, Follows commands, Recognition of people/places Functional Mobility Training: 
  
  
  
Transfers: 
  
 Pt CGA sit to stand. Balance: 
Sitting: Intact Standing: Intact; With support Ambulation/Gait Training: 
Distance (ft): 90 Feet (ft) Assistive Device: Walker, rolling;Gait belt Ambulation - Level of Assistance: Contact guard assistance Base of Support: Narrowed Speed/Caterina: Pace decreased (<100 feet/min) Step Length: Left shortened;Right shortened Pain: 
Pain Scale 1: Numeric (0 - 10) Pain Intensity 1: 4 Pain Location 1: Head 
  
Pain Description 1: Aching Pain Intervention(s) 1: Medication (see MAR) Activity Tolerance:  
Pt tolerated treatment well. Please refer to the flowsheet for vital signs taken during this treatment. After treatment:  
[x]    Patient left in no apparent distress sitting up in chair 
[]    Patient left in no apparent distress in bed 
[]    Call bell left within reach 
[]    Nursing notified 
[]    Caregiver present 
[]    Bed alarm activated COMMUNICATION/COLLABORATION:  
The patients plan of care was discussed with: Physical Therapist 
 
Real Noonan PTA Time Calculation: 23 mins

## 2018-08-30 NOTE — DISCHARGE INSTRUCTIONS
ACUTE DIAGNOSES:  Confusion    CHRONIC MEDICAL DIAGNOSES:  Problem List as of 8/30/2018  Date Reviewed: 8/30/2018          Codes Class Noted - Resolved    Glaucoma ICD-10-CM: H40.9  ICD-9-CM: 365.9  Unknown - Present        Psychophysiological insomnia ICD-10-CM: F51.04  ICD-9-CM: 307.42  12/27/2017 - Present        Depression ICD-10-CM: F32.9  ICD-9-CM: 36  Unknown - Present        Essential hypertension ICD-10-CM: I10  ICD-9-CM: 401.9  3/11/2016 - Present        GERD (gastroesophageal reflux disease) ICD-10-CM: K21.9  ICD-9-CM: 530.81  Unknown - Present        Esophageal stricture ICD-10-CM: K22.2  ICD-9-CM: 530.3  Unknown - Present        DJD (degenerative joint disease), lumbar ICD-10-CM: M47.816  ICD-9-CM: 721.3  Unknown - Present        Macular degeneration ICD-10-CM: H35.30  ICD-9-CM: 362.50  Unknown - Present    Overview Addendum 5/15/2012  2:03 PM by Keshawn Crum     macular degeneration vs optic neuropathy             ABAD (obstructive sleep apnea) ICD-10-CM: G47.33  ICD-9-CM: 327.23  8/14/2011 - Present        RESOLVED: HTN (hypertension) ICD-10-CM: I10  ICD-9-CM: 401.9  Unknown - 8/29/2018        * (Principal)RESOLVED: Confusion ICD-10-CM: R41.0  ICD-9-CM: 298.9  8/28/2018 - 8/30/2018        RESOLVED: Type 2 diabetes with nephropathy ([facility] ICD-10-CM: E11.21  ICD-9-CM: 250.40, 583.81  5/30/2018 - 5/30/2018        RESOLVED: Type 2 diabetes mellitus with proliferative retinopathy ([facility] ICD-10-CM: N85.9377  ICD-9-CM: 250.50, 362.02  5/30/2018 - 5/30/2018        RESOLVED: Advanced care planning/counseling discussion ICD-10-CM: O07.39  ICD-9-CM: V65.49  5/30/2018 - 8/29/2018    Overview Signed 5/30/2018 11:06 AM by Riley Pérez RN     Patient states that a completed Advanced Medical Directive is at home. NN encouraged patient to bring a copy of the completed Advanced Medical Directive to the office for scanning into the medical record. Patient verbalized understanding & agreement. RESOLVED: Type 2 diabetes mellitus with proliferative retinopathy ([place] [address].) ICD-10-CM: Y14.5261  ICD-9-CM: 250.50, 362.02  4/11/2018 - 5/30/2018        RESOLVED: Type 2 diabetes mellitus with nephropathy ([facility].) ICD-10-CM: E11.21  ICD-9-CM: 250.40, 583.81  1/30/2018 - 5/30/2018        RESOLVED: Type 2 diabetes mellitus with diabetic retinopathy ([facility].) ICD-10-CM: E11.319  ICD-9-CM: 250.50, 362.01  1/30/2018 - 5/30/2018        RESOLVED: Hyperkalemia ICD-10-CM: E87.5  ICD-9-CM: 276.7  1/30/2018 - 5/30/2018        RESOLVED: S/P laparoscopic cholecystectomy ICD-10-CM: Z90.49  ICD-9-CM: V45.89  8/1/2017 - 8/29/2018        RESOLVED: Gallstones ICD-10-CM: K80.20  ICD-9-CM: 574.20  7/31/2017 - 8/29/2018        RESOLVED: Diabetes mellitus without complication ([place] [address] XBK-29-TY: E11.9  ICD-9-CM: 250.00  3/11/2016 - 5/30/2018        RESOLVED: Psychotic depression ([facility].) ICD-10-CM: F32.3  ICD-9-CM: 296.20  12/21/2015 - 8/29/2018        RESOLVED: Chronic cough ICD-10-CM: R05  ICD-9-CM: 786.2  9/14/2015 - 8/29/2018        RESOLVED: DM (diabetes mellitus) ([facility].) ICD-10-CM: E11.9  ICD-9-CM: 250.00  9/13/2015 - 3/11/2016    Overview Signed 9/13/2015  6:24 AM by Capo Gallagher     2015 A1C=7.0. RESOLVED: Diastolic dysfunction [facility]-25-AE: I51.9  ICD-9-CM: 429.9  12/19/2012 - 8/29/2018    Overview Signed 12/19/2012 11:17 AM by Capo Gallagher     2012 ECHO mild.              RESOLVED: Osteopenia ICD-10-CM: M85.80  ICD-9-CM: 733.90  Unknown - 8/29/2018        RESOLVED: Iron deficiency anemia ICD-10-CM: D50.9  ICD-9-CM: 280.9  5/8/2012 - 8/29/2018        RESOLVED: Sinus node dysfunction ([address] ICD-10-CM: I49.5  ICD-9-CM: 427.81  11/14/2011 - 8/29/2018    Overview Signed 11/14/2011  1:49 PM by Jessica Rossi LPN     43/7/[id number] Rochelle ZAMORA.             RESOLVED: Venous insufficiency ICD-10-CM: I64.8  ICD-9-CM: 459.81  10/10/2011 - 8/29/2018        RESOLVED: HTN (hypertension) ICD-10-CM: I10  ICD-9-CM: 401.9  8/14/2011 - 3/11/2016    Overview Signed 9/20/2011  9:55 AM by Barbara Mak LPN     7/98/[id number] 55-60%. Tech difficult study with poor acoustic window. Left atrium mildly dilated. RESOLVED: Palpitation ICD-10-CM: R00.2  ICD-9-CM: 785.1  8/14/2011 - 8/29/2018    Overview Signed 10/7/2011 11:23 AM by Barbara Mak LPN     30/7/[id number]-UPBR 72%. With no ischemia. DISCHARGE MEDICATIONS:          · It is important that you take the medication exactly as they are prescribed. · Keep your medication in the bottles provided by the pharmacist and keep a list of the medication names, dosages, and times to be taken in your wallet. · Do not take other medications without consulting your doctor. DIET:  Cardiac Diet    ACTIVITY: Activity as tolerated    ADDITIONAL INFORMATION: If you experience any of the following symptoms then please call your primary care physician or return to the emergency room if you cannot get hold of your doctor: Fever, chills, nausea, vomiting, diarrhea, change in mentation, falling, bleeding, shortness of breath. FOLLOW UP CARE:  Dr. Seun Saleh MD  you are to call and set up an appointment to see them in 2 weeks. Follow-up with psychiatry, Tiffanie Chairez MD as needed [phone]      Information obtained by :  I understand that if any problems occur once I am at home I am to contact my physician. I understand and acknowledge receipt of the instructions indicated above.                                                                                                                                            Physician's or R.N.'s Signature                                                                  Date/Time                                                                                                                                              Patient or Representative Signature Date/Time

## 2018-08-30 NOTE — PROGRESS NOTES
Palomo Suresh [facility] 
[address] [facility], [place], [address] [place] [address] 
[phone] Medical Progress Note NAME: Abby Kiran :  1931 MRM:  [id number] Date/Time: 2018  8:59 AM 
 
  
Assessment and Plan: 1. Hallucination/Psychophysiological insomnia. ?? 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 Added Zyprexa by psychiatry. Continue usual remeron. UA is normal.  
according to the daughter's description, pt sees people trying to heart her. She stays in her house locked, putting furniture to block the door and very afraid of being hurt. Very paranoid. 2.  ABAD (obstructive sleep apnea) - Resume home CPA  
  
3.  Essential hypertension - Hold bumex and spironolactone and hydrate. Continue metoprolol.   
  
4. GERD (gastroesophageal reflux disease) / Esophageal stricture - PPI if symptoms 
  
5. DJD (degenerative joint disease), lumbar - Tylenol prn. Avoid narcotics  
  
6. Macular degeneration / Glaucoma - Xalatan 7.  sinus bradycardia. Asymptomatic. Avoid AV blocking agents. Subjective: Chief Complaint:  Follow up of pt who was admitted with hallucination. Alert and oriented. Visual hallucination is intermittent ROS: 
(bold if positive, if negative) Tolerating PT  Tolerating Diet Objective:  
 
Last 24hrs VS reviewed since prior progress note. Most recent are: 
 
Visit Vitals  /82 (BP 1 Location: Left arm, BP Patient Position: At rest)  Pulse (!) 52  Temp 97.7 °F (36.5 °C)  Resp 16  
 Ht 5' 1\" (1.549 m)  Wt 78.5 kg (173 lb)  SpO2 94%  BMI 32.69 kg/m2 SpO2 Readings from Last 6 Encounters:  
18 94% 18 95% 18 96% 18 95% 18 96% 18 97% Intake/Output Summary (Last 24 hours) at 18 1532 Last data filed at 18 3200 Gross per 24 hour Intake                0 ml Output              400 ml Net             -400 ml Physical Exam: 
 
Gen:  Well-developed, well-nourished, in no acute distress HEENT:  Pink conjunctivae, PERRL, hearing intact to voice, moist mucous membranes Neck:  Supple, without masses, thyroid non-tender Resp:  No accessory muscle use, clear breath sounds without wheezes rales or rhonchi 
Card:  No murmurs, normal S1, S2 without thrills, bruits or peripheral edema Abd:  Soft, non-tender, non-distended, normoactive bowel sounds are present, no palpable organomegaly and no detectable hernias Lymph:  No cervical or inguinal adenopathy Musc:  No cyanosis or clubbing Skin:  No rashes or ulcers, skin turgor is good Neuro:  Cranial nerves are grossly intact, no focal motor weakness, follows commands appropriately Psych:  Good insight, oriented to person, place and time, alert 
__________________________________________________________________ Medications Reviewed: (see below) Medications:  
 
Current Facility-Administered Medications Medication Dose Route Frequency  acetaminophen (TYLENOL) tablet 500 mg  500 mg Oral Q8H PRN  
 OLANZapine (ZyPREXA) tablet 2.5 mg  2.5 mg Oral QHS  latanoprost (XALATAN) 0.005 % ophthalmic solution 1 Drop  1 Drop Both Eyes QHS  
 0.9% sodium chloride infusion  75 mL/hr IntraVENous CONTINUOUS  
 acetaminophen (TYLENOL) tablet 650 mg  650 mg Oral Q4H PRN  
 diphenhydrAMINE (BENADRYL) capsule 25 mg  25 mg Oral Q4H PRN  
 ondansetron (ZOFRAN) injection 4 mg  4 mg IntraVENous Q4H PRN  
 enoxaparin (LOVENOX) injection 40 mg  40 mg SubCUTAneous Q24H Lab Data Reviewed: (see below) Lab Review:  
 
Recent Labs  
   08/28/18 2201 WBC  9.6 HGB  15.1 HCT  46.1 PLT  190 Recent Labs  
   08/28/18 2201 NA  140  
K  4.2 CL  108 CO2  24 GLU  102* BUN  23* CREA  1.05* CA  8.8 ALB  3.7 TBILI  0.9 SGOT  14* ALT  12 Lab Results Component Value Date/Time  Glucose (POC) 166 (H) 05/26/2010 10:16 AM  
 
 No results for input(s): PH, PCO2, PO2, HCO3, FIO2 in the last 72 hours. No results for input(s): INR in the last 72 hours. No lab exists for component: INREXT, INREXT All Micro Results Procedure Component Value Units Date/Time CULTURE, BLOOD [[id number]] Collected:  08/28/18 2202 Order Status:  Completed Specimen:  Blood from Blood Updated:  08/30/18 1170 Special Requests: NO SPECIAL REQUESTS Culture result: NO GROWTH 1 DAY     
 CULTURE, URINE [[id number]] Collected:  08/29/18 0053 Order Status:  Completed Specimen:  Urine from Cath Urine Updated:  08/29/18 7981 CULTURE, URINE [[id number]] Collected:  08/28/18 2310 Order Status:  Canceled Specimen:  Urine from Cath Urine Updated:  08/29/18 5511 CULTURE, URINE [[id number]] Order Status:  Canceled Specimen:  Urine from Clean catch CULTURE, BLOOD [[id number]] Order Status:  Canceled Specimen:  Blood CULTURE, URINE [[id number]] Order Status:  Canceled Specimen:  Urine from Clean catch URINE CULTURE HOLD SAMPLE [[id number]] Order Status:  Canceled Specimen:  Urine URINE CULTURE HOLD SAMPLE [[id number]] Collected:  08/28/18 2157 Order Status:  Canceled Specimen:  Urine I have reviewed notes of prior 24hr. Other pertinent lab: Total time spent with patient: 28 Care Plan discussed with: Patient, Nursing Staff and >50% of time spent in counseling and coordination of care Discussed:  Care Plan Prophylaxis:  Lovenox Disposition:  Home w/Family 
        
___________________________________________________ Attending Physician: Luis Lou MD

## 2018-08-30 NOTE — PROGRESS NOTES
8/30/2018   CARE MANAGEMENT NOTE:  Pt to discharge home without any anticipated post discharge needs. CM notified Nurse Navigator of pt's return to home. Grace

## 2018-08-30 NOTE — PROGRESS NOTES
I have reviewed discharge instructions with the patient and daughter. The patient and daughter verbalized understanding. Discharge Medication List as of 8/30/2018 12:44 PM  
  
START taking these medications Details OLANZapine (ZYPREXA) 2.5 mg tablet Take 1 Tab by mouth nightly. , Print, Disp-30 Tab, R-2  
  
  
CONTINUE these medications which have NOT CHANGED Details  
spironolactone (ALDACTONE) 50 mg tablet Take 50 mg by mouth every evening., Historical Med  
  
acetaminophen (TYLENOL) 500 mg tablet Take 500 mg by mouth every eight (8) hours as needed for Pain., Historical Med  
  
mirtazapine (REMERON) 15 mg tablet 1-2 tabs at bedtime prn, Normal, Disp-40 Tab, R-2  
  
CARBOXYMETHYLCELLULOSE SODIUM (REFRESH OP) Apply 1 Drop to eye as needed (dry eye). , Historical Med  
  
latanoprost (XALATAN) 0.005 % ophthalmic solution Administer 1 Drop to both eyes nightly., Historical Med  
  
  
STOP taking these medications  
  
 bumetanide (BUMEX) 0.5 mg tablet Comments:  
Reason for Stopping:

## 2018-09-04 ENCOUNTER — PATIENT OUTREACH (OUTPATIENT)
Dept: INTERNAL MEDICINE CLINIC | Age: 83
End: 2018-09-04

## 2018-09-04 LAB
BACTERIA SPEC CULT: NORMAL
SERVICE CMNT-IMP: NORMAL

## 2018-09-04 NOTE — PROGRESS NOTES
Hospital Discharge Follow-Up Date/Time:  2018 4:22 PM 
 
Patient was admitted to [facility] on  and discharged on  for Confusion. The physician discharge summary was available at the time of outreach. Patient was contacted within 2 business days of discharge. Top Challenges reviewed with the provider On new antipsychotic Zyprexa for hallucinations. Has not seen outpatient psychiatrist.  
 
Method of communication with provider :chart routing Inpatient RRAT score: NA Was this a readmission? no  
Patient stated reason for the readmission: NA 
 
Nurse Navigator (NN) contacted the family by telephone to perform post hospital discharge assessment. Verified name and  with family as identifiers. Provided introduction to self, and explanation of the Nurse Navigator role. Reviewed discharge instructions and red flags with family who verbalized understanding. Patient given an opportunity to ask questions and does not have any further questions or concerns at this time. The family agrees to contact the PCP office for questions related to their healthcare. NN provided contact information for future reference. Disease Specific:   N/A Summary of patient's top problems: 
1. On new antipsychotic medication Home Health orders at discharge: None ordered at discharge Home Health company: Has been seen by Jas Vaughan for PT/OT for gait instability. Last visit  patient discharged for met goals. Date of initial visit: NA  
 
Durable Medical Equipment ordered/company: NA 
Durable Medical Equipment received: NA Barriers to care? None noted at this time. Advance Care Planning:  
Does patient have an Advance Directive:  not on file Medication(s):  
New Medications at Discharge: Zyprexa Changed Medications at Discharge: None Discontinued Medications at Discharge: None Medication reconciliation was performed with patient, who verbalizes understanding of administration of home medications. There were no barriers to obtaining medications identified at this time. Referral to Pharm D needed: no  
 
Current Outpatient Prescriptions Medication Sig  OLANZapine (ZYPREXA) 2.5 mg tablet Take 1 Tab by mouth nightly.  spironolactone (ALDACTONE) 50 mg tablet Take 50 mg by mouth every evening.  acetaminophen (TYLENOL) 500 mg tablet Take 500 mg by mouth every eight (8) hours as needed for Pain.  CARBOXYMETHYLCELLULOSE SODIUM (REFRESH OP) Apply 1 Drop to eye as needed (dry eye).  latanoprost (XALATAN) 0.005 % ophthalmic solution Administer 1 Drop to both eyes nightly.  mirtazapine (REMERON) 15 mg tablet 1-2 tabs at bedtime prn No current facility-administered medications for this visit. There are no discontinued medications. [facility] follow up appointment(s):  
Future Appointments Date Time Provider Alejandro Scott 9/17/2018 10:45 AM Boaz Troncoso MD [address] [address] [facility] follow up appointment(s): Daughter Matt Vigil reports she does have an appointment with out patient psychiatrist but she does not recall his name or appointment time. She will call NN back with information. [facility]:  ERIBERTO Kingsley  Attends follow-up appointments as directed. · 9/4/2018 Daughter understands need to see outpatient psychiatrist. Will call NN back with information.  PAC

## 2018-09-17 ENCOUNTER — OFFICE VISIT (OUTPATIENT)
Dept: INTERNAL MEDICINE CLINIC | Age: 83
End: 2018-09-17

## 2018-09-17 VITALS
BODY MASS INDEX: 33.44 KG/M2 | HEIGHT: 61 IN | SYSTOLIC BLOOD PRESSURE: 151 MMHG | RESPIRATION RATE: 18 BRPM | HEART RATE: 55 BPM | TEMPERATURE: 98.5 F | WEIGHT: 177.13 LBS | DIASTOLIC BLOOD PRESSURE: 89 MMHG | OXYGEN SATURATION: 94 %

## 2018-09-17 DIAGNOSIS — F51.04 PSYCHOPHYSIOLOGICAL INSOMNIA: ICD-10-CM

## 2018-09-17 DIAGNOSIS — R44.1 VISUAL HALLUCINATION: ICD-10-CM

## 2018-09-17 DIAGNOSIS — F22 PARANOIA (PSYCHOSIS) (HCC): Primary | ICD-10-CM

## 2018-09-17 DIAGNOSIS — I10 ESSENTIAL HYPERTENSION: ICD-10-CM

## 2018-09-17 RX ORDER — SPIRONOLACTONE 50 MG/1
50 TABLET, FILM COATED ORAL EVERY EVENING
Qty: 30 TAB | Refills: 1 | Status: SHIPPED | OUTPATIENT
Start: 2018-09-17 | End: 2019-03-15 | Stop reason: SDUPTHER

## 2018-09-17 NOTE — MR AVS SNAPSHOT
[facility] 
 
 
 [address] [place] [address] [facility] 
[phone] Patient: Alannah Landeros MRN:  [id number] Visit Information Date & Time Provider Department Dept. Phone Encounter #  
 9/17/2018 10:45 AM Enio Lewis MD [facility] [address] [id number] Upcoming Health Maintenance Date Due DTaP/Tdap/Td series (1 - Tdap) 7/20/1952 Influenza Age 5 to Adult 8/1/2018 Bone Densitometry (Dexa) Screening 6/9/2021* MEDICARE YEARLY EXAM 5/31/2019 GLAUCOMA SCREENING Q2Y 12/15/2019 *Topic was postponed. The date shown is not the original due date. Allergies as of 9/17/2018  Review Complete On: 9/17/2018 By: Stephan Santana LPN Severity Noted Reaction Type Reactions Meperidine Medium 03/11/2016    Hives Other reaction(s): Hives Ace Inhibitors  05/14/2010    Unknown (comments) Aldactone [Spironolactone]  01/30/2018    Other (comments)  
 hyperkalemia Codeine  05/14/2010    Unknown (comments) Miralax [Polyethylene Glycol 3350]  05/14/2010    Nausea and Vomiting Other Plant, Animal, Environmental  07/14/2017    Rash Bilateral Hearing Aids cause severe reaction per pt report Percocet [Oxycodone-acetaminophen]  05/14/2010    Unknown (comments) Statins-hmg-coa Reductase Inhibitors  05/14/2010    Unknown (comments) Sular [Nisoldipine]  05/14/2010    Rash Zetia [Ezetimibe]  05/14/2010    Unknown (comments) Current Immunizations  Reviewed on 10/23/2017 Name Date Influenza High Dose Vaccine PF 10/23/2017, 10/27/2016 Influenza Vaccine 9/25/2013 Influenza Vaccine Split 9/19/2012, 11/17/2011, 11/10/2010 Pneumococcal Conjugate (PCV-13) 10/27/2016 ZZZ-RETIRED (DO NOT USE) Pneumococcal Vaccine (Unspecified Type) 5/14/2001 Not reviewed this visit You Were Diagnosed With   
  
 Codes Comments Psychophysiological insomnia    -  Primary ICD-10-CM: F51.04 
ICD-9-CM: 307.42 Visual hallucination     ICD-10-CM: R44.1 ICD-9-CM: 368.16 [facility] ([facility]     ICD-10-CM: F22 
ICD-9-CM: 297.1 Essential hypertension     ICD-10-CM: I10 
ICD-9-CM: 401.9 Vitals BP Pulse Temp Resp Height(growth percentile) Weight(growth percentile) 151/89 (BP 1 Location: Left arm, BP Patient Position: Sitting) (!) 55 98.5 °F (36.9 °C) (Oral) 18 5' 1\" (1.549 m) 177 lb 2 oz (80.3 kg) SpO2 BMI OB Status Smoking Status 94% 33.47 kg/m2 Hysterectomy Never Smoker Vitals History BMI and BSA Data Body Mass Index Body Surface Area  
 33.47 kg/m 2 1.86 m 2 Preferred Pharmacy Pharmacy Name Phone [id number] [address] [place] [phone] Your Updated Medication List  
  
   
This list is accurate as of 9/17/18 11:17 AM.  Always use your most recent med list.  
  
  
  
  
 acetaminophen 500 mg tablet Commonly known as:  TYLENOL Take 500 mg by mouth every eight (8) hours as needed for Pain.  
  
 mirtazapine 15 mg tablet Commonly known as:  REMERON  
1-2 tabs at bedtime prn OLANZapine 2.5 mg tablet Commonly known as:  ZyPREXA Take 1 Tab by mouth nightly. REFRESH OP Apply 1 Drop to eye as needed (dry eye). spironolactone 50 mg tablet Commonly known as:  ALDACTONE Take 1 Tab by mouth every evening. XALATAN 0.005 % ophthalmic solution Generic drug:  latanoprost  
Administer 1 Drop to both eyes nightly. Prescriptions Sent to Pharmacy Refills  
 spironolactone (ALDACTONE) 50 mg tablet 1 Sig: Take 1 Tab by mouth every evening. Class: Normal  
 Pharmacy: [facility] [address], [address] [place] [address] Ph #: [phone] Route: Oral  
  
We Performed the Following REFERRAL TO PSYCHIATRY [REF91 Custom] Referral Information Referral ID Referred By Referred To  
  
 [id number] Mario Baer Not Available Visits Status Start Date End Date 1 New Request 9/17/18 9/17/19 If your referral has a status of pending review or denied, additional information will be sent to support the outcome of this decision. Introducing [facility]! [employer] introduces [employer] patient portal. Now you can access parts of your medical record, email your doctor's office, and request medication refills online. 1. In your internet browser, go to [email or web address] 2. Click on the First Time User? Click Here link in the Sign In box. You will see the New Member Sign Up page. 3. Enter your [employer]qua Access Code exactly as it appears below. You will not need to use this code after youve completed the sign-up process. If you do not sign up before the expiration date, you must request a new code. · Taqua Access Code: [email or web address] Expires: 10/11/2018 10:51 AM 
 
4. Enter the last four digits of your Social Security Number (xxxx) and Date of Birth (mm/dd/yyyy) as indicated and click Submit. You will be taken to the next sign-up page. 5. Create a Taqua ID. This will be your [employer]qua login ID and cannot be changed, so think of one that is secure and easy to remember. 6. Create a Taqua password. You can change your password at any time. 7. Enter your Password Reset Question and Answer. This can be used at a later time if you forget your password. 8. Enter your e-mail address. You will receive e-mail notification when new information is available in [address] 9. Click Sign Up. You can now view and download portions of your medical record. 10. Click the Download Summary menu link to download a portable copy of your medical information. If you have questions, please visit the Frequently Asked Questions section of the Taqua website.  Remember, [employer]qua is NOT to be used for urgent needs. For medical emergencies, dial 911. Now available from your iPhone and Android! Please provide this summary of care documentation to your next provider. Your primary care clinician is listed as Marquis Schaefer. If you have any questions after today's visit, please call [phone].

## 2018-09-17 NOTE — PROGRESS NOTES
HISTORY OF PRESENT ILLNESS Lora Villaseñor is a 80 y.o. female. HPI Problem follow up: 
Lora Villaseñor returns for follow up visit regarding severe paranoia and visual hallucinations. she was seen 3 weeks ago in [facility] diagnosed with same and treated with zyprexa. Workup was significant for same. Notes, labs, studies, imaging related to this problem during prior visit were available . Dr. Liliana Mena in psychiatry saw her. His note reviewed. Since that visit, she has improved. she has been compliant with prescribed treatment. Residual symptoms include: just occasional mild paranoia and baracading doors. Daughter with her reports much improvement New issues associated with this problem include: feels some drowsiness with zyprexa. Shania Crews EXAM:  CT HEAD WO CONT 
  
INDICATION:   paranoia 
  
COMPARISON: 8/14/2011 MRI. 
  
CONTRAST:  None. 
  
TECHNIQUE: Unenhanced CT of the head was performed using 5 mm images. Brain and 
bone windows were generated. CT dose reduction was achieved through use of a 
standardized protocol tailored for this examination and automatic exposure 
control for dose modulation.   
  
FINDINGS: 
The ventricles and sulci are normal in size, shape and configuration and 
midline. There are vague hypodensities in the left basal ganglia, with a 
corresponding change on the 2011 MRI. There is no intracranial hemorrhage, 
extra-axial collection, mass, mass effect or midline shift. The basilar 
cisterns are open. No acute infarct is identified. The bone windows demonstrate 
no abnormalities. The visualized portions of the paranasal sinuses and mastoid 
air cells are clear. 
  
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 No acute process identified by 
noncontrast CT Lab Results Component Value Date/Time  Sodium 140 08/28/2018 10:01 PM  
 Potassium 4.2 08/28/2018 10:01 PM  
 Chloride 108 08/28/2018 10:01 PM  
 CO2 24 08/28/2018 10:01 PM  
 Anion gap 8 08/28/2018 10:01 PM  
 Glucose 102 (H) 08/28/2018 10:01 PM  
 BUN 23 (H) 08/28/2018 10:01 PM  
 Creatinine 1.05 (H) 08/28/2018 10:01 PM  
 BUN/Creatinine ratio 22 (H) 08/28/2018 10:01 PM  
 GFR est AA >60 08/28/2018 10:01 PM  
 GFR est non-AA 50 (L) 08/28/2018 10:01 PM  
 Calcium 8.8 08/28/2018 10:01 PM  
 Bilirubin, total 0.9 08/28/2018 10:01 PM  
 AST (SGOT) 14 (L) 08/28/2018 10:01 PM  
 Alk. phosphatase 90 08/28/2018 10:01 PM  
 Protein, total 6.9 08/28/2018 10:01 PM  
 Albumin 3.7 08/28/2018 10:01 PM  
 Globulin 3.2 08/28/2018 10:01 PM  
 A-G Ratio 1.2 08/28/2018 10:01 PM  
 ALT (SGPT) 12 08/28/2018 10:01 PM  
 
Lab Results Component Value Date/Time WBC 9.6 08/28/2018 10:01 PM  
 WBC 7.2 06/19/2012 12:00 AM  
 Hemoglobin (POC) 15.6 05/26/2010 10:16 AM  
 HGB 15.1 08/28/2018 10:01 PM  
 Hematocrit (POC) 46 05/26/2010 10:16 AM  
 HCT 46.1 08/28/2018 10:01 PM  
 PLATELET 451 40/93/6024 10:01 PM  
 MCV 88.0 08/28/2018 10:01 PM  
 
UA - normal 
 
Hypertension: 
Dacia Beard is a 80 y.o. female with hypertension. with Chronic kidney disease stage 3 Medication change since last visit: Yes: Comment: pt stopped her spironolactone although she was to continue it after discharge. The patient reports:  taking medications as instructed, no medication side effects noted, patient does not perform home BP monitoring, no chest pain on exertion, no dyspnea on exertion, no swelling of ankles, no orthostatic dizziness or lightheadedness, no palpitations. Lifestyle modification/social history: sedentary, nonsmoker Lab Results Component Value Date/Time  Sodium 140 08/28/2018 10:01 PM  
 Potassium 4.2 08/28/2018 10:01 PM  
 Chloride 108 08/28/2018 10:01 PM  
 CO2 24 08/28/2018 10:01 PM  
 Anion gap 8 08/28/2018 10:01 PM  
 Glucose 102 (H) 08/28/2018 10:01 PM  
 BUN 23 (H) 08/28/2018 10:01 PM  
 Creatinine 1.05 (H) 08/28/2018 10:01 PM  
 BUN/Creatinine ratio 22 (H) 08/28/2018 10:01 PM  
 GFR est AA >60 08/28/2018 10:01 PM  
 GFR est non-AA 50 (L) 08/28/2018 10:01 PM  
 Calcium 8.8 08/28/2018 10:01 PM  
 
 
 
 
 
Patient Active Problem List  
Diagnosis Code  ABAD (obstructive sleep apnea) G47.33  
 GERD (gastroesophageal reflux disease) K21.9  Esophageal stricture K22.2  DJD (degenerative joint disease), lumbar M47.816  Macular degeneration H35.30  Essential hypertension I10  
 Depression F32.9  Psychophysiological insomnia F51.04  
 Glaucoma H40.9 Past Medical History:  
Diagnosis Date  Depression \"better since moving into my daughters mother-in-law suite\"  DJD (degenerative joint disease), lumbar  GERD (gastroesophageal reflux disease)   
 stricture.  Glaucoma   
 [place] (hard of hearing)  HTN (hypertension)  Keratoconjunctivitis   
 sicca  Macular degeneration   
 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 didn't help  Osteopenia  Venous insufficiency 10/10/2011 Allergies Allergen Reactions  Meperidine Hives Other reaction(s): Hives  Ace Inhibitors Unknown (comments)  Aldactone [Spironolactone] Other (comments)  
  hyperkalemia  Codeine Unknown (comments)  Miralax [Polyethylene Glycol 3350] Nausea and Vomiting  Other Plant, Animal, Environmental Rash Bilateral Hearing Aids cause severe reaction per pt report  Percocet [Oxycodone-Acetaminophen] Unknown (comments)  Statins-Hmg-Coa Reductase Inhibitors Unknown (comments)  Sular [Nisoldipine] Rash  Zetia [Ezetimibe] Unknown (comments) Current Outpatient Prescriptions on File Prior to Visit Medication Sig Dispense Refill  OLANZapine (ZYPREXA) 2.5 mg tablet Take 1 Tab by mouth nightly. 30 Tab 2  
 spironolactone (ALDACTONE) 50 mg tablet Take 50 mg by mouth every evening.  acetaminophen (TYLENOL) 500 mg tablet Take 500 mg by mouth every eight (8) hours as needed for Pain.  mirtazapine (REMERON) 15 mg tablet 1-2 tabs at bedtime prn 40 Tab 2  
 CARBOXYMETHYLCELLULOSE SODIUM (REFRESH OP) Apply 1 Drop to eye as needed (dry eye).  latanoprost (XALATAN) 0.005 % ophthalmic solution Administer 1 Drop to both eyes nightly. No current facility-administered medications on file prior to visit. Social History Substance Use Topics  Smoking status: Never Smoker  Smokeless tobacco: Never Used  Alcohol use No  
 
 
 
ROS Physical Exam  
Constitutional: She appears well-developed and well-nourished. No distress. /89 (BP 1 Location: Left arm, BP Patient Position: Sitting)  Pulse (!) 55  Temp 98.5 °F (36.9 °C) (Oral)   Resp 18  Ht 5' 1\" (1.549 m)  Wt 177 lb 2 oz (80.3 kg)  SpO2 94%  BMI 33.47 kg/m2Body mass index is 33.47 kg/(m^2). HENT:  
Mouth/Throat: Oropharynx is clear and moist.  
Neck: No JVD present. Carotid bruit is not present. Cardiovascular: Normal rate, regular rhythm, normal heart sounds and intact distal pulses. Pulmonary/Chest: Effort normal and breath sounds normal.  
Abdominal: Soft. Bowel sounds are normal. She exhibits no distension. There is no tenderness. Musculoskeletal: She exhibits edema (trace ankle edema bilaterally). Neurological: She is alert. Skin: Skin is warm and dry. She is not diaphoretic. Psychiatric: Her speech is normal and behavior is normal. Her mood appears not anxious. Her affect is blunt. Thought content is not paranoid and not delusional. Cognition and memory are normal. She does not exhibit a depressed mood. Nursing note and vitals reviewed. ASSESSMENT and PLAN Diagnoses and all orders for this visit: 1. Paranoia (psychosis) ([facility] - now on zyprexa with some improvement. She does need to follow up with psychiatry however she is reluctant for unclear reasons. She would also benefit from moving to assisted living facility. Her family plan to pursue that as well. -     REFERRAL TO PSYCHIATRY 2. Psychophysiological insomnia 
-     REFERRAL TO PSYCHIATRY 3. Visual hallucination 
-     REFERRAL TO PSYCHIATRY 4. Essential hypertension -not controlled. Resume aldactone. Recheck bp in 1 month. -     spironolactone (ALDACTONE) 50 mg tablet; Take 1 Tab by mouth every evening. Follow-up Disposition: Not on File

## 2018-09-26 ENCOUNTER — PATIENT OUTREACH (OUTPATIENT)
Dept: INTERNAL MEDICINE CLINIC | Age: 83
End: 2018-09-26

## 2018-09-26 NOTE — PROGRESS NOTES
9/26/2018 Daughter Melchor Painter (on [facility]) reports patient is doing well and they do have an appointment with a psychiatrist but she does not know the details since her sister made the appointment. Reports her mom continues without hallucinations.  PAC

## 2018-10-02 ENCOUNTER — PATIENT OUTREACH (OUTPATIENT)
Dept: INTERNAL MEDICINE CLINIC | Age: 83
End: 2018-10-02

## 2018-10-02 NOTE — PROGRESS NOTES
Patient has graduated from the Transitions of Care Coordination  program on 09/30/2018. Patient's symptoms are stable at this time. Patient/family has the ability to self-manage. Care management goals have been completed at this time. No further nurse navigator follow up scheduled. Pt has nurse navigator's contact information for any further questions, concerns, or needs. Patients upcoming visits:  Future Appointments Date Time Provider Alejandro Scott 10/16/2018 10:45 AM Aaron Sanderson MD [address] [address]

## 2018-10-02 NOTE — TELEPHONE ENCOUNTER
Ultrasound is normal. Need HIDA scan to make sure gallbladder empties well.

## 2018-10-02 NOTE — PROGRESS NOTES
[facility] [place] [address], [address] [place] Phone: [phone] Fax: [phone] Discharge Summary 2-15 Patient name: Gregory Mcconnell  : 1931  Provider#: [id number] Referral source: Blaine Brannon MD     
Medical/Treatment Diagnosis: Gait instability [R26.81] Prior Hospitalization: see medical history Comorbidities: See Plan of Care Prior Level of Function: See Plan of Care Medications: Verified on Patient Summary List 
 
Start of Care: 18      Onset Date:1 month prior Visits from Start of Care: 22     Missed Visits: 0 Reporting Period : 18-18 Assessment/Summary of care: Patient seen for balance deficits in OP PT. She made excellent progress during this time and was provided with HEP for continued management of Sx. 
 
  
Short Term Goals: To be accomplished in 6 weeks: Met To demonstrate compliance with safe transfer technique To demonstrated compliance with HEP To increase LE strength by 1/2 ms grade tolerate standing for 30 minutes to cook meals independently To improve Ziegler to medium fall risk so patient can get in/out shower and car Long Term Goals: To be accomplished in 12 weeks: Progressing To improve LE strength by 1 ms grade so patient can stand/walk for 1 hour to grocery shop To improve Ziegler score to low fall risk so patient can get to all Dr. Yamilka Angela independently To improve lumbar ROM to [facility] so patient can bend to the floor to dress RECOMMENDATIONS: 
[x]Discontinue therapy: [x]Patient has reached or is progressing toward set goals []Patient is non-compliant or has abdicated 
   []Due to lack of appreciable progress towards set goals []Other Gerri Cortez PT 10/2/2018 12:00 PM

## 2018-10-16 ENCOUNTER — OFFICE VISIT (OUTPATIENT)
Dept: INTERNAL MEDICINE CLINIC | Age: 83
End: 2018-10-16

## 2018-10-16 VITALS
HEART RATE: 76 BPM | DIASTOLIC BLOOD PRESSURE: 82 MMHG | BODY MASS INDEX: 33.07 KG/M2 | TEMPERATURE: 99 F | SYSTOLIC BLOOD PRESSURE: 142 MMHG | HEIGHT: 61 IN | WEIGHT: 175.13 LBS | OXYGEN SATURATION: 95 % | RESPIRATION RATE: 18 BRPM

## 2018-10-16 DIAGNOSIS — Z23 ENCOUNTER FOR IMMUNIZATION: ICD-10-CM

## 2018-10-16 DIAGNOSIS — I10 ESSENTIAL HYPERTENSION: ICD-10-CM

## 2018-10-16 DIAGNOSIS — F51.04 PSYCHOPHYSIOLOGICAL INSOMNIA: Primary | ICD-10-CM

## 2018-10-16 DIAGNOSIS — E66.09 CLASS 1 OBESITY DUE TO EXCESS CALORIES WITHOUT SERIOUS COMORBIDITY WITH BODY MASS INDEX (BMI) OF 33.0 TO 33.9 IN ADULT: ICD-10-CM

## 2018-10-16 RX ORDER — MIRTAZAPINE 15 MG/1
15 TABLET, FILM COATED ORAL
Qty: 30 TAB | Refills: 2 | Status: SHIPPED | OUTPATIENT
Start: 2018-10-16 | End: 2018-12-07 | Stop reason: SDUPTHER

## 2018-10-16 RX ORDER — OLANZAPINE 2.5 MG/1
2.5 TABLET ORAL
Qty: 30 TAB | Refills: 2 | Status: SHIPPED | OUTPATIENT
Start: 2018-10-16 | End: 2019-01-16 | Stop reason: SDUPTHER

## 2018-10-16 NOTE — PROGRESS NOTES
HISTORY OF PRESENT ILLNESS Beatrice Talavera is a 80 y.o. female. HPI Hypertension: 
Beatrice Talavera is a 80 y.o. female with hypertension. with Chronic kidney disease stage 3 Medication change since last visit: No 
The patient reports:  taking medications as instructed, no medication side effects noted, no chest pain on exertion, no dyspnea on exertion, no swelling of ankles, no orthostatic dizziness or lightheadedness, no palpitations. Lifestyle modification/social history: not attempting to follow a low fat, low cholesterol diet, sedentary, nonsmoker Lab Results Component Value Date/Time Sodium 140 08/28/2018 10:01 PM  
 Potassium 4.2 08/28/2018 10:01 PM  
 Chloride 108 08/28/2018 10:01 PM  
 CO2 24 08/28/2018 10:01 PM  
 Anion gap 8 08/28/2018 10:01 PM  
 Glucose 102 (H) 08/28/2018 10:01 PM  
 BUN 23 (H) 08/28/2018 10:01 PM  
 Creatinine 1.05 (H) 08/28/2018 10:01 PM  
 BUN/Creatinine ratio 22 (H) 08/28/2018 10:01 PM  
 GFR est AA >60 08/28/2018 10:01 PM  
 GFR est non-AA 50 (L) 08/28/2018 10:01 PM  
 Calcium 8.8 08/28/2018 10:01 PM  
 
 
Problem follow up: 
Beatrice Talavera returns for follow up visit regarding paranoia and hallucinations. she was seen 1 months ago in office in follow up  diagnosed with same and treated with continuing remeron and zyprexa. Workup was significant for same. Notes, labs, studies, imaging related to this problem during prior visit were available . Since that visit, she has improved. she has been compliant with prescribed treatment. Residual symptoms include: she denies ongoing paranoia, visual or auditory hallucinations. She reports good sleep and daytime function. Still living with family New issues associated with this problem include: none. Patient Active Problem List  
Diagnosis Code  ABAD (obstructive sleep apnea) G47.33  
 GERD (gastroesophageal reflux disease) K21.9  Esophageal stricture K22.2  DJD (degenerative joint disease), lumbar M47.816  Macular degeneration H35.30  Essential hypertension I10  
 Depression F32.9  Psychophysiological insomnia F51.04  
 Glaucoma H40.9 Past Medical History:  
Diagnosis Date  Depression \"better since moving into my daughters mother-in-law suite\"  DJD (degenerative joint disease), lumbar  GERD (gastroesophageal reflux disease)   
 stricture.  Glaucoma   
 [place] (hard of hearing)  HTN (hypertension)  Keratoconjunctivitis   
 sicca  Macular degeneration   
 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 didn't help  Osteopenia  Venous insufficiency 10/10/2011 Allergies Allergen Reactions  Meperidine Hives Other reaction(s): Hives  Ace Inhibitors Unknown (comments)  Aldactone [Spironolactone] Other (comments)  
  hyperkalemia  Codeine Unknown (comments)  Miralax [Polyethylene Glycol 3350] Nausea and Vomiting  Other Plant, Animal, Environmental Rash Bilateral Hearing Aids cause severe reaction per pt report  Percocet [Oxycodone-Acetaminophen] Unknown (comments)  Statins-Hmg-Coa Reductase Inhibitors Unknown (comments)  Sular [Nisoldipine] Rash  Zetia [Ezetimibe] Unknown (comments) Current Outpatient Prescriptions on File Prior to Visit Medication Sig Dispense Refill  spironolactone (ALDACTONE) 50 mg tablet Take 1 Tab by mouth every evening. 30 Tab 1  
 acetaminophen (TYLENOL) 500 mg tablet Take 500 mg by mouth every eight (8) hours as needed for Pain.  CARBOXYMETHYLCELLULOSE SODIUM (REFRESH OP) Apply 1 Drop to eye as needed (dry eye).  latanoprost (XALATAN) 0.005 % ophthalmic solution Administer 1 Drop to both eyes nightly. No current facility-administered medications on file prior to visit. Social History Substance Use Topics  Smoking status: Never Smoker  Smokeless tobacco: Never Used  Alcohol use No  
 
 
 
 
ROS Physical Exam  
Constitutional: She appears well-developed and well-nourished. No distress. /82 (BP 1 Location: Left arm, BP Patient Position: Sitting)  Pulse 76  Temp 99 °F (37.2 °C) (Oral)   Resp 18  Ht 5' 1\" (1.549 m)  Wt 175 lb 2 oz (79.4 kg)  SpO2 95%  BMI 33.09 kg/m2Body mass index is 33.09 kg/(m^2). HENT:  
Mouth/Throat: Oropharynx is clear and moist.  
Neck: No JVD present. Carotid bruit is not present. Cardiovascular: Normal rate, regular rhythm, normal heart sounds and intact distal pulses. Pulmonary/Chest: Effort normal and breath sounds normal.  
Musculoskeletal: She exhibits no edema. Neurological: She is alert. Skin: Skin is warm and dry. She is not diaphoretic. Psychiatric: Her speech is normal and behavior is normal. Judgment and thought content normal. Her affect is blunt. Cognition and memory are normal.  
Nursing note and vitals reviewed. ASSESSMENT and PLAN Diagnoses and all orders for this visit: 1. Psychophysiological insomnia - Well controlled and stable. her medications were reviewed and refilled where necessary as noted below. Labs ordered as noted. -     mirtazapine (REMERON) 15 mg tablet; Take 1 Tab by mouth nightly. -     OLANZapine (ZYPREXA) 2.5 mg tablet; Take 1 Tab by mouth nightly. 2. Encounter for immunization -     [place] IMMUNIZ ADMIN,1 SINGLE/COMB VAC/TOXOID 
-     Influenza Vaccine Inactivated (IIV) (FLUAD), Subunit, Adjuvanted, IM (13722) 3. Essential hypertension -fair control. No med changes. 4. Class 1 obesity due to excess calories without serious comorbidity with body mass index (BMI) of 33.0 to 33.9 in adult - her family is shopping for her and making better meal choices with her. Follow-up Disposition: 
Return in about 3 months (around 1/16/2019).

## 2018-10-16 NOTE — MR AVS SNAPSHOT
[facility] 
 
 
 [address] [place] [address] [facility] 
[phone] Patient: Aurelia Hester MRN:  [id number] Visit Information Date & Time Provider Department Dept. Phone Encounter #  
 10/16/2018 10:45 AM Tristian Miller MD [facility] [address]  [id number] Follow-up Instructions Return in about 3 months (around 1/16/2019). Upcoming Health Maintenance Date Due DTaP/Tdap/Td series (1 - Tdap) 7/20/1952 Shingrix Vaccine Age 50> (1 of 2) 7/20/1981 Influenza Age 5 to Adult 8/1/2018 Bone Densitometry (Dexa) Screening 6/9/2021* MEDICARE YEARLY EXAM 5/31/2019 GLAUCOMA SCREENING Q2Y 12/15/2019 *Topic was postponed. The date shown is not the original due date. Allergies as of 10/16/2018  Review Complete On: 9/17/2018 By: Beto Jennings LPN Severity Noted Reaction Type Reactions Meperidine Medium 03/11/2016    Hives Other reaction(s): Hives Ace Inhibitors  05/14/2010    Unknown (comments) Aldactone [Spironolactone]  01/30/2018    Other (comments)  
 hyperkalemia Codeine  05/14/2010    Unknown (comments) Miralax [Polyethylene Glycol 3350]  05/14/2010    Nausea and Vomiting Other Plant, Animal, Environmental  07/14/2017    Rash Bilateral Hearing Aids cause severe reaction per pt report Percocet [Oxycodone-acetaminophen]  05/14/2010    Unknown (comments) Statins-hmg-coa Reductase Inhibitors  05/14/2010    Unknown (comments) Sular [Nisoldipine]  05/14/2010    Rash Zetia [Ezetimibe]  05/14/2010    Unknown (comments) Current Immunizations  Reviewed on 10/23/2017 Name Date Influenza High Dose Vaccine PF 10/23/2017, 10/27/2016 Influenza Vaccine 9/25/2013 Influenza Vaccine (Tri) Adjuvanted 10/16/2018 Influenza Vaccine Split 9/19/2012, 11/17/2011, 11/10/2010 Pneumococcal Conjugate (PCV-13) 10/27/2016 ZZZ-RETIRED (DO NOT USE) Pneumococcal Vaccine (Unspecified Type) 5/14/2001 Not reviewed this visit You Were Diagnosed With   
  
 Codes Comments Psychophysiological insomnia    -  Primary ICD-10-CM: F51.04 
ICD-9-CM: 307.42 Encounter for immunization     ICD-10-CM: X01 ICD-9-CM: V03.89 Essential hypertension     ICD-10-CM: I10 
ICD-9-CM: 401.9 Class 1 obesity due to excess calories without serious comorbidity with body mass index (BMI) of 33.0 to 33.9 in adult     ICD-10-CM: E66.09, Z68.33 
ICD-9-CM: 278.00, V85.33 Vitals BP Pulse Temp Resp Height(growth percentile) Weight(growth percentile) 142/82 (BP 1 Location: Left arm, BP Patient Position: Sitting) 76 99 °F (37.2 °C) (Oral) 18 5' 1\" (1.549 m) 175 lb 2 oz (79.4 kg) SpO2 BMI OB Status Smoking Status 95% 33.09 kg/m2 Hysterectomy Never Smoker Vitals History BMI and BSA Data Body Mass Index Body Surface Area 33.09 kg/m 2 1.85 m 2 Preferred Pharmacy Pharmacy Name Phone [id number] [phone] Your Updated Medication List  
  
   
This list is accurate as of 10/16/18 11:13 AM.  Always use your most recent med list.  
  
  
  
  
 acetaminophen 500 mg tablet Commonly known as:  TYLENOL Take 500 mg by mouth every eight (8) hours as needed for Pain.  
  
 mirtazapine 15 mg tablet Commonly known as:  Shay Lipoma Take 1 Tab by mouth nightly. OLANZapine 2.5 mg tablet Commonly known as:  ZyPREXA Take 1 Tab by mouth nightly. REFRESH OP Apply 1 Drop to eye as needed (dry eye). spironolactone 50 mg tablet Commonly known as:  ALDACTONE Take 1 Tab by mouth every evening. XALATAN 0.005 % ophthalmic solution Generic drug:  latanoprost  
Administer 1 Drop to both eyes nightly. Prescriptions Sent to Pharmacy  Refills  
 mirtazapine (REMERON) 15 mg tablet 2  
 Sig: Take 1 Tab by mouth nightly. Class: Normal  
 Pharmacy: [facility]/pharmacy [address] , [address] [place] [address] Road Ph #: [phone] Route: Oral  
 OLANZapine (ZYPREXA) 2.5 mg tablet 2 Sig: Take 1 Tab by mouth nightly. Class: Normal  
 Pharmacy: [facility]/pharmacy [address] , [address] Ph #: [phone] Route: Oral  
  
We Performed the Following INFLUENZA VACCINE INACTIVATED (IIV), SUBUNIT, ADJUVANTED, IM [id number] CPT(R)] [place] IMMUNIZ ADMIN,1 SINGLE/COMB VAC/TOXOID [id number] CPT(R)] Follow-up Instructions Return in about 3 months (around 1/16/2019). Introducing [address]! [address] introduces [employer] patient portal. Now you can access parts of your medical record, email your doctor's office, and request medication refills online. 1. In your internet browser, go to [email or web address] 2. Click on the First Time User? Click Here link in the Sign In box. You will see the New Member Sign Up page. 3. Enter your [employer] Access Code exactly as it appears below. You will not need to use this code after youve completed the sign-up process. If you do not sign up before the expiration date, you must request a new code. · [employer] Access Code: [email or web address] Expires: 1/14/2019 11:13 AM 
 
4. Enter the last four digits of your Social Security Number (xxxx) and Date of Birth (mm/dd/yyyy) as indicated and click Submit. You will be taken to the next sign-up page. 5. Create a [employer] ID. This will be your [employer] login ID and cannot be changed, so think of one that is secure and easy to remember. 6. Create a [employer] password. You can change your password at any time. 7. Enter your Password Reset Question and Answer. This can be used at a later time if you forget your password. 8. Enter your e-mail address. You will receive e-mail notification when new information is available in [address] 9. Click Sign Up. You can now view and download portions of your medical record. 10. Click the Download Summary menu link to download a portable copy of your medical information. If you have questions, please visit the Frequently Asked Questions section of the [employer] website. Remember, [employer] is NOT to be used for urgent needs. For medical emergencies, dial 911. Now available from your iPhone and Android! Please provide this summary of care documentation to your next provider. Your primary care clinician is listed as Shital Ball. If you have any questions after today's visit, please call [phone].

## 2018-11-13 DIAGNOSIS — I10 ESSENTIAL HYPERTENSION: ICD-10-CM

## 2018-11-13 RX ORDER — SPIRONOLACTONE 50 MG/1
TABLET, FILM COATED ORAL
Qty: 45 TAB | Refills: 3 | Status: SHIPPED | OUTPATIENT
Start: 2018-11-13 | End: 2019-03-15 | Stop reason: SDUPTHER

## 2018-12-07 DIAGNOSIS — F51.04 PSYCHOPHYSIOLOGICAL INSOMNIA: ICD-10-CM

## 2018-12-07 RX ORDER — MIRTAZAPINE 15 MG/1
15 TABLET, FILM COATED ORAL
Qty: 90 TAB | Refills: 0 | Status: SHIPPED | OUTPATIENT
Start: 2018-12-07 | End: 2019-07-16

## 2019-01-16 DIAGNOSIS — F51.04 PSYCHOPHYSIOLOGICAL INSOMNIA: ICD-10-CM

## 2019-01-16 NOTE — TELEPHONE ENCOUNTER
Dr Wally Hernandez pt, made appt to see Dr Kyra Lazcano 3/29/19. She needs this med refilled (she is out at end of Jan). She is a former pt of Dr Cachorro Song so she would like to not have to change practices again. Daughter is pt of Dr Kyra Lazcano.

## 2019-01-17 RX ORDER — OLANZAPINE 2.5 MG/1
2.5 TABLET ORAL
Qty: 30 TAB | Refills: 2 | Status: SHIPPED | OUTPATIENT
Start: 2019-01-17 | End: 2019-04-16

## 2019-03-15 DIAGNOSIS — I10 ESSENTIAL HYPERTENSION: ICD-10-CM

## 2019-03-15 RX ORDER — SPIRONOLACTONE 50 MG/1
TABLET, FILM COATED ORAL
Qty: 45 TAB | Refills: 3 | Status: SHIPPED | OUTPATIENT
Start: 2019-03-15 | End: 2019-04-16 | Stop reason: SDUPTHER

## 2019-03-15 NOTE — TELEPHONE ENCOUNTER
Last visit noted, labs checked and refill deemed appropriate at this time. Verbal refill order authorized by covering physicians at [facility] for Dr. Tosin Richard during his absence.     Shasta Mcfarlane, PharmD, BCPS, CDE

## 2019-03-28 ENCOUNTER — TELEPHONE (OUTPATIENT)
Dept: INTERNAL MEDICINE CLINIC | Age: 84
End: 2019-03-28

## 2019-03-29 ENCOUNTER — TELEPHONE (OUTPATIENT)
Dept: INTERNAL MEDICINE CLINIC | Age: 84
End: 2019-03-29

## 2019-03-29 NOTE — TELEPHONE ENCOUNTER
Called patient to see what meds she needed refilled. She said she had enough meds for now but that she wanted to be seen because it had been 5 months. She lives in [facility] so I gave her the number to that practice and advised her to call them to set up an appointment. She verbalized understanding.     Burgess Juárez, PharmD, BCPS, CDE

## 2019-03-29 NOTE — TELEPHONE ENCOUNTER
Spoke to Luci twice and explained that Dr. Kayla Ledesma will take on her care for her. She has reschedule her appt to April 16th and is not in need of medicines at this time. Patient is very pleasant and grateful that dr. Kayla Ledesma will take her on as a patient.

## 2019-03-29 NOTE — TELEPHONE ENCOUNTER
----- Message from Wilfred Brown sent at 3/29/2019  8:15 AM EDT -----  Regarding: Dr. Warner Mills  Pt requesting a call back to reschedule a cancelled appt and states that she needs her medications refilled. Best contact [phone].

## 2019-04-11 ENCOUNTER — DOCUMENTATION ONLY (OUTPATIENT)
Dept: INTERNAL MEDICINE CLINIC | Age: 84
End: 2019-04-11

## 2019-04-16 ENCOUNTER — OFFICE VISIT (OUTPATIENT)
Dept: INTERNAL MEDICINE CLINIC | Age: 84
End: 2019-04-16

## 2019-04-16 ENCOUNTER — HOSPITAL ENCOUNTER (OUTPATIENT)
Dept: LAB | Age: 84
Discharge: HOME | End: 2019-04-16
Payer: MEDICARE

## 2019-04-16 VITALS
WEIGHT: 175 LBS | SYSTOLIC BLOOD PRESSURE: 150 MMHG | BODY MASS INDEX: 33.04 KG/M2 | TEMPERATURE: 98.4 F | HEIGHT: 61 IN | RESPIRATION RATE: 16 BRPM | OXYGEN SATURATION: 94 % | HEART RATE: 76 BPM | DIASTOLIC BLOOD PRESSURE: 85 MMHG

## 2019-04-16 DIAGNOSIS — H92.03 OTALGIA OF BOTH EARS: ICD-10-CM

## 2019-04-16 DIAGNOSIS — F51.04 PSYCHOPHYSIOLOGICAL INSOMNIA: ICD-10-CM

## 2019-04-16 DIAGNOSIS — Z76.89 ESTABLISHING CARE WITH NEW DOCTOR, ENCOUNTER FOR: Primary | ICD-10-CM

## 2019-04-16 DIAGNOSIS — I10 ESSENTIAL HYPERTENSION: ICD-10-CM

## 2019-04-16 DIAGNOSIS — F32.A DEPRESSION, UNSPECIFIED DEPRESSION TYPE: ICD-10-CM

## 2019-04-16 DIAGNOSIS — Z95.0 PRESENCE OF CARDIAC PACEMAKER: ICD-10-CM

## 2019-04-16 DIAGNOSIS — E55.9 VITAMIN D DEFICIENCY: ICD-10-CM

## 2019-04-16 DIAGNOSIS — E66.9 OBESITY, UNSPECIFIED CLASSIFICATION, UNSPECIFIED OBESITY TYPE, UNSPECIFIED WHETHER SERIOUS COMORBIDITY PRESENT: ICD-10-CM

## 2019-04-16 PROCEDURE — 84443 ASSAY THYROID STIM HORMONE: CPT

## 2019-04-16 PROCEDURE — 82306 VITAMIN D 25 HYDROXY: CPT

## 2019-04-16 PROCEDURE — 80053 COMPREHEN METABOLIC PANEL: CPT

## 2019-04-16 PROCEDURE — 85027 COMPLETE CBC AUTOMATED: CPT

## 2019-04-16 PROCEDURE — 36415 COLL VENOUS BLD VENIPUNCTURE: CPT

## 2019-04-16 RX ORDER — SPIRONOLACTONE 50 MG/1
50 TABLET, FILM COATED ORAL 2 TIMES DAILY
Qty: 180 TAB | Refills: 1 | Status: SHIPPED | OUTPATIENT
Start: 2019-04-16 | End: 2019-08-20

## 2019-04-16 RX ORDER — DEXAMETHASONE SODIUM PHOSPHATE 1 MG/ML
SOLUTION/ DROPS OPHTHALMIC
Qty: 5 ML | Refills: 1 | Status: SHIPPED | OUTPATIENT
Start: 2019-04-16 | End: 2020-01-28

## 2019-04-16 RX ORDER — OLANZAPINE 5 MG/1
5 TABLET ORAL
Qty: 90 TAB | Refills: 1 | Status: SHIPPED | OUTPATIENT
Start: 2019-04-16 | End: 2019-09-20 | Stop reason: SDUPTHER

## 2019-04-16 NOTE — PROGRESS NOTES
Karlie Lowe is a 80 y.o. female who presents today for Establish Care (patient requests paper script of refills to find new pharmacy) Milton Pickard She has a history of  
Patient Active Problem List  
Diagnosis Code  ABAD (obstructive sleep apnea) G47.33  
 GERD (gastroesophageal reflux disease) K21.9  Esophageal stricture K22.2  DJD (degenerative joint disease), lumbar M47.816  Macular degeneration H35.30  Essential hypertension I10  
 Depression F32.9  Psychophysiological insomnia F51.04  
 Glaucoma H40.9  Presence of cardiac pacemaker Z95.0  Otalgia of both ears H92.03 Milton Pickard Today patient is here to establish care. Previous PCP Dr. Mason La. Records are available for me to review. Hypertension -blood pressure elevated here in the office. On repeat it is improved. Home blood pressure readings are improved. Hypertension ROS: taking medications as instructed, no medication side effects noted, no TIA's, no chest pain on exertion, no dyspnea on exertion, no swelling of ankles     reports that she has never smoked. She has never used smokeless tobacco.    reports that she does not drink alcohol. BP Readings from Last 2 Encounters:  
04/16/19 150/85  
10/16/18 142/82 Depression/Paranoia/Hallucinations: Hospitalization last year due to paranoia. Patient is on  Zyprexa. Long history of insomnia and mild paranoia. She is off her Remeron. Notes that symptoms are greatly improved but her sleep is gotten a bit worse recently. We discussed that we could increase her Zyprexa dose. Patient is paying almost $100 a month for Zyprexa. We discussed the good Rx.com shows it at $9 at [facility]. Obesity: weight has been stable. Patient notes that she has an allergic reaction to her hearing aids and therefore does not wear them all the time. Occasionally puts triamcinolone cream in that area. We discussed using Decadron drops to see if this could not help. Has pacer due to what sounds like sick sinus syndrome. Follwing with Dr. Silas Yusuf. No issues since Social: Living in a home with a daughter. Does her finances. 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 Enjoys reading, puzzles. No Driving. Family History: reviewed ROS Review of Systems Constitutional: Negative for chills, fever and weight loss. HENT: Negative for congestion and sore throat. Eyes: Negative for blurred vision, double vision, photophobia and pain. Respiratory: Negative for cough and shortness of breath. Cardiovascular: Negative for chest pain, palpitations, orthopnea, claudication and leg swelling. Gastrointestinal: Negative for abdominal pain, constipation, diarrhea, heartburn, nausea and vomiting. Genitourinary: Negative for dysuria, frequency, hematuria and urgency. Musculoskeletal: Negative for back pain, joint pain, myalgias and neck pain. Skin: Negative for rash. Neurological: Negative. Negative for headaches. Endo/Heme/Allergies: Does not bruise/bleed easily. Psychiatric/Behavioral: Negative for depression, memory loss and suicidal ideas. Hallucinations: These have resolved. The patient has insomnia. The patient is not nervous/anxious. Visit Vitals /85 Pulse 76 Temp 98.4 °F (36.9 °C) (Oral) Resp 16 Ht 5' 1\" (1.549 m) Wt 175 lb (79.4 kg) SpO2 94% BMI 33.07 kg/m² Physical Exam  
Constitutional: She is oriented to person, place, and time and well-developed, well-nourished, and in no distress. No distress. HENT:  
Head: Normocephalic and atraumatic. Mouth/Throat: No oropharyngeal exudate. Eyes: Pupils are equal, round, and reactive to light. Conjunctivae are normal. No scleral icterus. Neck: Normal range of motion. No JVD present. No thyromegaly present. Cardiovascular: Normal rate and regular rhythm. Exam reveals no gallop and no friction rub. No murmur heard. Pacemaker to right chest wall Pulmonary/Chest: Effort normal and breath sounds normal. No respiratory distress. She has no wheezes. Abdominal: Soft. Bowel sounds are normal. She exhibits no distension. There is no rebound and no guarding. Musculoskeletal: Normal range of motion. She exhibits no edema. Neurological: She is alert and oriented to person, place, and time. No cranial nerve deficit. Skin: Skin is dry. No rash noted. Psychiatric: Mood, memory, affect and judgment normal.  
 
 
Current Outpatient Medications Medication Sig  OLANZapine (ZYPREXA) 5 mg tablet Take 1 Tab by mouth nightly.  spironolactone (ALDACTONE) 50 mg tablet Take 1 Tab by mouth two (2) times a day.  dexamethasone (DECADRON) 0.1 % ophthalmic solution Apply 1-2 drops into each ear PRN  
 acetaminophen (TYLENOL) 500 mg tablet Take 500 mg by mouth every eight (8) hours as needed for Pain.  CARBOXYMETHYLCELLULOSE SODIUM (REFRESH OP) Apply 1 Drop to eye as needed (dry eye).  latanoprost (XALATAN) 0.005 % ophthalmic solution Administer 1 Drop to both eyes nightly.  mirtazapine (REMERON) 15 mg tablet Take 1 Tab by mouth nightly. No current facility-administered medications for this visit. Past Medical History:  
Diagnosis Date  Depression \"better since moving into my daughters mother-in-law suite\"  DJD (degenerative joint disease), lumbar  GERD (gastroesophageal reflux disease)   
 stricture.  Glaucoma   
 [place] (hard of hearing)  HTN (hypertension)  Keratoconjunctivitis   
 sicca  Macular degeneration   
 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 didn't help  Osteopenia  Venous insufficiency 10/10/2011 Past Surgical History:  
Procedure Laterality Date  BREAST SURGERY PROCEDURE UNLISTED  30 yrs ago Breast Lumpectomy (side?)  EGD  2004  HX CHOLECYSTECTOMY  07/2018  HX COLONOSCOPY  2004  HX ENDOSCOPY  2004  HX HYSTERECTOMY  HX ORTHOPAEDIC  2005  
 cyst/tumor left hip  HX ORTHOPAEDIC  2014 Right Total Hip    Dr. Tobin Estimable J-W  
 HX PACEMAKER  11/4/2011 [employer] [facility] ROONEY-T  
 HX PACEMAKER    
 STRESS TEST LEXISCAN/CARDIOLITE  10/3/11  
 normal perfusion, EF 72% Social History Tobacco Use  Smoking status: Never Smoker  Smokeless tobacco: Never Used Substance Use Topics  Alcohol use: No  
  
Family History Problem Relation Age of Onset  Heart Failure Mother  Psychiatric Disorder Mother  Heart Disease Father  Stroke Brother  Psychiatric Disorder Other   
     aunt Allergies Allergen Reactions  Meperidine Hives Other reaction(s): Hives  Ace Inhibitors Unknown (comments)  Aldactone [Spironolactone] Other (comments)  
  hyperkalemia  Codeine Unknown (comments)  Miralax [Polyethylene Glycol 3350] Nausea and Vomiting  Other Plant, Animal, Environmental Rash Bilateral Hearing Aids cause severe reaction per pt report  Percocet [Oxycodone-Acetaminophen] Unknown (comments)  Statins-Hmg-Coa Reductase Inhibitors Unknown (comments)  Sular [Nisoldipine] Rash  Zetia [Ezetimibe] Unknown (comments) Assessment/Plan Diagnoses and all orders for this visit: 1. Establishing care with new doctor, encounter for -I reviewed her history with family and herself. 2. Essential hypertension -a bit above goal.  Home readings are usually in the 130s-140s. Will increase to 50 mg twice daily -     METABOLIC PANEL, COMPREHENSIVE 
-     CBC W/O DIFF 
-     spironolactone (ALDACTONE) 50 mg tablet; Take 1 Tab by mouth two (2) times a day. -     METABOLIC PANEL, COMPREHENSIVE 
-     CBC W/O DIFF 4. Depression, unspecified depression type -patient with hallucinations in the past.  Likely has some mild dementia. Paranoid thoughts at times. Zyprexa has been great for her. Recently sleep is been a bit worse. These seem to be worse if she does not get sleep. We will increase Zyprexa 5 mg at bedtime 
-     TSH 3RD GENERATION 
-     OLANZapine (ZYPREXA) 5 mg tablet; Take 1 Tab by mouth nightly. 5. Obesity, unspecified classification, unspecified obesity type, unspecified whether serious comorbidity present -weight stable. 6. Psychophysiological insomnia -     OLANZapine (ZYPREXA) 5 mg tablet; Take 1 Tab by mouth nightly. 7. Vitamin D deficiency -check vitamin D levels -     VITAMIN D, 25 HYDROXY 8. Presence of cardiac pacemaker -seems to be secondary to sick sinus syndrome 9. Otalgia of both ears -seems to be allergic to hearing aids. Not wearing often due to this. We will see if Decadron eyedrops does not help her ears 
-     dexamethasone (DECADRON) 0.1 % ophthalmic solution; Apply 1-2 drops into each ear PRN Overf 40 minutes spent reviewing diagnoses and treatment options as well as side effects of medicines. Follow-up and Dispositions · Return in about 3 months (around 7/16/2019). Neal Hernandez MD 
4/16/2019 This note was created with the help of speech recognition software (Dragon) and may contain some 'sound alike' errors.

## 2019-04-17 DIAGNOSIS — E55.9 VITAMIN D DEFICIENCY: Primary | ICD-10-CM

## 2019-04-17 LAB
25(OH)D3+25(OH)D2 SERPL-MCNC: 10.6 NG/ML (ref 30–100)
ALBUMIN SERPL-MCNC: 4.2 G/DL (ref 3.5–4.7)
ALBUMIN/GLOB SERPL: 1.8 {RATIO} (ref 1.2–2.2)
ALP SERPL-CCNC: 76 IU/L (ref 39–117)
ALT SERPL-CCNC: 5 IU/L (ref 0–32)
AST SERPL-CCNC: 12 IU/L (ref 0–40)
BILIRUB SERPL-MCNC: 0.6 MG/DL (ref 0–1.2)
BUN SERPL-MCNC: 20 MG/DL (ref 8–27)
BUN/CREAT SERPL: 22 (ref 12–28)
CALCIUM SERPL-MCNC: 9.5 MG/DL (ref 8.7–10.3)
CHLORIDE SERPL-SCNC: 107 MMOL/L (ref 96–106)
CO2 SERPL-SCNC: 22 MMOL/L (ref 20–29)
CREAT SERPL-MCNC: 0.93 MG/DL (ref 0.57–1)
ERYTHROCYTE [DISTWIDTH] IN BLOOD BY AUTOMATED COUNT: 14.9 % (ref 12.3–15.4)
GLOBULIN SER CALC-MCNC: 2.3 G/DL (ref 1.5–4.5)
GLUCOSE SERPL-MCNC: 93 MG/DL (ref 65–99)
HCT VFR BLD AUTO: 46.7 % (ref 34–46.6)
HGB BLD-MCNC: 15.3 G/DL (ref 11.1–15.9)
INTERPRETATION: NORMAL
MCH RBC QN AUTO: 28.3 PG (ref 26.6–33)
MCHC RBC AUTO-ENTMCNC: 32.8 G/DL (ref 31.5–35.7)
MCV RBC AUTO: 86 FL (ref 79–97)
PLATELET # BLD AUTO: 167 X10E3/UL (ref 150–379)
POTASSIUM SERPL-SCNC: 4.9 MMOL/L (ref 3.5–5.2)
PROT SERPL-MCNC: 6.5 G/DL (ref 6–8.5)
RBC # BLD AUTO: 5.41 X10E6/UL (ref 3.77–5.28)
SODIUM SERPL-SCNC: 143 MMOL/L (ref 134–144)
TSH SERPL DL<=0.005 MIU/L-ACNC: 2.53 UIU/ML (ref 0.45–4.5)
WBC # BLD AUTO: 7.5 X10E3/UL (ref 3.4–10.8)

## 2019-04-17 RX ORDER — ERGOCALCIFEROL 1.25 MG/1
50000 CAPSULE ORAL
Qty: 12 CAP | Refills: 3 | Status: SHIPPED | OUTPATIENT
Start: 2019-04-17 | End: 2020-04-12

## 2019-04-17 NOTE — PROGRESS NOTES
Letter sent to patient. Please inform patient and daughter that I want her on once weekly vitamin D. I have called this in.

## 2019-07-16 ENCOUNTER — HOSPITAL ENCOUNTER (OUTPATIENT)
Dept: LAB | Age: 84
Discharge: HOME | End: 2019-07-16
Payer: MEDICARE

## 2019-07-16 ENCOUNTER — OFFICE VISIT (OUTPATIENT)
Dept: INTERNAL MEDICINE CLINIC | Age: 84
End: 2019-07-16

## 2019-07-16 VITALS
SYSTOLIC BLOOD PRESSURE: 134 MMHG | HEART RATE: 64 BPM | HEIGHT: 61 IN | RESPIRATION RATE: 18 BRPM | WEIGHT: 176 LBS | TEMPERATURE: 98.1 F | BODY MASS INDEX: 33.23 KG/M2 | OXYGEN SATURATION: 96 % | DIASTOLIC BLOOD PRESSURE: 76 MMHG

## 2019-07-16 DIAGNOSIS — Z86.2 HISTORY OF ANEMIA: ICD-10-CM

## 2019-07-16 DIAGNOSIS — E55.9 VITAMIN D DEFICIENCY: ICD-10-CM

## 2019-07-16 DIAGNOSIS — F32.A DEPRESSION, UNSPECIFIED DEPRESSION TYPE: ICD-10-CM

## 2019-07-16 DIAGNOSIS — I10 ESSENTIAL HYPERTENSION: Primary | ICD-10-CM

## 2019-07-16 DIAGNOSIS — F51.04 PSYCHOPHYSIOLOGICAL INSOMNIA: ICD-10-CM

## 2019-07-16 PROBLEM — H40.60X0 DRUG-INDUCED GLAUCOMA: Status: ACTIVE | Noted: 2017-06-06

## 2019-07-16 PROBLEM — T50.905A DRUG-INDUCED GLAUCOMA: Status: ACTIVE | Noted: 2017-06-06

## 2019-07-16 PROCEDURE — 83550 IRON BINDING TEST: CPT

## 2019-07-16 PROCEDURE — 36415 COLL VENOUS BLD VENIPUNCTURE: CPT

## 2019-07-16 PROCEDURE — 80053 COMPREHEN METABOLIC PANEL: CPT

## 2019-07-16 PROCEDURE — 85025 COMPLETE CBC W/AUTO DIFF WBC: CPT

## 2019-07-16 PROCEDURE — 82306 VITAMIN D 25 HYDROXY: CPT

## 2019-07-16 RX ORDER — OLANZAPINE 5 MG/1
5 TABLET ORAL
Qty: 90 TAB | Refills: 1 | Status: CANCELLED | OUTPATIENT
Start: 2019-07-16

## 2019-07-16 NOTE — PROGRESS NOTES
Feliciano Jaquez is a 80 y.o. female who presents today for Hypertension; Depression; and Insomnia  . 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 Today patient is here for follow-up. Hypertension -increase Aldactone to 50 mg twice daily at last visit. She noted a bit more cramping in the evening and now she is been taking 50 mg in the morning and 20 5 at night. Blood pressure stable. Hypertension ROS: taking medications as instructed, no medication side effects noted, no TIA's, no chest pain on exertion, no dyspnea on exertion, no swelling of ankles     reports that she has never smoked. She has never used smokeless tobacco.    reports that she does not drink alcohol. BP Readings from Last 2 Encounters:   07/16/19 134/76   04/16/19 150/85     Depression/Paranoia/Hallucinations follow-up -patient was having a bit more insomnia last visit we decided to increase her Zyprexa dose to 5 mg at bedtime. Patient overall feels her depression is gradually improving. Current symptoms include Insomnia is better. Treatment includes Zyprexa. Patient does not see a counselor. Denies current suicidal and homicidal plan or intent. Side effects from the treatment: none. Low vitamin D: Patient was very low vitamin D which we have started her on replacement. ROS  Review of Systems   Constitutional: Negative for chills, fever and weight loss. HENT: Negative for congestion and sore throat.     Eyes: Negative for blurred vision, double vision and photophobia. Respiratory: Negative for cough and shortness of breath. Cardiovascular: Negative for chest pain, palpitations and leg swelling. Gastrointestinal: Negative for abdominal pain, constipation, diarrhea, heartburn, nausea and vomiting. Genitourinary: Negative for dysuria, frequency, hematuria and urgency. Musculoskeletal: Negative for joint pain and myalgias. Skin: Negative for rash. Neurological: Negative. Negative for headaches. Endo/Heme/Allergies: Does not bruise/bleed easily. Psychiatric/Behavioral: Negative for depression, memory loss, substance abuse and suicidal ideas. The patient has insomnia. Visit Vitals  /76 (BP 1 Location: Left arm, BP Patient Position: Sitting)   Pulse 64   Temp 98.1 °F (36.7 °C) (Oral)   Resp 18   Ht 5' 1\" (1.549 m)   Wt 176 lb (79.8 kg)   SpO2 96%   BMI 33.25 kg/m²       Physical Exam   Constitutional: She is oriented to person, place, and time. She appears well-developed and well-nourished. HENT:   Head: Normocephalic and atraumatic. Eyes: Pupils are equal, round, and reactive to light. EOM are normal.   Cardiovascular: Normal rate and regular rhythm. No murmur heard. Pulmonary/Chest: Effort normal. No respiratory distress. Abdominal: Soft. Bowel sounds are normal. She exhibits no distension and no mass. There is no tenderness. There is no guarding. Neurological: She is alert and oriented to person, place, and time. Skin: Skin is warm and dry. Psychiatric: She has a normal mood and affect. Her behavior is normal.         Current Outpatient Medications   Medication Sig    ergocalciferol (ERGOCALCIFEROL) 50,000 unit capsule Take 1 Cap by mouth every seven (7) days.  OLANZapine (ZYPREXA) 5 mg tablet Take 1 Tab by mouth nightly.  spironolactone (ALDACTONE) 50 mg tablet Take 1 Tab by mouth two (2) times a day.     dexamethasone (DECADRON) 0.1 % ophthalmic solution Apply 1-2 drops into each ear PRN    acetaminophen (TYLENOL) 500 mg tablet Take 500 mg by mouth every eight (8) hours as needed for Pain.  CARBOXYMETHYLCELLULOSE SODIUM (REFRESH OP) Apply 1 Drop to eye as needed (dry eye).  latanoprost (XALATAN) 0.005 % ophthalmic solution Administer 1 Drop to both eyes nightly. No current facility-administered medications for this visit. Past Medical History:   Diagnosis Date    Depression     \"better since moving into my daughters mother-in-law suite\"    DJD (degenerative joint disease), lumbar     GERD (gastroesophageal reflux disease)     stricture.  Glaucoma     [place] (hard of hearing)     HTN (hypertension)     Keratoconjunctivitis     sicca    Macular degeneration     macular degeneration vs ooptic neuropathy    ABAD (obstructive sleep apnea)     decided not to use d/t frequent bathroom trips at night.  didn't help    Osteopenia     Venous insufficiency 10/10/2011      Past Surgical History:   Procedure Laterality Date    BREAST SURGERY PROCEDURE UNLISTED  30 yrs ago    Breast Lumpectomy (side?)    EGD  2004    HX CHOLECYSTECTOMY  07/2018    HX COLONOSCOPY  2004    HX ENDOSCOPY  2004    HX HYSTERECTOMY      HX ORTHOPAEDIC  2005    cyst/tumor left hip    HX ORTHOPAEDIC  2014    Right Total Hip    Dr. Cee Foster J-W    HX PACEMAKER  11/4/2011    [facility] Rochelle ZAMORA    HX PACEMAKER      STRESS TEST LEXISCAN/CARDIOLITE  10/3/11    normal perfusion, EF 72%      Social History     Tobacco Use    Smoking status: Never Smoker    Smokeless tobacco: Never Used   Substance Use Topics    Alcohol use: No      Family History   Problem Relation Age of Onset    Heart Failure Mother     Psychiatric Disorder Mother     Heart Disease Father     Stroke Brother     Psychiatric Disorder Other         aunt        Allergies   Allergen Reactions    Meperidine Hives     Other reaction(s): Hives    Ace Inhibitors Unknown (comments)    Aldactone [Spironolactone] Other (comments)     hyperkalemia    Codeine Unknown (comments)    Miralax [Polyethylene Glycol 3350] Nausea and Vomiting    Other Plant, Animal, Environmental Rash     Bilateral Hearing Aids cause severe reaction per pt report    Percocet [Oxycodone-Acetaminophen] Unknown (comments)    Statins-Hmg-Coa Reductase Inhibitors Unknown (comments)    Sular [Nisoldipine] Rash    Zetia [Ezetimibe] Unknown (comments)        Assessment/Plan  Diagnoses and all orders for this visit:    1. Essential hypertension -taking 50 mg in the morning and 20 5 at night due to cramping. Her blood pressure looks good here in her home blood pressure readings have been stable. Continue current therapy  -     METABOLIC PANEL, COMPREHENSIVE    2. Psychophysiological insomnia -patient doing well with higher dose olanzapine. No side effects.  -     METABOLIC PANEL, COMPREHENSIVE    3. Depression, unspecified depression type -mood stable    4. Vitamin D deficiency -repeat vitamin D.  Daughter notes that she seems to be sharper since she is been on this. -     METABOLIC PANEL, COMPREHENSIVE  -     VITAMIN D, 25 HYDROXY    5. History of anemia -deviously requiring IV iron. We will repeat her iron level. -     CBC WITH AUTOMATED DIFF  -     IRON PROFILE        Follow-up and Dispositions    · Return in about 3 months (around 10/16/2019) for Medicare wellness. Saadia Olguin MD  7/16/2019    This note was created with the help of speech recognition software Ana Torres) and may contain some 'sound alike' errors.

## 2019-07-17 PROBLEM — D58.2 ELEVATED HEMOGLOBIN (HCC): Status: ACTIVE | Noted: 2019-07-17

## 2019-07-17 LAB
25(OH)D3+25(OH)D2 SERPL-MCNC: 38.8 NG/ML (ref 30–100)
ALBUMIN SERPL-MCNC: 4.1 G/DL (ref 3.5–4.7)
ALBUMIN/GLOB SERPL: 2 {RATIO} (ref 1.2–2.2)
ALP SERPL-CCNC: 74 IU/L (ref 39–117)
ALT SERPL-CCNC: 6 IU/L (ref 0–32)
AST SERPL-CCNC: 9 IU/L (ref 0–40)
BASOPHILS # BLD AUTO: 0 X10E3/UL (ref 0–0.2)
BASOPHILS NFR BLD AUTO: 0 %
BILIRUB SERPL-MCNC: 0.6 MG/DL (ref 0–1.2)
BUN SERPL-MCNC: 17 MG/DL (ref 8–27)
BUN/CREAT SERPL: 17 (ref 12–28)
CALCIUM SERPL-MCNC: 9.5 MG/DL (ref 8.7–10.3)
CHLORIDE SERPL-SCNC: 105 MMOL/L (ref 96–106)
CO2 SERPL-SCNC: 22 MMOL/L (ref 20–29)
CREAT SERPL-MCNC: 1.02 MG/DL (ref 0.57–1)
EOSINOPHIL # BLD AUTO: 0.2 X10E3/UL (ref 0–0.4)
EOSINOPHIL NFR BLD AUTO: 3 %
ERYTHROCYTE [DISTWIDTH] IN BLOOD BY AUTOMATED COUNT: 14.3 % (ref 12.3–15.4)
GLOBULIN SER CALC-MCNC: 2.1 G/DL (ref 1.5–4.5)
GLUCOSE SERPL-MCNC: 88 MG/DL (ref 65–99)
HCT VFR BLD AUTO: 47.8 % (ref 34–46.6)
HGB BLD-MCNC: 16.2 G/DL (ref 11.1–15.9)
IMM GRANULOCYTES # BLD AUTO: 0 X10E3/UL (ref 0–0.1)
IMM GRANULOCYTES NFR BLD AUTO: 0 %
INTERPRETATION: NORMAL
IRON SATN MFR SERPL: 36 % (ref 15–55)
IRON SERPL-MCNC: 125 UG/DL (ref 27–139)
LYMPHOCYTES # BLD AUTO: 1.3 X10E3/UL (ref 0.7–3.1)
LYMPHOCYTES NFR BLD AUTO: 18 %
MCH RBC QN AUTO: 30.1 PG (ref 26.6–33)
MCHC RBC AUTO-ENTMCNC: 33.9 G/DL (ref 31.5–35.7)
MCV RBC AUTO: 89 FL (ref 79–97)
MONOCYTES # BLD AUTO: 0.9 X10E3/UL (ref 0.1–0.9)
MONOCYTES NFR BLD AUTO: 12 %
NEUTROPHILS # BLD AUTO: 4.6 X10E3/UL (ref 1.4–7)
NEUTROPHILS NFR BLD AUTO: 67 %
PLATELET # BLD AUTO: 183 X10E3/UL (ref 150–450)
POTASSIUM SERPL-SCNC: 4.8 MMOL/L (ref 3.5–5.2)
PROT SERPL-MCNC: 6.2 G/DL (ref 6–8.5)
RBC # BLD AUTO: 5.38 X10E6/UL (ref 3.77–5.28)
SODIUM SERPL-SCNC: 141 MMOL/L (ref 134–144)
TIBC SERPL-MCNC: 348 UG/DL (ref 250–450)
UIBC SERPL-MCNC: 223 UG/DL (ref 118–369)
WBC # BLD AUTO: 7 X10E3/UL (ref 3.4–10.8)

## 2019-08-20 ENCOUNTER — APPOINTMENT (OUTPATIENT)
Dept: VASCULAR SURGERY | Age: 84
DRG: 184 | End: 2019-08-20
Attending: INTERNAL MEDICINE
Payer: MEDICARE

## 2019-08-20 ENCOUNTER — APPOINTMENT (OUTPATIENT)
Dept: NON INVASIVE DIAGNOSTICS | Age: 84
DRG: 184 | End: 2019-08-20
Attending: INTERNAL MEDICINE
Payer: MEDICARE

## 2019-08-20 ENCOUNTER — APPOINTMENT (OUTPATIENT)
Dept: CT IMAGING | Age: 84
DRG: 184 | End: 2019-08-20
Attending: STUDENT IN AN ORGANIZED HEALTH CARE EDUCATION/TRAINING PROGRAM
Payer: MEDICARE

## 2019-08-20 ENCOUNTER — APPOINTMENT (OUTPATIENT)
Dept: GENERAL RADIOLOGY | Age: 84
DRG: 184 | End: 2019-08-20
Attending: STUDENT IN AN ORGANIZED HEALTH CARE EDUCATION/TRAINING PROGRAM
Payer: MEDICARE

## 2019-08-20 ENCOUNTER — HOSPITAL ENCOUNTER (INPATIENT)
Age: 84
LOS: 3 days | Discharge: NURSING, REHAB OR LONG-TERM CARE | DRG: 184 | End: 2019-08-23
Attending: STUDENT IN AN ORGANIZED HEALTH CARE EDUCATION/TRAINING PROGRAM | Admitting: INTERNAL MEDICINE
Payer: MEDICARE

## 2019-08-20 DIAGNOSIS — S22.41XA CLOSED FRACTURE OF MULTIPLE RIBS OF RIGHT SIDE, INITIAL ENCOUNTER: Primary | ICD-10-CM
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
## 2019-08-20 PROBLEM — S22.49XA RIB FRACTURES: Status: ACTIVE | Noted: 2019-08-20

## 2019-08-20 PROBLEM — R82.81 PYURIA: Status: ACTIVE | Noted: 2019-08-20

## 2019-08-20 PROBLEM — R60.0 LOWER EXTREMITY EDEMA: Status: ACTIVE | Noted: 2019-08-20

## 2019-08-20 LAB
ALBUMIN SERPL-MCNC: 3.6 G/DL (ref 3.5–5)
ALBUMIN/GLOB SERPL: 1.1 {RATIO} (ref 1.1–2.2)
ALP SERPL-CCNC: 77 U/L (ref 45–117)
ALT SERPL-CCNC: 10 U/L (ref 12–78)
ANION GAP SERPL CALC-SCNC: 7 MMOL/L (ref 5–15)
APPEARANCE UR: CLEAR
AST SERPL-CCNC: 17 U/L (ref 15–37)
ATRIAL RATE: 576 BPM
BACTERIA URNS QL MICRO: NEGATIVE /HPF
BASOPHILS # BLD: 0 K/UL (ref 0–0.1)
BASOPHILS NFR BLD: 1 % (ref 0–1)
BILIRUB SERPL-MCNC: 0.7 MG/DL (ref 0.2–1)
BILIRUB UR QL CFM: NEGATIVE
BNP SERPL-MCNC: 173 PG/ML
BUN SERPL-MCNC: 21 MG/DL (ref 6–20)
BUN/CREAT SERPL: 21 (ref 12–20)
CALCIUM SERPL-MCNC: 9.1 MG/DL (ref 8.5–10.1)
CALCULATED P AXIS, ECG09: -111 DEGREES
CALCULATED R AXIS, ECG10: -42 DEGREES
CALCULATED T AXIS, ECG11: 5 DEGREES
CHLORIDE SERPL-SCNC: 111 MMOL/L (ref 97–108)
CO2 SERPL-SCNC: 24 MMOL/L (ref 21–32)
COLOR UR: ABNORMAL
CREAT SERPL-MCNC: 0.98 MG/DL (ref 0.55–1.02)
DIAGNOSIS, 93000: NORMAL
DIFFERENTIAL METHOD BLD: ABNORMAL
ECHO AO ROOT DIAM: 2.84 CM
ECHO AV PEAK GRADIENT: 5 MMHG
ECHO AV PEAK VELOCITY: 112.35 CM/S
ECHO AV REGURGITANT PHT: 877.2 CM
ECHO LA MAJOR AXIS: 3.28 CM
ECHO LA TO AORTIC ROOT RATIO: 1.15
ECHO LV E' LATERAL VELOCITY: 5.66 CENTIMETER/SECOND
ECHO LV E' SEPTAL VELOCITY: 5.5 CENTIMETER/SECOND
ECHO LV INTERNAL DIMENSION DIASTOLIC: 3.5 CM (ref 3.9–5.3)
ECHO LV INTERNAL DIMENSION SYSTOLIC: 2.32 CM
ECHO LV IVSD: 0.86 CM (ref 0.6–0.9)
ECHO LV MASS 2D: 106.2 G (ref 67–162)
ECHO LV MASS INDEX 2D: 61.7 G/M2 (ref 43–95)
ECHO LV POSTERIOR WALL DIASTOLIC: 1.05 CM (ref 0.6–0.9)
ECHO LVOT DIAM: 1.77 CM
ECHO MV A VELOCITY: 77.31 CM/S
ECHO MV AREA PHT: 5.7 CM2
ECHO MV E DECELERATION TIME (DT): 132.6 MS
ECHO MV E VELOCITY: 66.04 CM/S
ECHO MV E/A RATIO: 0.85
ECHO MV PRESSURE HALF TIME (PHT): 38.5 MS
ECHO PV MAX VELOCITY: 73.2 CM/S
ECHO PV PEAK GRADIENT: 2.1 MMHG
ECHO RV TAPSE: 1.23 CM (ref 1.5–2)
ECHO TV REGURGITANT MAX VELOCITY: 127.39 CM/S
ECHO TV REGURGITANT PEAK GRADIENT: 6.5 MMHG
EOSINOPHIL # BLD: 0.1 K/UL (ref 0–0.4)
EOSINOPHIL NFR BLD: 2 % (ref 0–7)
EPITH CASTS URNS QL MICRO: ABNORMAL /LPF
ERYTHROCYTE [DISTWIDTH] IN BLOOD BY AUTOMATED COUNT: 13.6 % (ref 11.5–14.5)
GLOBULIN SER CALC-MCNC: 3.4 G/DL (ref 2–4)
GLUCOSE SERPL-MCNC: 116 MG/DL (ref 65–100)
GLUCOSE UR STRIP.AUTO-MCNC: NEGATIVE MG/DL
HCT VFR BLD AUTO: 48.4 % (ref 35–47)
HGB BLD-MCNC: 16.1 G/DL (ref 11.5–16)
HGB UR QL STRIP: NEGATIVE
HYALINE CASTS URNS QL MICRO: ABNORMAL /LPF (ref 0–5)
IMM GRANULOCYTES # BLD AUTO: 0 K/UL (ref 0–0.04)
IMM GRANULOCYTES NFR BLD AUTO: 1 % (ref 0–0.5)
KETONES UR QL STRIP.AUTO: 15 MG/DL
LEUKOCYTE ESTERASE UR QL STRIP.AUTO: ABNORMAL
LYMPHOCYTES # BLD: 1.1 K/UL (ref 0.8–3.5)
LYMPHOCYTES NFR BLD: 16 % (ref 12–49)
MCH RBC QN AUTO: 29.7 PG (ref 26–34)
MCHC RBC AUTO-ENTMCNC: 33.3 G/DL (ref 30–36.5)
MCV RBC AUTO: 89.3 FL (ref 80–99)
MONOCYTES # BLD: 0.8 K/UL (ref 0–1)
MONOCYTES NFR BLD: 12 % (ref 5–13)
NEUTS SEG # BLD: 4.7 K/UL (ref 1.8–8)
NEUTS SEG NFR BLD: 68 % (ref 32–75)
NITRITE UR QL STRIP.AUTO: NEGATIVE
NRBC # BLD: 0 K/UL (ref 0–0.01)
NRBC BLD-RTO: 0 PER 100 WBC
P-R INTERVAL, ECG05: 184 MS
PH UR STRIP: 6 [PH] (ref 5–8)
PISA AR MAX VEL: 347.17 CM/S
PLATELET # BLD AUTO: 173 K/UL (ref 150–400)
PMV BLD AUTO: 11.2 FL (ref 8.9–12.9)
POTASSIUM SERPL-SCNC: 4.4 MMOL/L (ref 3.5–5.1)
PROT SERPL-MCNC: 7 G/DL (ref 6.4–8.2)
PROT UR STRIP-MCNC: NEGATIVE MG/DL
Q-T INTERVAL, ECG07: 408 MS
QRS DURATION, ECG06: 80 MS
QTC CALCULATION (BEZET), ECG08: 440 MS
RBC # BLD AUTO: 5.42 M/UL (ref 3.8–5.2)
RBC #/AREA URNS HPF: ABNORMAL /HPF (ref 0–5)
SODIUM SERPL-SCNC: 142 MMOL/L (ref 136–145)
SP GR UR REFRACTOMETRY: 1.03 (ref 1–1.03)
TROPONIN I SERPL-MCNC: <0.05 NG/ML
UA: UC IF INDICATED,UAUC: ABNORMAL
UROBILINOGEN UR QL STRIP.AUTO: 1 EU/DL (ref 0.2–1)
VENTRICULAR RATE, ECG03: 70 BPM
WBC # BLD AUTO: 6.9 K/UL (ref 3.6–11)
WBC URNS QL MICRO: ABNORMAL /HPF (ref 0–4)

## 2019-08-20 PROCEDURE — 99285 EMERGENCY DEPT VISIT HI MDM: CPT

## 2019-08-20 PROCEDURE — 74011250636 HC RX REV CODE- 250/636: Performed by: STUDENT IN AN ORGANIZED HEALTH CARE EDUCATION/TRAINING PROGRAM

## 2019-08-20 PROCEDURE — 84484 ASSAY OF TROPONIN QUANT: CPT

## 2019-08-20 PROCEDURE — 74176 CT ABD & PELVIS W/O CONTRAST: CPT

## 2019-08-20 PROCEDURE — 93005 ELECTROCARDIOGRAM TRACING: CPT

## 2019-08-20 PROCEDURE — 72125 CT NECK SPINE W/O DYE: CPT

## 2019-08-20 PROCEDURE — 74011250637 HC RX REV CODE- 250/637: Performed by: INTERNAL MEDICINE

## 2019-08-20 PROCEDURE — 36415 COLL VENOUS BLD VENIPUNCTURE: CPT

## 2019-08-20 PROCEDURE — 74011250637 HC RX REV CODE- 250/637: Performed by: STUDENT IN AN ORGANIZED HEALTH CARE EDUCATION/TRAINING PROGRAM

## 2019-08-20 PROCEDURE — 70450 CT HEAD/BRAIN W/O DYE: CPT

## 2019-08-20 PROCEDURE — 77030038269 HC DRN EXT URIN PURWCK BARD -A

## 2019-08-20 PROCEDURE — 74011000250 HC RX REV CODE- 250: Performed by: INTERNAL MEDICINE

## 2019-08-20 PROCEDURE — 81001 URINALYSIS AUTO W/SCOPE: CPT

## 2019-08-20 PROCEDURE — 80053 COMPREHEN METABOLIC PANEL: CPT

## 2019-08-20 PROCEDURE — 74011250636 HC RX REV CODE- 250/636: Performed by: INTERNAL MEDICINE

## 2019-08-20 PROCEDURE — 65660000000 HC RM CCU STEPDOWN

## 2019-08-20 PROCEDURE — C8929 TTE W OR WO FOL WCON,DOPPLER: HCPCS

## 2019-08-20 PROCEDURE — 87086 URINE CULTURE/COLONY COUNT: CPT

## 2019-08-20 PROCEDURE — 73562 X-RAY EXAM OF KNEE 3: CPT

## 2019-08-20 PROCEDURE — 85025 COMPLETE CBC W/AUTO DIFF WBC: CPT

## 2019-08-20 PROCEDURE — 83880 ASSAY OF NATRIURETIC PEPTIDE: CPT

## 2019-08-20 PROCEDURE — 93970 EXTREMITY STUDY: CPT

## 2019-08-20 PROCEDURE — 71250 CT THORAX DX C-: CPT

## 2019-08-20 PROCEDURE — 77030040361 HC SLV COMPR DVT MDII -B

## 2019-08-20 RX ORDER — CARBOXYMETHYLCELLULOSE SODIUM 10 MG/ML
2 GEL OPHTHALMIC AS NEEDED
Status: DISCONTINUED | OUTPATIENT
Start: 2019-08-20 | End: 2019-08-23 | Stop reason: HOSPADM

## 2019-08-20 RX ORDER — SPIRONOLACTONE 25 MG/1
50 TABLET ORAL DAILY
Status: DISCONTINUED | OUTPATIENT
Start: 2019-08-21 | End: 2019-08-23 | Stop reason: HOSPADM

## 2019-08-20 RX ORDER — NALOXONE HYDROCHLORIDE 0.4 MG/ML
0.4 INJECTION, SOLUTION INTRAMUSCULAR; INTRAVENOUS; SUBCUTANEOUS AS NEEDED
Status: DISCONTINUED | OUTPATIENT
Start: 2019-08-20 | End: 2019-08-23 | Stop reason: HOSPADM

## 2019-08-20 RX ORDER — SODIUM CHLORIDE 0.9 % (FLUSH) 0.9 %
5-40 SYRINGE (ML) INJECTION EVERY 8 HOURS
Status: DISCONTINUED | OUTPATIENT
Start: 2019-08-20 | End: 2019-08-23 | Stop reason: HOSPADM

## 2019-08-20 RX ORDER — SPIRONOLACTONE 50 MG/1
25 TABLET, FILM COATED ORAL
COMMUNITY
End: 2019-08-23

## 2019-08-20 RX ORDER — SPIRONOLACTONE 25 MG/1
25 TABLET ORAL
Status: DISCONTINUED | OUTPATIENT
Start: 2019-08-20 | End: 2019-08-23 | Stop reason: HOSPADM

## 2019-08-20 RX ORDER — ENOXAPARIN SODIUM 100 MG/ML
40 INJECTION SUBCUTANEOUS EVERY 24 HOURS
Status: DISCONTINUED | OUTPATIENT
Start: 2019-08-20 | End: 2019-08-23 | Stop reason: HOSPADM

## 2019-08-20 RX ORDER — ACETAMINOPHEN 500 MG
1000 TABLET ORAL
Status: COMPLETED | OUTPATIENT
Start: 2019-08-20 | End: 2019-08-20

## 2019-08-20 RX ORDER — LATANOPROST 50 UG/ML
1 SOLUTION/ DROPS OPHTHALMIC
Status: DISCONTINUED | OUTPATIENT
Start: 2019-08-20 | End: 2019-08-23 | Stop reason: HOSPADM

## 2019-08-20 RX ORDER — CEFDINIR 300 MG/1
300 CAPSULE ORAL EVERY 12 HOURS
Status: DISCONTINUED | OUTPATIENT
Start: 2019-08-20 | End: 2019-08-22

## 2019-08-20 RX ORDER — SODIUM CHLORIDE 9 MG/ML
500 INJECTION, SOLUTION INTRAVENOUS ONCE
Status: COMPLETED | OUTPATIENT
Start: 2019-08-20 | End: 2019-08-21

## 2019-08-20 RX ORDER — SODIUM CHLORIDE 0.9 % (FLUSH) 0.9 %
5-40 SYRINGE (ML) INJECTION AS NEEDED
Status: DISCONTINUED | OUTPATIENT
Start: 2019-08-20 | End: 2019-08-23 | Stop reason: HOSPADM

## 2019-08-20 RX ORDER — SPIRONOLACTONE 50 MG/1
50 TABLET, FILM COATED ORAL DAILY
COMMUNITY
End: 2019-09-20

## 2019-08-20 RX ORDER — OLANZAPINE 5 MG/1
5 TABLET ORAL
Status: DISCONTINUED | OUTPATIENT
Start: 2019-08-20 | End: 2019-08-23 | Stop reason: HOSPADM

## 2019-08-20 RX ORDER — TRAMADOL HYDROCHLORIDE 50 MG/1
50 TABLET ORAL
Status: DISCONTINUED | OUTPATIENT
Start: 2019-08-20 | End: 2019-08-23 | Stop reason: HOSPADM

## 2019-08-20 RX ORDER — ACETAMINOPHEN 325 MG/1
650 TABLET ORAL
Status: DISCONTINUED | OUTPATIENT
Start: 2019-08-20 | End: 2019-08-23 | Stop reason: HOSPADM

## 2019-08-20 RX ADMIN — Medication 10 ML: at 22:25

## 2019-08-20 RX ADMIN — TRAMADOL HYDROCHLORIDE 50 MG: 50 TABLET, FILM COATED ORAL at 17:51

## 2019-08-20 RX ADMIN — CEFDINIR 300 MG: 300 CAPSULE ORAL at 17:51

## 2019-08-20 RX ADMIN — PERFLUTREN 2 ML: 6.52 INJECTION, SUSPENSION INTRAVENOUS at 16:05

## 2019-08-20 RX ADMIN — LATANOPROST 1 DROP: 50 SOLUTION/ DROPS OPHTHALMIC at 22:25

## 2019-08-20 RX ADMIN — ACETAMINOPHEN 1000 MG: 500 TABLET ORAL at 10:19

## 2019-08-20 RX ADMIN — OLANZAPINE 5 MG: 5 TABLET, FILM COATED ORAL at 22:21

## 2019-08-20 RX ADMIN — ENOXAPARIN SODIUM 40 MG: 100 INJECTION SUBCUTANEOUS at 22:26

## 2019-08-20 RX ADMIN — SODIUM CHLORIDE 500 ML: 900 INJECTION, SOLUTION INTRAVENOUS at 12:10

## 2019-08-20 RX ADMIN — SPIRONOLACTONE 25 MG: 25 TABLET ORAL at 22:21

## 2019-08-20 NOTE — PROGRESS NOTES
8/20/2019  3:13 PM  Case management note    Reason for Admission:   Rib fracture  Patient is mostly independent and lives at daughters home in a in-law-suite. She does not use any DME for ambulation, though she has access. She fell yesterday and did not call daughter till this morning due to increased pain. Patient has fractured ribs. Patient has history of GERD, DM, macular, [place], pacemaker and depression. Chart indicates patient has history of frequent falls. [facility] [place]               RRAT Score:     25             Resources/supports as identified by patient/family:   Good family support                Top Challenges facing patient (as identified by patient/family and CM): Finances/Medication cost?      Medicare/ [employer], [facility]               Transportation?  family              Support system or lack thereof? Lives with Gramajo Brood daughter, in-law-suite. Currently Grazyna Carrasco is on vacation for the week. Meche Whitt is the other daughter, [phone]. Khushi is concerned about patient on discharge. Patient has been to rehab in past, but they cant remember name. If possible they would like for mother to go again, to get stronger and to be safe to return home. The daughters have been talking about assisted living for long term planning. She also stated that mom is a private person, therefore may not be as forth coming with information. Please call Khushi this week for concerns and discharge planning                       Living arrangements? Lives in in law suite at daughters home           Self-care/ADLs/Cognition? Can do self care, poor health cognition          Current Advanced Directive/Advance Care Plan:  DNR, not on file                          Plan for utilizing home health:    Home health vs rehab to be determined by PT/OT                 Transition of Care Plan:          1. PCP follow up  2.  Long term planning  3. Rehab vs home health  4.CM to follow until discharge            Care Management Interventions  PCP Verified by CM: Yes(dr. Pushpa meyers nn notified)  Mode of Transport at Discharge: Self  Transition of Care Consult (CM Consult): Discharge Planning  Current Support Network: [employer] Follow Up Transport: Family  Plan discussed with Pt/Family/Caregiver: Yes  Discharge Location  Discharge Placement: Home with family assistance  [place], [place]

## 2019-08-20 NOTE — ED NOTES
Patient Throughput:  Charge nurse on 5th floor made aware of patient's room assignment, room 534    Current MEWS score of 1          Irma De La Vega RN  Shift Resource Nurse  Emergency Department

## 2019-08-20 NOTE — PROGRESS NOTES
Bedside and Verbal shift change report given to Vladimir Steen (oncoming nurse) by Tommy Akins (offgoing nurse). Report included the following information SBAR, Kardex, Intake/Output, MAR, Recent Results and Cardiac Rhythm Paced.

## 2019-08-20 NOTE — PROGRESS NOTES
[facility]: MED RECONCILIATION    Comments/Recommendations:   Interview with the patient and family member at the bedside. Declines to review allergies with the pharmacist. Patient reports she reviewed her allergies with the nurse. Verifies preferred pharmacy listed. Denies recent changes to home medications. Medications added:     None    Medications removed:    None    Medications adjusted:    Spironolactone changed to 50 mg in the morning and 25 mg at bedtime    Allergies: Meperidine; Ace inhibitors; Aldactone [spironolactone]; Codeine; Miralax [polyethylene glycol 3350]; Other plant, animal, environmental; Percocet [oxycodone-acetaminophen]; Statins-hmg-coa reductase inhibitors; Sular [nisoldipine]; and Zetia [ezetimibe]    Prior to Admission Medications:   Prior to Admission Medications   Prescriptions Last Dose Informant Patient Reported? Taking? CARBOXYMETHYLCELLULOSE SODIUM (REFRESH OP)  Child Yes Yes   Sig: Apply 1 Drop to eye as needed (dry eye). OLANZapine (ZYPREXA) 5 mg tablet 8/19/2019 at Unknown time Child No Yes   Sig: Take 1 Tab by mouth nightly. acetaminophen (TYLENOL) 500 mg tablet 8/19/2019 at pm Child Yes Yes   Sig: Take 500 mg by mouth every eight (8) hours as needed for Pain. dexamethasone (DECADRON) 0.1 % ophthalmic solution  Child No Yes   Sig: Apply 1-2 drops into each ear PRN   ergocalciferol (ERGOCALCIFEROL) 50,000 unit capsule 8/18/2019 Child No Yes   Sig: Take 1 Cap by mouth every seven (7) days. latanoprost (XALATAN) 0.005 % ophthalmic solution 8/19/2019 at Unknown time Child Yes Yes   Sig: Administer 1 Drop to both eyes nightly. spironolactone (ALDACTONE) 50 mg tablet 8/19/2019 at Unknown time Child Yes Yes   Sig: Take 50 mg by mouth daily. spironolactone (ALDACTONE) 50 mg tablet 8/19/2019 at Unknown time Child Yes Yes   Sig: Take 25 mg by mouth nightly.       Facility-Administered Medications: None      Thank you,      Thaddeus Oshea, PharmD, BCPS

## 2019-08-20 NOTE — H&P
[address]  [address] [facility], [facility] [address]  [phone]    Hospitalist Admission Note      NAME:  Jorge Araujo   :   1931   MRN:  [id number]     PCP:  Sue Garcia MD     Date/Time:  2019 1:38 PM         Assessment / Plan:          Rib fractures: Acute right lateral seventh and eighth rib fractures. No pneumothorax or contusion. Traumatic, as a result of GLF. Patient and family uncertain of circumstances of fall, as pt was home alone, but working [employer] is that pt tripped over rug. Pt did endorse some dizziness and dyspnea however does not remember falling. Out of prudence, will check echo and consult cardiology. Monitor on telemetry. Pain control, opioids PRN if tolerated (daughter states not true allergy, but altered mentation with prior opioid use). Watch for sedation if opioids are given. Pt will be at risk for delirium regardless, so, minimize opioid use, and would avoid benzos and anticholinergics. Verbally re-direct whenever possible. Incentive spirometer, pulmonary toilet. PT/OT, fall precautions. If pain is not controlled on opioids, then consult anesthesia for nerve block, although I doubt this will be necessary. Fall: frequent falls over the past few moths. Of note, pt has vision problems as listed below, is hard of hearing, so certainly she remains at high fall risk. PT/OT, fall precautions. CM consult for suspected need for SNF placement, which I briefly discussed with pt's daughter      Type 2 diabetes with nephropathy: BG appears controlled, and NOT on home meds. Dose not appear to have DM. Check FBG and A1c      Lower extremity edema: check LE dopplers. Aldactone is listed as home med however also listed as \"allergy\", for hyperkalemia. Daughter confirms that pt is taking this; follow BMP while on Aldactone      Essential hypertension: BP initially high, likely from pain. Pain control.  Does not appear to be on home antihypertensives      ABAD (obstructive sleep apnea): CPAP QHS if tolerated      Esophageal stricture/ GERD (gastroesophageal reflux disease) (): can use PPI PRN      Macular degeneration / Glaucoma / Ptosis of eyelid: continue eye drops. Outpatient follow up. Supportive care. Fall precautions. OT eval and treat      Depression (): continue home psychotropics      Presence of cardiac pacemaker: monitor on remote tele for now      Code Status: DNR, d/w pt and daughter     Surrogate decision maker: daughter      ED notes and lab results reviewed. Total time spent with patient: 79 Minutes   Time spent in the care of this patient included reviewing records, discussing with nursing, obtaining history and examining the patient, and discussing treatment plans, with >50% time spent counseling/coordinating care    Risk of deterioration: High                 Care Plan discussed with: ED provider, Patient, Family, Nursing Staff and >50% of time spent in counseling and coordination of care    Discussed:  Code Status, Care Plan and D/C Planning    Prophylaxis:  Lovenox    Disposition:  Home w/Family                 Subjective:     CHIEF COMPLAINT: fell, R-sided pain     HISTORY OF PRESENT ILLNESS:     Ms. Mallika Cortes is a 80 y.o. female w/ hx of macular degeneration, HTN, ABAD who presents with R-sided rib pain. Started yesterday after a fall, tripping over a rug. Reports landing on her right, having hit her head on the foot stool of the recliner. NOT on anticoagulants. No HA but R-sided chest/rib pain, moderate, intermittent, worse with palpation and movement and deep breathing, also with R knee, associated with LE edema. No fever or chills. No coughing or dyspnea. ED workup showed acute right lateral seventh and eighth rib fractures on CT. No other fractures or acute processes on CT imaging studies. Ms. Mallika Cortes is admitted for further evaluation and management.       Past Medical History:   Diagnosis Date    Depression \"better since moving into my daughters mother-in-law suite\"    DJD (degenerative joint disease), lumbar     GERD (gastroesophageal reflux disease)     stricture.  Glaucoma     [place] (hard of hearing)     HTN (hypertension)     Keratoconjunctivitis     sicca    Macular degeneration     macular degeneration vs ooptic neuropathy    ABAD (obstructive sleep apnea)     decided not to use d/t frequent bathroom trips at night.  didn't help    Osteopenia     Venous insufficiency 10/10/2011        Past Surgical History:   Procedure Laterality Date    BREAST SURGERY PROCEDURE UNLISTED  30 yrs ago    Breast Lumpectomy (side?)    EGD  2004    HX CHOLECYSTECTOMY  07/2018    HX COLONOSCOPY  2004    HX ENDOSCOPY  2004    HX HYSTERECTOMY      HX ORTHOPAEDIC  2005    cyst/tumor left hip    HX ORTHOPAEDIC  2014    Right Total Hip    Dr. Panda Archuleta J-W    HX PACEMAKER  11/4/2011    [facility] Evia SERA    HX PACEMAKER      STRESS TEST LEXISCAN/CARDIOLITE  10/3/11    normal perfusion, EF 72%       Social History     Tobacco Use    Smoking status: Never Smoker    Smokeless tobacco: Never Used   Substance Use Topics    Alcohol use: No        Family History   Problem Relation Age of Onset    Heart Failure Mother     Psychiatric Disorder Mother     Heart Disease Father     Stroke Brother     Psychiatric Disorder Other         aunt        Allergies   Allergen Reactions    Meperidine Hives     Other reaction(s): Hives    Ace Inhibitors Unknown (comments)    Aldactone [Spironolactone] Other (comments)     hyperkalemia    Codeine Unknown (comments)    Miralax [Polyethylene Glycol 3350] Nausea and Vomiting    Other Plant, Animal, Environmental Rash     Bilateral Hearing Aids cause severe reaction per pt report    Percocet [Oxycodone-Acetaminophen] Unknown (comments)    Statins-Hmg-Coa Reductase Inhibitors Unknown (comments)    Sular [Nisoldipine] Rash    Zetia [Ezetimibe] Unknown (comments)        Prior to Admission medications    Medication Sig Start Date End Date Taking? Authorizing Provider   ergocalciferol (ERGOCALCIFEROL) 50,000 unit capsule Take 1 Cap by mouth every seven (7) days. 4/17/19   Jomar Lee MD   OLANZapine (ZYPREXA) 5 mg tablet Take 1 Tab by mouth nightly. 4/16/19   Jomar Lee MD   spironolactone (ALDACTONE) 50 mg tablet Take 1 Tab by mouth two (2) times a day. 4/16/19   Jomar Lee MD   dexamethasone (DECADRON) 0.1 % ophthalmic solution Apply 1-2 drops into each ear PRN 4/16/19   Jomar Lee MD   acetaminophen (TYLENOL) 500 mg tablet Take 500 mg by mouth every eight (8) hours as needed for Pain. Provider, Historical   CARBOXYMETHYLCELLULOSE SODIUM (REFRESH OP) Apply 1 Drop to eye as needed (dry eye). Provider, Historical   latanoprost (XALATAN) 0.005 % ophthalmic solution Administer 1 Drop to both eyes nightly. Provider, Historical       Review of Systems:  (bold if positive, if negative)    Gen:  Eyes:  ENT:  CVS:  Pulm:  GI:    :    MS:  Pain, weakness, swelling, arthritisSkin:  Psych:  Endo:    Hem:  Renal:    Neuro:            Objective:      VITALS:    Vital signs reviewed; most recent are:    Visit Vitals  /66   Pulse 94   Temp 98.7 °F (37.1 °C)   Resp 18   Ht 4' 11\" (1.499 m)   Wt 77.1 kg (170 lb)   SpO2 94%   BMI 34.34 kg/m²     SpO2 Readings from Last 6 Encounters:   08/20/19 94%   07/16/19 96%   04/16/19 94%   10/16/18 95%   09/17/18 94%   08/30/18 98%        No intake or output data in the 24 hours ending 08/20/19 1338         Exam:     Physical Exam:    Gen:  Well-developed, well-nourished, in no acute distress  HEENT:  No scleral icterus, PERRL, hard of hearing, moist mucous membranes  Neck:  Supple, without masses. Thyroid non-tender  Resp:  No accessory muscle use. CTAB without wheezing, rales, rhonchi  Card: RRR. Normal S1 and S2 without murmurs, rubs, or gallops. Minimal peripheral lower extremity edema. No JVD. Peripheral pulses in tact. Abd:  Normoactive bowel sounds. Soft, non-tender, non-distended. No rebound, no guarding. No appreciable hepatosplenomegaly   Lymph:  No cervical adenopathy  Musc:  No cyanosis or clubbing. R ribs TTP  Skin:  No rashes or ulcers; turgor intact. Neuro:  Cranial nerves are grossly intact, no focal motor weakness, follows commands appropriately  Psych:  Fair insight, normal affect. Alert, oriented to self and hospital. Answers questions appropriately       Labs:    Recent Labs     08/20/19  1002   WBC 6.9   HGB 16.1*   HCT 48.4*        Recent Labs     08/20/19  1002      K 4.4   *   CO2 24   *   BUN 21*   CREA 0.98   CA 9.1   ALB 3.6   SGOT 17   ALT 10*     No components found for: GLPOC  No results for input(s): PH, PCO2, PO2, HCO3, FIO2 in the last 72 hours. No results for input(s): INR in the last 72 hours. No lab exists for component: INREXT  Lab Results   Component Value Date/Time    Specimen Description: NARES 05/08/2012 11:00 PM    Specimen Description: BLOOD 05/08/2012 05:40 PM    Specimen Description: URINE 05/26/2010 03:15 PM     Lab Results   Component Value Date/Time    Culture result: NO GROWTH 1 DAY 08/29/2018 12:53 AM    Culture result: NO GROWTH 6 DAYS 08/28/2018 10:02 PM    Culture result:  05/08/2012 11:00 PM     MRSA NOT PRESENT      Screening of patient nares for MRSA is for surveillance purposes and, if positive, to facilitate isolation considerations in high risk settings. It is not intended for automatic decolonization interventions per se as regimens are not sufficiently effective to warrant routine use. All other current labs reviewed in the computer. Imaging/Studies:    Head CT:  1. No evidence of intracranial hemorrhage. 2. Presence of periventricular low density compatible with white matter disease. 3. Presence of tiny, old lacunar infarcts bilaterally as described above. CT C-spine  No acute fracture or subluxation    CT C/A/P:  1.  Acute right lateral seventh and eighth rib fractures. No pneumothorax or  contusion. 2. No acute traumatic injury in the abdomen or pelvis.     R knee XR:  No acute process    EKG: paced  EKG personally reviewed    ___________________________________________________    Attending Physician: Roc Kan MD

## 2019-08-20 NOTE — ED TRIAGE NOTES
\"She fell yesterday afternoon and she's been in a lot of pain on her right side and her feet and ankles are swelling. They didn't swell until after she fell, she said. \" Per daughter, patient caught her toe on a scatter rug. Patient states she did hit her head on the foot stool of the recliner. Pt does not take anticoagulants.

## 2019-08-20 NOTE — ED NOTES
TRANSFER - OUT REPORT:    Verbal report given to Anirudh Aguirre RN(name) on Boyce Ora  being transferred to 5th floor (unit) for routine progression of care       Report consisted of patients Situation, Background, Assessment and   Recommendations(SBAR). Information from the following report(s) SBAR, ED Summary, Procedure Summary, MAR, Recent Results and Cardiac Rhythm sinus bradycardia. was reviewed with the receiving nurse. Lines:   Peripheral IV 08/20/19 Right Antecubital (Active)   Site Assessment Clean, dry, & intact 8/20/2019 10:01 AM   Phlebitis Assessment 0 8/20/2019 10:01 AM   Infiltration Assessment 0 8/20/2019 10:01 AM   Dressing Status Clean, dry, & intact 8/20/2019 10:01 AM   Dressing Type Tape;Transparent 8/20/2019 10:01 AM   Hub Color/Line Status Pink;Flushed;Patent 8/20/2019 10:01 AM   Action Taken Blood drawn 8/20/2019 10:01 AM        Opportunity for questions and clarification was provided.       Patient transported with:   Monitor  Registered Nurse

## 2019-08-20 NOTE — ED PROVIDER NOTES
80 y.o. female with past medical history significant for HTN, osteopenia, DJD, Keratoconjunctivitis, venous insufficiency, glaucoma, macular degeneration, GERD, depression who presents via private vehicle to the ED with cc of pain in ribs. Patient reports that she was walking in her house yesterday when she tripped and fell down. She reports onset of pain after fall in her right ribs and right knee. Pain makes breathing harder. It is sharp and severe especially with movement. Patient denies LOC but notes that she has a headache and neck pain. Per family, Patient has swelling in the legs B/L at baseline, but that swelling has worsened after fall. Patient specifically denies anticoagulant treatment. She endorses use of fluid pills for blood pressure. She denies dizziness, shortness of breath, hitting her head, chest pain, abdominal pain or hip pain. Patient is hard of hearing at baseline and lives with her sister, who is out of town. There are no other acute medical concerns at this time. Social Hx: no Tobacco use, no EtOH use, no Illicit Drug use  PCP: lEla Reyna MD    Note written by Norma Schultz, as dictated by Kb Richardson, DO 9:45 AM      The history is provided by the patient and a friend. No  was used. Past Medical History:   Diagnosis Date    Depression     \"better since moving into my daughters mother-in-law suite\"    DJD (degenerative joint disease), lumbar     GERD (gastroesophageal reflux disease)     stricture.  Glaucoma     [place] (hard of hearing)     HTN (hypertension)     Keratoconjunctivitis     sicca    Macular degeneration     macular degeneration vs ooptic neuropathy    ABAD (obstructive sleep apnea)     decided not to use d/t frequent bathroom trips at night.  didn't help    Osteopenia     Venous insufficiency 10/10/2011       Past Surgical History:   Procedure Laterality Date    BREAST SURGERY PROCEDURE UNLISTED  30 yrs ago Breast Lumpectomy (side?)    EGD  2004    HX CHOLECYSTECTOMY  07/2018    HX COLONOSCOPY  2004    HX ENDOSCOPY  2004    HX HYSTERECTOMY      HX ORTHOPAEDIC  2005    cyst/tumor left hip    HX ORTHOPAEDIC  2014    Right Total Hip    Dr. Tena Jeans [facility]-W    HX PACEMAKER  11/4/2011    [facility] Rochelle ZAMORA    HX PACEMAKER      STRESS TEST LEXISCAN/CARDIOLITE  10/3/11    normal perfusion, EF 72%         Family History:   Problem Relation Age of Onset    Heart Failure Mother     Psychiatric Disorder Mother     Heart Disease Father     Stroke Brother     Psychiatric Disorder Other         aunt           ALLERGIES: Meperidine; Ace inhibitors; Aldactone [spironolactone]; Codeine; Miralax [polyethylene glycol 3350]; Other plant, animal, environmental; Percocet [oxycodone-acetaminophen]; Statins-hmg-coa reductase inhibitors; Sular [nisoldipine]; and Zetia [ezetimibe]    Review of Systems   Constitutional: Negative for chills and fever. HENT: Negative for congestion and rhinorrhea. Eyes: Negative for redness and visual disturbance. Respiratory: Negative for cough and shortness of breath. Cardiovascular: Positive for chest pain and leg swelling. Gastrointestinal: Negative for abdominal pain, diarrhea, nausea and vomiting. Genitourinary: Negative for dysuria, flank pain, frequency, hematuria and urgency. Musculoskeletal: Positive for arthralgias and neck pain. Negative for back pain. Skin: Negative for rash and wound. Allergic/Immunologic: Negative for immunocompromised state. Neurological: Positive for headaches. Negative for dizziness. Vitals:    08/20/19 1300 08/20/19 1315 08/20/19 1345 08/20/19 1400   BP: 140/66 132/76 143/58 127/45   Pulse:    (!) 56   Resp:    16   Temp:    97.4 °F (36.3 °C)   SpO2: 94% 95% 94% 94%   Weight:       Height:                Physical Exam   Constitutional: She is oriented to person, place, and time. She appears well-developed and well-nourished. No distress. HENT:   Head: Normocephalic and atraumatic. Mouth/Throat: Oropharynx is clear and moist. No oropharyngeal exudate. Hard of hearing   Eyes: Pupils are equal, round, and reactive to light. EOM are normal. Right eye exhibits no discharge. Left eye exhibits no discharge. Neck: Normal range of motion. Neck supple. Cardiovascular: Normal rate, regular rhythm, normal heart sounds and intact distal pulses. Exam reveals no gallop and no friction rub. No murmur heard. Pulmonary/Chest: Effort normal and breath sounds normal. No stridor. No respiratory distress. She has no wheezes. She has no rales. Tenderness to right upper chest wall   Abdominal: Soft. Bowel sounds are normal. She exhibits no distension. There is no rebound and no guarding. Abrasion to right upper quadrant, tenderness in RUQ   Musculoskeletal: Normal range of motion. She exhibits no edema or deformity. Pitting edema in B/L LE, abrasion to anterior right knee, left para spinal c-spine tenderness, tender in anterolateral rib on R w/o crepitus   Neurological: She is alert and oriented to person, place, and time. Skin: Skin is warm and dry. Capillary refill takes less than 2 seconds. No rash noted. She is not diaphoretic. Psychiatric: She has a normal mood and affect. Her behavior is normal.   Nursing note and vitals reviewed.     Note written by Norma Garcia, as dictated by Karo Wyatt DO 9:45 AM      ED EKG interpretation:  Rhythm: Atrial Pace rhythm; Rate (approx.): 70 bpm; Axis: left axis deviation; ST/T wave: non-specific changes  Note written by Norma Garcia, as dictated by Karo Wyatt DO 10:11 AM    Labs Reviewed:   CBC: no significant leukocytosis or anemia   CMP: normal renal function, no significant electrolyte abnormality, glucose normal, LFT within normal limits  Trop: normal  UA: not c/w UTI  BNP not elev        Imaging Reviewed:   CT head negative  CT C spine negative  CT chest abd pelvis--R 7th and 8th rib fx otherwise neg  XR R knee neg    Course:  Tylenol PO given    Hospitalist Danial for Admission  1:27 PM    ED Room Number: ER01/01  Patient Name and age:  Nikolas Marcos 80 y.o.  female  Working Diagnosis:   1. Closed fracture of multiple ribs of right side, initial encounter    2. Fall, initial encounter      Readmission: no  Isolation Requirements:  no  Recommended Level of Care:  med/surg  Code Status:  Full Code  Department:[facility] ED - [phone]  Other:  Fall with multiple rib fx on R side, admit for pain control and resp monitoring    CONSULT NOTE:  1:41 PM Yifan Manuel DO communicated with Dr. Oly Joseph, Consult for Hospitalist via Doctor Alex Shaw. Discussed available diagnostic tests and clinical findings. Dr. Oly Joseph will evaluate the patient for admission to the hospital.         1:45 PM  Patient is being admitted to the hospital.  The results of their tests and reasons for their admission have been discussed with them and/or available family. They convey agreement and understanding for the need to be admitted and for their admission diagnosis. MDM:  80-year-old female presented to the ED today after ground-level fall. It sounds like it was mechanical rather than dizziness causing her fall. She injured her right chest wall and right knee. She also complained of some headache and neck pain. CT of head, neck, chest abdomen and pelvis in addition to the x-ray of the right knee were all performed. All were negative except for 2 rib fractures on the right side. She was having pain with breathing. Given her age and multiple rib fractures I was concerned that she may develop pneumonia. I recommended she be admitted for for respiratory monitoring and pain control. Patient was admitted in stable condition. Clinical Impression:     ICD-10-CM ICD-9-CM    1. Closed fracture of multiple ribs of right side, initial encounter S22.41XA 807.09    2.  Fall, initial encounter W19. Katie Damian E888.9            Disposition: Karen 8, DO

## 2019-08-21 LAB
ALBUMIN SERPL-MCNC: 3.5 G/DL (ref 3.5–5)
ALBUMIN/GLOB SERPL: 1.1 {RATIO} (ref 1.1–2.2)
ALP SERPL-CCNC: 77 U/L (ref 45–117)
ALT SERPL-CCNC: 9 U/L (ref 12–78)
ANION GAP SERPL CALC-SCNC: 8 MMOL/L (ref 5–15)
AST SERPL-CCNC: 12 U/L (ref 15–37)
BASOPHILS # BLD: 0 K/UL (ref 0–0.1)
BASOPHILS NFR BLD: 0 % (ref 0–1)
BILIRUB SERPL-MCNC: 0.9 MG/DL (ref 0.2–1)
BUN SERPL-MCNC: 18 MG/DL (ref 6–20)
BUN/CREAT SERPL: 21 (ref 12–20)
CALCIUM SERPL-MCNC: 9 MG/DL (ref 8.5–10.1)
CHLORIDE SERPL-SCNC: 113 MMOL/L (ref 97–108)
CO2 SERPL-SCNC: 21 MMOL/L (ref 21–32)
CREAT SERPL-MCNC: 0.85 MG/DL (ref 0.55–1.02)
DIFFERENTIAL METHOD BLD: ABNORMAL
EOSINOPHIL # BLD: 0.2 K/UL (ref 0–0.4)
EOSINOPHIL NFR BLD: 3 % (ref 0–7)
ERYTHROCYTE [DISTWIDTH] IN BLOOD BY AUTOMATED COUNT: 13.5 % (ref 11.5–14.5)
EST. AVERAGE GLUCOSE BLD GHB EST-MCNC: 117 MG/DL
GLOBULIN SER CALC-MCNC: 3.1 G/DL (ref 2–4)
GLUCOSE SERPL-MCNC: 86 MG/DL (ref 65–100)
HBA1C MFR BLD: 5.7 % (ref 4.2–6.3)
HCT VFR BLD AUTO: 48.6 % (ref 35–47)
HGB BLD-MCNC: 15.7 G/DL (ref 11.5–16)
IMM GRANULOCYTES # BLD AUTO: 0 K/UL (ref 0–0.04)
IMM GRANULOCYTES NFR BLD AUTO: 0 % (ref 0–0.5)
LYMPHOCYTES # BLD: 1.8 K/UL (ref 0.8–3.5)
LYMPHOCYTES NFR BLD: 27 % (ref 12–49)
MAGNESIUM SERPL-MCNC: 2.2 MG/DL (ref 1.6–2.4)
MCH RBC QN AUTO: 29.3 PG (ref 26–34)
MCHC RBC AUTO-ENTMCNC: 32.3 G/DL (ref 30–36.5)
MCV RBC AUTO: 90.7 FL (ref 80–99)
MONOCYTES # BLD: 0.8 K/UL (ref 0–1)
MONOCYTES NFR BLD: 12 % (ref 5–13)
NEUTS SEG # BLD: 3.8 K/UL (ref 1.8–8)
NEUTS SEG NFR BLD: 58 % (ref 32–75)
NRBC # BLD: 0 K/UL (ref 0–0.01)
NRBC BLD-RTO: 0 PER 100 WBC
PHOSPHATE SERPL-MCNC: 3 MG/DL (ref 2.6–4.7)
PLATELET # BLD AUTO: 186 K/UL (ref 150–400)
PMV BLD AUTO: 11.7 FL (ref 8.9–12.9)
POTASSIUM SERPL-SCNC: 4.2 MMOL/L (ref 3.5–5.1)
PROT SERPL-MCNC: 6.6 G/DL (ref 6.4–8.2)
RBC # BLD AUTO: 5.36 M/UL (ref 3.8–5.2)
SODIUM SERPL-SCNC: 142 MMOL/L (ref 136–145)
WBC # BLD AUTO: 6.7 K/UL (ref 3.6–11)

## 2019-08-21 PROCEDURE — 83036 HEMOGLOBIN GLYCOSYLATED A1C: CPT

## 2019-08-21 PROCEDURE — 84100 ASSAY OF PHOSPHORUS: CPT

## 2019-08-21 PROCEDURE — 83735 ASSAY OF MAGNESIUM: CPT

## 2019-08-21 PROCEDURE — 65660000000 HC RM CCU STEPDOWN

## 2019-08-21 PROCEDURE — 80053 COMPREHEN METABOLIC PANEL: CPT

## 2019-08-21 PROCEDURE — 85025 COMPLETE CBC W/AUTO DIFF WBC: CPT

## 2019-08-21 PROCEDURE — 74011250637 HC RX REV CODE- 250/637: Performed by: INTERNAL MEDICINE

## 2019-08-21 PROCEDURE — 36415 COLL VENOUS BLD VENIPUNCTURE: CPT

## 2019-08-21 PROCEDURE — 74011250636 HC RX REV CODE- 250/636: Performed by: INTERNAL MEDICINE

## 2019-08-21 RX ADMIN — Medication 10 ML: at 22:14

## 2019-08-21 RX ADMIN — OLANZAPINE 5 MG: 5 TABLET, FILM COATED ORAL at 22:14

## 2019-08-21 RX ADMIN — SPIRONOLACTONE 50 MG: 25 TABLET ORAL at 08:41

## 2019-08-21 RX ADMIN — CEFDINIR 300 MG: 300 CAPSULE ORAL at 06:34

## 2019-08-21 RX ADMIN — ENOXAPARIN SODIUM 40 MG: 100 INJECTION SUBCUTANEOUS at 22:13

## 2019-08-21 RX ADMIN — LATANOPROST 1 DROP: 50 SOLUTION/ DROPS OPHTHALMIC at 22:13

## 2019-08-21 RX ADMIN — TRAMADOL HYDROCHLORIDE 50 MG: 50 TABLET, FILM COATED ORAL at 06:34

## 2019-08-21 RX ADMIN — Medication 10 ML: at 06:34

## 2019-08-21 RX ADMIN — Medication 10 ML: at 14:00

## 2019-08-21 RX ADMIN — SPIRONOLACTONE 25 MG: 25 TABLET ORAL at 22:14

## 2019-08-21 RX ADMIN — CEFDINIR 300 MG: 300 CAPSULE ORAL at 17:36

## 2019-08-21 NOTE — PROGRESS NOTES
8/21/2019   CARE MANAGEMENT NOTE:  CM reviewed EMR and received handoff from ER . Pt was admitted with dx of rib fractures; pt with increasing falls. Reportedly, pt resides in an in-law suite at the back of her dtr Dalia's home. Efrain is on vacation this week with plan to return on Sunday, Aug. 25.    Dtr, Naina Vegas ([phone]) is an alternate family member while Efrain is on vacation. Khushi lives locally however all of her bedrooms are on the second floor. Transition Plan of Care:  1. PT/OT evals as needed to assess pt's current level of functioning and recs. 2.  SNF vs HH discussed and pt agrees to SNF for short term rehab ([facility]). 3.  Pt will need a three night qualifying hospital stay (discharge Friday if medically stable). CM will continue to follow pt for definitive discharge planning.   Grace

## 2019-08-21 NOTE — ROUTINE PROCESS
Interdisciplinary team rounds were held 8/21/2019 with the following team members:Care Management, Nursing, Nutrition, Pharmacy, Physical Therapy and Physician. Plan of care discussed. See clinical pathway and/or care plan for interventions and desired outcomes. Plan: pain control, possible placement at dc. Will need 3 nights prior to placement.

## 2019-08-21 NOTE — CONSULTS
Cardiology Consultation Note                                 Stefania Aragon MD, [address], [place], [id number] [place] [address]                         Phone [phone]; Fax [phone]            2019  9:33 Kiara Farah MD  :  1931   MRN:  [id number]     CC: fall and rib fx  Reason for consult: syncope with PPM  Admission Diagnosis: Rib fractures [S22.39XA]    ASSESSMENT/RECOMMENDATIONS:   1)syncope  -unclear if it was a fall or syncope  -would consider changing the spironolactone to ever  other day as long as was not on board for hypokalemia  -interrogate the PPM and have asked my  to have done      2) ABAD  -not wearing    3) KATHLEEN  -1+ pitting edema  -most likely from venous insufficiency and inactivity  -not sure if treating with diuretics will improve her edema although her BUN and creatinine did not demonstrate dehydration      4) HTN      5) T2DM               Maite Marin is a 80 y.o. female I am seeing for syncope . She has a significant past medical hx of orthostatic hypotension, atrial tachycardia , HTN, and PPM, ABAD. She has recently had several falls at home. She is now admitted with fall and 7 and 8th rib fx. She apparently tripped over a rug. Rosy Castelan has some dizziness at times. Daughter present in the room. Patient is extremely [place]. No chest pain or SOB. Inactive and lives separately but on same property as daughter.        Allergies   Allergen Reactions    Meperidine Hives     Other reaction(s): Hives    Ace Inhibitors Unknown (comments)    Aldactone [Spironolactone] Other (comments)     hyperkalemia    Codeine Unknown (comments)    Miralax [Polyethylene Glycol 3350] Nausea and Vomiting    Other Plant, Animal, Environmental Rash     Bilateral Hearing Aids cause severe reaction per pt report    Percocet [Oxycodone-Acetaminophen] Unknown (comments)    Statins-Hmg-Coa Reductase Inhibitors Unknown (comments)    Sular [Nisoldipine] Rash    Zetia [Ezetimibe] Unknown (comments)         Past Medical History:   Diagnosis Date    Depression     \"better since moving into my daughters mother-in-law suite\"    DJD (degenerative joint disease), lumbar     GERD (gastroesophageal reflux disease)     stricture.  Glaucoma     [place] (hard of hearing)     HTN (hypertension)     Keratoconjunctivitis     sicca    Macular degeneration     macular degeneration vs ooptic neuropathy    ABAD (obstructive sleep apnea)     decided not to use d/t frequent bathroom trips at night. didn't help    Osteopenia     Venous insufficiency 10/10/2011        Past Surgical History:   Procedure Laterality Date    BREAST SURGERY PROCEDURE UNLISTED  30 yrs ago    Breast Lumpectomy (side?)    EGD  2004    HX CHOLECYSTECTOMY  07/2018    HX COLONOSCOPY  2004    HX ENDOSCOPY  2004    HX HYSTERECTOMY      HX ORTHOPAEDIC  2005    cyst/tumor left hip    HX ORTHOPAEDIC  2014    Right Total Hip    Dr. Maurizio SCHNEIDER-W    HX PACEMAKER  11/4/2011    [facility] ZAMORA    HX PACEMAKER      STRESS TEST LEXISCAN/CARDIOLITE  10/3/11    normal perfusion, EF 72%        . Home Medications:  Prior to Admission Medications   Prescriptions Last Dose Informant Patient Reported? Taking? CARBOXYMETHYLCELLULOSE SODIUM (REFRESH OP)  Child Yes Yes   Sig: Apply 1 Drop to eye as needed (dry eye). OLANZapine (ZYPREXA) 5 mg tablet 8/19/2019 at Unknown time Child No Yes   Sig: Take 1 Tab by mouth nightly. acetaminophen (TYLENOL) 500 mg tablet 8/19/2019 at pm Child Yes Yes   Sig: Take 500 mg by mouth every eight (8) hours as needed for Pain. dexamethasone (DECADRON) 0.1 % ophthalmic solution  Child No Yes   Sig: Apply 1-2 drops into each ear PRN   ergocalciferol (ERGOCALCIFEROL) 50,000 unit capsule 8/18/2019 Child No Yes   Sig: Take 1 Cap by mouth every seven (7) days.    latanoprost (XALATAN) 0.005 % ophthalmic solution 8/19/2019 at Unknown time Child Yes Yes   Sig: Administer 1 Drop to both eyes nightly. spironolactone (ALDACTONE) 50 mg tablet 8/19/2019 at Unknown time Child Yes Yes   Sig: Take 50 mg by mouth daily. spironolactone (ALDACTONE) 50 mg tablet 8/19/2019 at Unknown time Child Yes Yes   Sig: Take 25 mg by mouth nightly. Facility-Administered Medications: None       Hospital Medications:  Current Facility-Administered Medications   Medication Dose Route Frequency    sodium chloride (NS) flush 5-40 mL  5-40 mL IntraVENous Q8H    sodium chloride (NS) flush 5-40 mL  5-40 mL IntraVENous PRN    acetaminophen (TYLENOL) tablet 650 mg  650 mg Oral Q6H PRN    naloxone (NARCAN) injection 0.4 mg  0.4 mg IntraVENous PRN    traMADol (ULTRAM) tablet 50 mg  50 mg Oral Q6H PRN    enoxaparin (LOVENOX) injection 40 mg  40 mg SubCUTAneous Q24H    carboxymethylcellulose sodium (CELLUVISC) 1 % ophthalmic solution 2 Drop  2 Drop Both Eyes PRN    latanoprost (XALATAN) 0.005 % ophthalmic solution 1 Drop  1 Drop Both Eyes QHS    OLANZapine (ZyPREXA) tablet 5 mg  5 mg Oral QHS    spironolactone (ALDACTONE) tablet 50 mg  50 mg Oral DAILY    spironolactone (ALDACTONE) tablet 25 mg  25 mg Oral QHS    cefdinir (OMNICEF) capsule 300 mg  300 mg Oral Q12H          OBJECTIVE       Laboratory and Imaging have been reviewed and are notable for          Diagnostic Tests:     Recent Labs     08/20/19  1002   TROIQ <0.05     Recent Labs     08/21/19  0321 08/20/19  1002    142   K 4.2 4.4   CO2 21 24   BUN 18 21*   CREA 0.85 0.98   GLU 86 116*   PHOS 3.0  --    MG 2.2  --    WBC 6.7 6.9   HGB 15.7 16.1*   HCT 48.6* 48.4*    173         Cardiac work up to date:  Pacer: Kyleigh ZAMORA implanted 11/4/11 Boo Cheng.      Echocardiogram(8/20/19): ef 56-60%, mild AI    lexiscan (9/10/14): normal perfusion                   Social History:  Social History     Tobacco Use    Smoking status: Never Smoker    Smokeless tobacco: Never Used   Substance Use Topics    Alcohol use: No       Family History:  Family History   Problem Relation Age of Onset    Heart Failure Mother     Psychiatric Disorder Mother     Heart Disease Father     Stroke Brother     Psychiatric Disorder Other         aunt       Review of Symptoms:  A comprehensive review of systems was negative except for that written in the HPI. Physical Exam:      Visit Vitals  /79 (BP 1 Location: Left arm, BP Patient Position: Sitting)   Pulse 65   Temp 97.4 °F (36.3 °C)   Resp 17   Ht 4' 11\" (1.499 m)   Wt 169 lb 12.1 oz (77 kg)   SpO2 91%   Breastfeeding? No   BMI 34.29 kg/m²     General Appearance:  Well developed, well nourished,alert and oriented x 3, and individual in no acute distress. Ears/Nose/Mouth/Throat:   Hearing grossly normal.Normal oral mucosa,no scleral icterus     Neck: Supple no JVD or bruits,no cervical lymphadenopathy   Chest:   Lungs clear to auscultation bilaterally but poor effort   Cardiovascular:  Regular rate and rhythm, and reviewed monitor without significant arrhthymias. Abdomen:   Soft, non-tender, bowel sounds are active. No abdominal bruits   Extremities: + edema bilaterally. Pulses detected, no varicosities   Skin: Warm and dry. No bruising  Neuro  Moves all extermities and neurologically intact                                                       I have discussed the diagnosis with the patient and the intended plan as seen in the above orders. Questions were answered concerning future plans. I have discussed medication side effects and warnings with the patient as well. Sabrina Leiva is in agreement to the plan listed above and wishes to proceed. she  was instructed not to smoke, eat heart healthy diet  and to exercise. Thank you for this consult.       Carmen Juares MD

## 2019-08-21 NOTE — PROGRESS NOTES
[facility]  2019 [facility], [address] [place] [address]  [phone]      Medical Progress Note      NAME: Maite Marin   :  1931  MRM:  [id number]    Date/Time: 2019  10:06 AM       Assessment and Plan:   1.  Rib fractures: Acute right lateral seventh and eighth rib fractures. No pneumothorax or contusion. Traumatic, as a result of GLF. Pain control. Continue spirometry and pulmonary toilet. PT/OT, fall precautions.      2. Fall: frequent falls over the past few moths. Of note, pt has vision problems and is hard of hearing. PT/OT, fall precautions. CM consult for suspected need for SNF placement,       3.  Type 2 diabetes with nephropathy: NOT on home meds. doubt this Dx. A1C is 5.8. No BG check.      4. Lower extremity edema: LE dopplers: negative for DVT.      5.  Essential hypertension: no on meds at home. Monitor      6. ABAD (obstructive sleep apnea): CPAP QHS if tolerated     7.  Esophageal stricture/ GERD (gastroesophageal reflux disease) (): can use PPI PRN     8.  Macular degeneration / Glaucoma / Ptosis of eyelid: continue eye drops. Outpatient follow up. Supportive care. Fall precautions. OT eval and treat     9. Depression (): continue home psychotropics     10. Presence of cardiac pacemaker: monitor on remote tele for now        Code Status: DNR             Subjective:     Chief Complaint:  Follow up of pt who was admitted with rib fracture. Still has RT side rib pain. ROS:  (bold if positive, if negative)      Tolerating PT  Tolerating Diet        Objective:     Last 24hrs VS reviewed since prior progress note. Most recent are:    Visit Vitals  /79 (BP 1 Location: Left arm, BP Patient Position: Sitting)   Pulse 65   Temp 97.4 °F (36.3 °C)   Resp 17   Ht 4' 11\" (1.499 m)   Wt 77 kg (169 lb 12.1 oz)   SpO2 91%   Breastfeeding?  No   BMI 34.29 kg/m²     SpO2 Readings from Last 6 Encounters:   19 91%   19 96%   19 94%   10/16/18 95%   09/17/18 94%   08/30/18 98%            Intake/Output Summary (Last 24 hours) at 8/21/2019 1006  Last data filed at 8/21/2019 0327  Gross per 24 hour   Intake 240 ml   Output 200 ml   Net 40 ml        Physical Exam:    Gen:  Well-developed, well-nourished, in no acute distress  HEENT:  Pink conjunctivae, PERRL, hearing intact to voice, moist mucous membranes  Neck:  Supple, without masses, thyroid non-tender  Resp:  No accessory muscle use, clear breath sounds without wheezes rales or rhonchi  Card:  No murmurs, normal S1, S2 without thrills, bruits or peripheral edema  Abd:  Soft, non-tender, non-distended, normoactive bowel sounds are present, no palpable organomegaly and no detectable hernias  Lymph:  No cervical or inguinal adenopathy  Musc:  No cyanosis or clubbing  Skin:  No rashes or ulcers, skin turgor is good  Neuro:  Cranial nerves are grossly intact, no focal motor weakness, follows commands appropriately  Psych:  Good insight, oriented to person, place and time, alert  __________________________________________________________________  Medications Reviewed: (see below)  Medications:     Current Facility-Administered Medications   Medication Dose Route Frequency    sodium chloride (NS) flush 5-40 mL  5-40 mL IntraVENous Q8H    sodium chloride (NS) flush 5-40 mL  5-40 mL IntraVENous PRN    acetaminophen (TYLENOL) tablet 650 mg  650 mg Oral Q6H PRN    naloxone (NARCAN) injection 0.4 mg  0.4 mg IntraVENous PRN    traMADol (ULTRAM) tablet 50 mg  50 mg Oral Q6H PRN    enoxaparin (LOVENOX) injection 40 mg  40 mg SubCUTAneous Q24H    carboxymethylcellulose sodium (CELLUVISC) 1 % ophthalmic solution 2 Drop  2 Drop Both Eyes PRN    latanoprost (XALATAN) 0.005 % ophthalmic solution 1 Drop  1 Drop Both Eyes QHS    OLANZapine (ZyPREXA) tablet 5 mg  5 mg Oral QHS    spironolactone (ALDACTONE) tablet 50 mg  50 mg Oral DAILY    spironolactone (ALDACTONE) tablet 25 mg  25 mg Oral QHS    cefdinir (OMNICEF) capsule 300 mg  300 mg Oral Q12H        Lab Data Reviewed: (see below)  Lab Review:     Recent Labs     08/21/19  0321 08/20/19  1002   WBC 6.7 6.9   HGB 15.7 16.1*   HCT 48.6* 48.4*    173     Recent Labs     08/21/19  0321 08/20/19  1002    142   K 4.2 4.4   * 111*   CO2 21 24   GLU 86 116*   BUN 18 21*   CREA 0.85 0.98   CA 9.0 9.1   MG 2.2  --    PHOS 3.0  --    ALB 3.5 3.6   TBILI 0.9 0.7   SGOT 12* 17   ALT 9* 10*     Lab Results   Component Value Date/Time    Glucose (POC) 166 (H) 05/26/2010 10:16 AM     No results for input(s): PH, PCO2, PO2, HCO3, FIO2 in the last 72 hours. No results for input(s): INR in the last 72 hours. No lab exists for component: INREXT  All Micro Results     Procedure Component Value Units Date/Time    CULTURE, URINE [[id number]] Collected:  08/20/19 1341    Order Status:  Completed Updated:  08/20/19 1631          I have reviewed notes of prior 24hr. Other pertinent lab:       Total time spent with patient: [address] discussed with: Patient, Nursing Staff and >50% of time spent in counseling and coordination of care    Discussed:  Care Plan    Prophylaxis:  Lovenox    Disposition:  SNF/LTC           ___________________________________________________    Attending Physician: Judie Manning MD

## 2019-08-21 NOTE — PROGRESS NOTES
Bedside and Verbal shift change report given to Stoney Peters RN (oncoming nurse) by Dakota Arteaga RN (offgoing nurse). Report included the following information SBAR, Kardex, Intake/Output, MAR, Recent Results, Med Rec Status and Cardiac Rhythm paced.

## 2019-08-22 LAB
BACTERIA SPEC CULT: NORMAL
CC UR VC: NORMAL
SERVICE CMNT-IMP: NORMAL

## 2019-08-22 PROCEDURE — 65270000029 HC RM PRIVATE

## 2019-08-22 PROCEDURE — 74011000250 HC RX REV CODE- 250: Performed by: INTERNAL MEDICINE

## 2019-08-22 PROCEDURE — 97530 THERAPEUTIC ACTIVITIES: CPT

## 2019-08-22 PROCEDURE — 97165 OT EVAL LOW COMPLEX 30 MIN: CPT

## 2019-08-22 PROCEDURE — 97162 PT EVAL MOD COMPLEX 30 MIN: CPT

## 2019-08-22 PROCEDURE — 74011250637 HC RX REV CODE- 250/637: Performed by: INTERNAL MEDICINE

## 2019-08-22 PROCEDURE — 74011250636 HC RX REV CODE- 250/636: Performed by: INTERNAL MEDICINE

## 2019-08-22 PROCEDURE — 97535 SELF CARE MNGMENT TRAINING: CPT

## 2019-08-22 PROCEDURE — 97116 GAIT TRAINING THERAPY: CPT

## 2019-08-22 RX ORDER — LIDOCAINE 4 G/100G
1 PATCH TOPICAL EVERY 24 HOURS
Status: DISCONTINUED | OUTPATIENT
Start: 2019-08-22 | End: 2019-08-23 | Stop reason: HOSPADM

## 2019-08-22 RX ADMIN — Medication 10 ML: at 15:08

## 2019-08-22 RX ADMIN — OLANZAPINE 5 MG: 5 TABLET, FILM COATED ORAL at 21:14

## 2019-08-22 RX ADMIN — CEFDINIR 300 MG: 300 CAPSULE ORAL at 05:50

## 2019-08-22 RX ADMIN — Medication 10 ML: at 05:50

## 2019-08-22 RX ADMIN — Medication 10 ML: at 21:15

## 2019-08-22 RX ADMIN — ENOXAPARIN SODIUM 40 MG: 100 INJECTION SUBCUTANEOUS at 21:14

## 2019-08-22 RX ADMIN — SPIRONOLACTONE 25 MG: 25 TABLET ORAL at 21:14

## 2019-08-22 RX ADMIN — LATANOPROST 1 DROP: 50 SOLUTION/ DROPS OPHTHALMIC at 21:33

## 2019-08-22 RX ADMIN — SPIRONOLACTONE 50 MG: 25 TABLET ORAL at 08:54

## 2019-08-22 NOTE — PROGRESS NOTES
Problem: Mobility Impaired (Adult and Pediatric)  Goal: *Acute Goals and Plan of Care (Insert Text)  Description  FUNCTIONAL STATUS PRIOR TO ADMISSION: Patient was independent and active without use of DME.    HOME SUPPORT PRIOR TO ADMISSION: The patient lived alone with daughter to provide assistance. - patient live in mother-in-law suite at [facility] home, daughter and son-in-law work part-time    Physical Therapy Goals  Initiated 8/22/2019  1. Patient will move from supine to sit and sit to supine  in bed with modified independence within 7 day(s). 2.  Patient will transfer from bed to chair and chair to bed with supervision/set-up using the least restrictive device within 7 day(s). 3.  Patient will perform sit to stand with supervision/set-up within 7 day(s). 4.  Patient will ambulate with supervision/set-up for 150 feet with the least restrictive device within 7 day(s). 5.  Patient will ascend/descend 4 stairs with 2 handrail(s) with supervision/set-up within 7 day(s). Outcome: Progressing Towards Goal  Note:   PHYSICAL THERAPY EVALUATION  Patient: Varinder Peters (12 y.o. female)  Date: 8/22/2019  Primary Diagnosis: Rib fractures [S22.39XA]        Precautions:   Fall      ASSESSMENT  Based on the objective data described below, the patient presents with decreased strength, balance and endurance following admission s/p fall and found to have 7-8th rib fractures. Patient with increased falls at home, and normally does not ambulate with a device but family states they have tried to get her to use a SPC but she does not use. Patient today able to participate and follow all commands. She was able to ambulate with and without an assistive device. Without device increased trunk sway and loss of balance with turning, with RW demonstrates improved stability. Patient vitals were obtained and patient is not orthostatic (see below) but she does endorse dizziness with transitional movements.      Current Level of Function Impacting Discharge (mobility/balance): requires new assistive device for safety, CGA-MIN A for upright activity    Functional Outcome Measure: The patient scored 55/100 on the Barthel Index outcome measure. Other factors to consider for discharge: frequent fall history, lives alone but has family support     Patient will benefit from skilled therapy intervention to address the above noted impairments. PLAN :  Recommendations and Planned Interventions: bed mobility training, transfer training, gait training, therapeutic exercises, neuromuscular re-education and therapeutic activities      Frequency/Duration: Patient will be followed by physical therapy:  5 times a week to address goals. Recommendation for discharge: (in order for the patient to meet his/her long term goals)  Therapy up to 5 days/week in SNF setting    This discharge recommendation:  Has been made in collaboration with the attending provider and/or case management    Equipment recommendations for successful discharge (if) home: to be determined by rehab facility         SUBJECTIVE:   Patient stated Lena Russell have been in the same gown since I was in the ED.    OBJECTIVE DATA SUMMARY:   HISTORY:    Past Medical History:   Diagnosis Date    Depression     \"better since moving into my daughters mother-in-law suite\"    DJD (degenerative joint disease), lumbar     GERD (gastroesophageal reflux disease)     stricture. Glaucoma     [place] (hard of hearing)     HTN (hypertension)     Keratoconjunctivitis     sicca    Macular degeneration     macular degeneration vs ooptic neuropathy    ABAD (obstructive sleep apnea)     decided not to use d/t frequent bathroom trips at night.  didn't help    Osteopenia     Venous insufficiency 10/10/2011     Past Surgical History:   Procedure Laterality Date    BREAST SURGERY PROCEDURE UNLISTED  30 yrs ago    Breast Lumpectomy (side?)    EGD  2004    HX CHOLECYSTECTOMY  07/2018    HX COLONOSCOPY 2004    HX ENDOSCOPY  2004    HX HYSTERECTOMY      HX ORTHOPAEDIC  2005    cyst/tumor left hip    HX ORTHOPAEDIC  2014    Right Total Hip    Dr. Pepe Kohler J-W    HX PACEMAKER  11/04/2011    Emani ZAMORA, Dr Elver Rome OP    HX PACEMAKER      STRESS TEST LEXISCAN/CARDIOLITE  10/3/11    normal perfusion, EF 72%       Personal factors and/or comorbidities impacting plan of care: see below    [address]: Private residence  # Steps to Enter: 0  One/Two Story Residence: One story  # of Interior Steps: 0  Lift Chair Available: No  Living Alone: Yes(on daughter's property, but not in home)  Support Systems: Child(selene), Family member(s)  Patient Expects to be Discharged to[de-identified] Rehabilitation facility  Current DME Used/Available at Home: [address], [place], straight, Walker, rolling, Shower chair, Raised toilet seat  Tub or Shower Type: Shower    EXAMINATION/PRESENTATION/DECISION MAKING:   Vitals    Blood pressure Heart rate(bpm) Oxygen saturation  Sitting  158/75   79  Standing 166/99   66  Post activity 163/83   66    Critical Behavior:  Neurologic State: Alert  Orientation Level: Oriented to person, Oriented to place, Oriented to time  Cognition: Follows commands  Safety/Judgement: Awareness of environment  Hearing: Auditory  Auditory Impairment: Hard of hearing, bilateral  Skin:  all exposed intact  Edema: none noted  Range Of Motion:  AROM: Generally decreased, functional           PROM: Within functional limits           Strength:    Strength: Generally decreased, functional                    Tone & Sensation:   Tone: Normal              Sensation: Intact               Coordination:  Coordination: Within functional limits  Vision:   Acuity: (glaucoma and macular degeneration)  Corrective Lenses: Reading glasses  Functional Mobility:  Bed Mobility:              Transfers:  Sit to Stand: Minimum assistance(Simultaneous filing.  User may not have seen previous data.)  Stand to Sit: Minimum assistance(Simultaneous filing. User may not have seen previous data.)        Bed to Chair: Minimum assistance              Balance:   Sitting: Intact  Standing: Impaired  Standing - Static: Constant support  Standing - Dynamic : Fair  Ambulation/Gait Training:  Distance (ft): 50 Feet (ft)  Assistive Device: Gait belt;Walker, rolling  Ambulation - Level of Assistance: Contact guard assistance     Gait Description (WDL): Exceptions to WDL        Functional Measure:  Barthel Index:    Bathin(Simultaneous filing. User may not have seen previous data.)  Bladder: 5(Simultaneous filing. User may not have seen previous data.)  Bowels: 10(Simultaneous filing. User may not have seen previous data.)  Groomin(Simultaneous filing. User may not have seen previous data.)  Dressin(Simultaneous filing. User may not have seen previous data.)  Feeding: 10(Simultaneous filing. User may not have seen previous data.)  Mobility: 5(Simultaneous filing. User may not have seen previous data.)  Stairs: 0(Simultaneous filing. User may not have seen previous data.)  Toilet Use: 5(Simultaneous filing. User may not have seen previous data.)  Transfer (Bed to Chair and Back): 10(Simultaneous filing. User may not have seen previous data.)  Total: 55(Simultaneous filing. User may not have seen previous data.)/100       The Barthel ADL Index: Guidelines  1. The index should be used as a record of what a patient does, not as a record of what a patient could do. 2. The main aim is to establish degree of independence from any help, physical or verbal, however minor and for whatever reason. 3. The need for supervision renders the patient not independent. 4. A patient's performance should be established using the best available evidence. Asking the patient, friends/relatives and nurses are the usual sources, but direct observation and common sense are also important. However direct testing is not needed.   5. Usually the patient's performance over the preceding 24-48 hours is important, but occasionally longer periods will be relevant. 6. Middle categories imply that the patient supplies over 50 per cent of the effort. 7. Use of aids to be independent is allowed. Blessing Gonzalez., Barthel, D.W. ([id number]). Functional evaluation: the Barthel Index. [address] [facility] (14)2. GISELE Guzmán, Michelle Spence., Milagros Escalera., [place], [address] [place] [address] (1999). Measuring the change indisability after inpatient rehabilitation; comparison of the responsiveness of the Barthel Index and Functional [place] Measure. Journal of Neurology, Neurosurgery, and Psychiatry, 66(4), 332-661. Letha Ledezma, N.J.A, KIMMY Dobson, & Shirley Gusman MDENNIS. (2004.) Assessment of post-stroke quality of life in cost-effectiveness studies: The usefulness of the Barthel Index and the EuroQoL-5D. Quality of Life Research, 15, 273-64           Physical Therapy Evaluation Charge Determination   History Examination Presentation Decision-Making   HIGH Complexity :3+ comorbidities / personal factors will impact the outcome/ POC  HIGH Complexity : 4+ Standardized tests and measures addressing body structure, function, activity limitation and / or participation in recreation  MEDIUM Complexity : Evolving with changing characteristics  Other outcome measures Barthel INdex  MEDIUM      Based on the above components, the patient evaluation is determined to be of the following complexity level: MEDIUM    Pain Rating:  None reported    Activity Tolerance:   Good  Please refer to the flowsheet for vital signs taken during this treatment. After treatment patient left in no apparent distress:   Sitting in chair, Call bell within reach, Bed / chair alarm activated and Caregiver / family present    COMMUNICATION/EDUCATION:   The patients plan of care was discussed with: Occupational Therapist and Registered Nurse.     Fall prevention education was provided and the patient/caregiver indicated understanding., Patient/family have participated as able in goal setting and plan of care. and Patient/family agree to work toward stated goals and plan of care.     Thank you for this referral.  Eliazar Bronson, PT, DPT   Time Calculation: 30 mins

## 2019-08-22 NOTE — PROGRESS NOTES
Problem: Self Care Deficits Care Plan (Adult)  Goal: *Acute Goals and Plan of Care (Insert Text)  Description    FUNCTIONAL STATUS PRIOR TO ADMISSION: Patient was independent for functional mobility. HOME SUPPORT: The patient lived alone with daughter nearby to provide assistance. Occupational Therapy Goals  Initiated 8/22/2019  1. Patient will perform grooming with modified independence within 7 day(s). 2.  Patient will perform bathing with modified independence within 7 day(s). 3.  Patient will perform lower body dressing with supervision/set-up within 7 day(s). 4.  Patient will perform toilet transfers with supervision/set-up within 7 day(s). 5.  Patient will perform all aspects of toileting with supervision/set-up within 7 day(s). 6.  Patient will participate in upper extremity therapeutic exercise/activities with supervision/set-up for 10 minutes within 7 day(s). 7.  Patient will utilize energy conservation techniques during functional activities with verbal cues within 7 day(s). Outcome: Progressing Towards Goal   OCCUPATIONAL THERAPY EVALUATION  Patient: Hamilton Simmonds (59 y.o. female)  Date: 8/22/2019  Primary Diagnosis: Rib fractures [S22.39XA]        Precautions:  Fall    ASSESSMENT  Based on the objective data described below, the patient presents with decreased functional mobility, decreased balance, and safety following admission for fall resulting in rib fractures. Patient reports living in in law suite on daughter's property but does not require assist of daughter physically. Patient now with multiple falls recently and overall decreased functional ability. Patient today requires min assist for transfers, mod assist for LE ADLs and verbal cues for safest technique. Patient will benefit from continued OT services to increase safety and independence and maximize function.     Current Level of Function Impacting Discharge (ADLs/self-care): min for transfers, increased assist for LE ADLs    Functional Outcome Measure: The patient scored 55/100 on the Barthel Index outcome measure. Other factors to consider for discharge: lives alone, daughter nearby often not home     Patient will benefit from skilled therapy intervention to address the above noted impairments. PLAN :  Recommendations and Planned Interventions: self care training, functional mobility training, therapeutic exercise, balance training, therapeutic activities, endurance activities, patient education and home safety training    Frequency/Duration: Patient will be followed by occupational therapy 5 times a week to address goals. Recommendation for discharge: (in order for the patient to meet his/her long term goals)  Therapy up to 5 days/week in SNF setting    This discharge recommendation:  Has not yet been discussed the attending provider and/or case management    Equipment recommendations for successful discharge (if) home: none       SUBJECTIVE:   Patient stated I am 80years old.     OBJECTIVE DATA SUMMARY:   HISTORY:   Past Medical History:   Diagnosis Date    Depression     \"better since moving into my daughters mother-in-law suite\"    DJD (degenerative joint disease), lumbar     GERD (gastroesophageal reflux disease)     stricture. Glaucoma     [place] (hard of hearing)     HTN (hypertension)     Keratoconjunctivitis     sicca    Macular degeneration     macular degeneration vs ooptic neuropathy    ABAD (obstructive sleep apnea)     decided not to use d/t frequent bathroom trips at night.  didn't help    Osteopenia     Venous insufficiency 10/10/2011     Past Surgical History:   Procedure Laterality Date    BREAST SURGERY PROCEDURE UNLISTED  30 yrs ago    Breast Lumpectomy (side?)    EGD  2004    HX CHOLECYSTECTOMY  07/2018    HX COLONOSCOPY  2004    HX ENDOSCOPY  2004    HX HYSTERECTOMY      HX ORTHOPAEDIC  2005    cyst/tumor left hip    HX ORTHOPAEDIC  2014    Right Total Hip    Dr. Sarah Silva J-W    HX PACEMAKER  11/04/2011    Keila ZAMORA, Dr Mariama Victoria OP    HX PACEMAKER      STRESS TEST LEXISCAN/CARDIOLITE  10/3/11    normal perfusion, EF 72%       Expanded or extensive additional review of patient history:     Home Situation  Home Environment: Private residence  # Steps to Enter: 0  One/Two Story Residence: One story  # of Interior Steps: 0  Lift Chair Available: No  Living Alone: Yes(on daughter's property, but not in home)  Support Systems: Child(selene), Family member(s)  Patient Expects to be Discharged to[de-identified] Rehabilitation facility  Current DME Used/Available at Home: Selvin Groom, straight, Walker, rolling, Shower chair, Raised toilet seat  Tub or Shower Type: Shower    Hand dominance: Right    EXAMINATION OF PERFORMANCE DEFICITS:  Cognitive/Behavioral Status:  Neurologic State: Alert  Orientation Level: Oriented to person;Oriented to place;Oriented to time  Cognition: Follows commands  Perception: Appears intact  Perseveration: No perseveration noted  Safety/Judgement: Awareness of environment    Skin: intact as seen    Edema: none noted    Hearing: Auditory  Auditory Impairment: Hard of hearing, bilateral    Vision/Perceptual:                           Acuity: (glaucoma and macular degeneration)    Corrective Lenses: Reading glasses    Range of Motion:  AROM: Generally decreased, functional  PROM: Within functional limits                      Strength:  Strength: Generally decreased, functional                Coordination:  Coordination: Within functional limits            Tone & Sensation:    Tone: Normal  Sensation: Intact                      Balance:  Sitting: Intact  Standing: Impaired  Standing - Static: Constant support  Standing - Dynamic : Fair    Functional Mobility and Transfers for ADLs:  Bed Mobility:       Transfers:  Sit to Stand: Minimum assistance(Simultaneous filing. User may not have seen previous data.)  Stand to Sit: Minimum assistance(Simultaneous filing.  User may not have seen previous data.)  Bed to Chair: Minimum assistance  Bathroom Mobility: Minimum assistance  Toilet Transfer : Minimum assistance  Assistive Device : Walker, rollator    ADL Assessment:  Feeding: Supervision    Oral Facial Hygiene/Grooming: Contact guard assistance    Bathing: Minimum assistance    Upper Body Dressing: Minimum assistance    Lower Body Dressing: Moderate assistance    Toileting: Minimum assistance                ADL Intervention and task modifications:                                     Cognitive Retraining  Safety/Judgement: Awareness of environment    Therapeutic Exercise:     Functional Measure:  Barthel Index:    Bathin(Simultaneous filing. User may not have seen previous data.)  Bladder: 5(Simultaneous filing. User may not have seen previous data.)  Bowels: 10(Simultaneous filing. User may not have seen previous data.)  Groomin(Simultaneous filing. User may not have seen previous data.)  Dressin(Simultaneous filing. User may not have seen previous data.)  Feeding: 10(Simultaneous filing. User may not have seen previous data.)  Mobility: 5(Simultaneous filing. User may not have seen previous data.)  Stairs: 0(Simultaneous filing. User may not have seen previous data.)  Toilet Use: 5(Simultaneous filing. User may not have seen previous data.)  Transfer (Bed to Chair and Back): 10(Simultaneous filing. User may not have seen previous data.)  Total: 55(Simultaneous filing. User may not have seen previous data.)/100        Percentage of impairment   0%   1-19%   20-39%   40-59%   60-79%   80-99%   100%   Barthel Score 0-100 100 99-80 79-60 59-40 20-39 1-19   0     The Barthel ADL Index: Guidelines  1. The index should be used as a record of what a patient does, not as a record of what a patient could do. 2. The main aim is to establish degree of independence from any help, physical or verbal, however minor and for whatever reason. 3. The need for supervision renders the patient not independent.   4. A patient's performance should be established using the best available evidence. Asking the patient, friends/relatives and nurses are the usual sources, but direct observation and common sense are also important. However direct testing is not needed. 5. Usually the patient's performance over the preceding 24-48 hours is important, but occasionally longer periods will be relevant. 6. Middle categories imply that the patient supplies over 50 per cent of the effort. 7. Use of aids to be independent is allowed. Monster Barney., Barthel, D.W. ([id number]). Functional evaluation: the Barthel Index. [address] [facility] (14)2. Uzma Jose jose roberto CAMERON PerkinsF, Talib Patten., Percy Gordillo., [place], [address] [place] [address] Ave (1999). Measuring the change indisability after inpatient rehabilitation; comparison of the responsiveness of the Barthel Index and Functional [place] Measure. Journal of Neurology, Neurosurgery, and Psychiatry, 66(4), 276-908. Giancarlo Roth, N.J.A, KIMMY Dobson, & Bayard Peabody, MSavannaA. (2004.) Assessment of post-stroke quality of life in cost-effectiveness studies: The usefulness of the Barthel Index and the EuroQoL-5D. Quality of Life Research, 15, 484-80         Occupational Therapy Evaluation Charge Determination   History Examination Decision-Making   LOW Complexity : Brief history review  LOW Complexity : 1-3 performance deficits relating to physical, cognitive , or psychosocial skils that result in activity limitations and / or participation restrictions  LOW Complexity : No comorbidities that affect functional and no verbal or physical assistance needed to complete eval tasks       Based on the above components, the patient evaluation is determined to be of the following complexity level: LOW   Pain Rating:      Activity Tolerance:   Good  Please refer to the flowsheet for vital signs taken during this treatment.     After treatment patient left in no apparent distress:    Sitting in chair, Call bell within reach, Bed / chair alarm activated and Caregiver / family present    COMMUNICATION/EDUCATION:   The patients plan of care was discussed with: Physical Therapist and Registered Nurse. Home safety education was provided and the patient/caregiver indicated understanding., Patient/family have participated as able in goal setting and plan of care. and Patient/family agree to work toward stated goals and plan of care. This patients plan of care is appropriate for delegation to [facility].     Thank you for this referral.  Iram Alvarado OTR/L  Time Calculation: 36 mins

## 2019-08-22 NOTE — PROGRESS NOTES
Cardiology Progress Note         NAME:  Eleni Reason   :   1931   MRN:   [id number]     Assessment/Plan:   1,  GLF: unclear if was a syncopal event vs tripped on throw rug: ck orthostatic BP's. Pacer interrogation reveals no issues . Visual and hearing issues. PT/OT. 2.  HTN: treated with aldactone  3. T 2 DM: A 1 C 5.8   4. Rib fractures:  Acute right lateral seventh and eighth rib fractures. No pneumothorax or contusion. Incentive spirometry. 5.  LE edema: no DVT suspect venous insufficiency  6. Hx atrial tach s/p PPM in . Pacer interrogation shows no AT/AF burden. CARDIOLOGY ATTENDING  Patient personally seen and examined. All the elements of history and examination were personally performed. Assessment and plan was discussed and agree as written above    She is doing OK with plans for rehab. Advised support hose, hydration, keep HOB elevated. Adlactone dose cut back. Will sign off. She will follow with her cardiologist as outpatient. Nishant Cuenca MD, [facility]          Subjective:   Admitted after GLF in her apartment. Was taking trash to disposal area and upon returning to [facility] she fell in living room . Per pt was on floor for about an hour before she could get up. Cardiac ROS: Patient denies any exertional chest pain, dyspnea, palpitations, syncope, orthopnea, edema or paroxysmal nocturnal dyspnea. Previous Cardiac Eval  19   ECHO ADULT COMPLETE 2019    Narrative · Definity contrast was given to enhance imaging. · Image quality: technically difficult. · Left Ventricle: Normal cavity size, wall thickness and systolic function   (ejection fraction normal). Estimated left ventricular ejection fraction   is 56 - 60%. Suboptimal endocardial visualization limits wall motion   analysis  · Right Ventricle: Not well visualized. · Aortic Valve: Aortic Valve regurgitation is mild.         Signed by: Edilia Vital MD Review of Systems: Pain R rib area worse with lying down. No nausea, indigestion, vomiting, cough, sputum. No bleeding. Taking po. Objective:     Visit Vitals  /72 (BP 1 Location: Left arm, BP Patient Position: At rest)   Pulse 70   Temp 97.6 °F (36.4 °C)   Resp 16   Ht 4' 11\" (1.499 m)   Wt 169 lb 12.1 oz (77 kg)   SpO2 91%   Breastfeeding? No   BMI 34.29 kg/m²      O2 Device: Room air    Temp (24hrs), Av.5 °F (36.4 °C), Min:97.3 °F (36.3 °C), Max:97.7 °F (36.5 °C)      No intake/output data recorded.  1901 -  0700  In: 120 [P.O.:120]  Out: 450 [Urine:450]  TELE: paced     General: AAOx3 cooperative, no acute distress. [place]   HEENT: Atraumatic. Pink and moist.  Anicteric sclerae. Neck : Supple  Lungs: CTA bilaterally. No wheezing/rhonchi/rales. Heart: Regular rhythm, no murmur, no rubs, no gallops. No JVD. No carotid bruits. Abdomen: Soft, non-distended, non-tender. + Bowel sounds. Extremities: + 1 LE edema. Neurologic: Grossly intact. Alert and oriented X 3. No acute neurological distress. Psych: Good insight. Not anxious or agitated. Care Plan discussed with:    Comments   Patient x    Family      RN     Care Manager x                   Consultant:          Data Review:     No lab exists for component: ITNL   Recent Labs     19  1002   TROIQ <0.05     Recent Labs     19  0321 19  1002    142   K 4.2 4.4   * 111*   CO2 21 24   BUN 18 21*   CREA 0.85 0.98   GLU 86 116*   PHOS 3.0  --    MG 2.2  --    ALB 3.5 3.6   WBC 6.7 6.9   HGB 15.7 16.1*   HCT 48.6* 48.4*    173     No results for input(s): INR, PTP, APTT in the last 72 hours.     No lab exists for component: INREXT    Medications reviewed  Current Facility-Administered Medications   Medication Dose Route Frequency    sodium chloride (NS) flush 5-40 mL  5-40 mL IntraVENous Q8H    sodium chloride (NS) flush 5-40 mL  5-40 mL IntraVENous PRN    acetaminophen (TYLENOL) tablet 650 mg  650 mg Oral Q6H PRN    naloxone (NARCAN) injection 0.4 mg  0.4 mg IntraVENous PRN    traMADol (ULTRAM) tablet 50 mg  50 mg Oral Q6H PRN    enoxaparin (LOVENOX) injection 40 mg  40 mg SubCUTAneous Q24H    carboxymethylcellulose sodium (CELLUVISC) 1 % ophthalmic solution 2 Drop  2 Drop Both Eyes PRN    latanoprost (XALATAN) 0.005 % ophthalmic solution 1 Drop  1 Drop Both Eyes QHS    OLANZapine (ZyPREXA) tablet 5 mg  5 mg Oral QHS    spironolactone (ALDACTONE) tablet 50 mg  50 mg Oral DAILY    spironolactone (ALDACTONE) tablet 25 mg  25 mg Oral QHS    cefdinir (OMNICEF) capsule 300 mg  300 mg Oral Q12H         Taiwo Galeas NP

## 2019-08-22 NOTE — PROGRESS NOTES
Bedside and Verbal shift change report given to Elton Cheung RN (oncoming nurse) by Tc Zamorano RN (offgoing nurse). Report included the following information SBAR, Kardex, MAR and Accordion.

## 2019-08-22 NOTE — PROGRESS NOTES
Palomo Suresh yisel [address]  [address] [facility] [place], [address] [place] [address]  [phone]      Medical Progress Note      NAME: Clayton Luo   :  1931  MRM:  [id number]    Date/Time: 2019  10:06 AM       Assessment and Plan:   1.  Rib fractures: Acute right lateral seventh and eighth rib fractures. No pneumothorax or contusion. Traumatic, as a result of GLF. Pain control. Continue spirometry and pulmonary toilet. Add lido patch. PT/OT, fall precautions. Needs SNF     2. Fall: frequent falls over the past few moths. Of note, pt has vision problems and is hard of hearing. PT/OT, fall precautions. CM consult for SNF placement,       3.  Type 2 diabetes with nephropathy: NOT on home meds. doubt this Dx. A1C is 5.8. No BG check.      4. Lower extremity edema: LE dopplers: negative for DVT.      5.  Essential hypertension: She is on an unusual dosing of aldactone. If she has further falls, this should be reduced or discontinued.     6. ABAD (obstructive sleep apnea): CPAP QHS if tolerated     7.  Esophageal stricture/ GERD (gastroesophageal reflux disease) (): can use PPI PRN     8.  Macular degeneration / Glaucoma / Ptosis of eyelid: continue eye drops. Outpatient follow up. Supportive care. Fall precautions. OT eval and treat     9. Depression (): continue home psychotropics     10. Presence of cardiac pacemaker: Can stop cardiac monitor. Pacer interrogated and no abnormal rhythm identified.        Code Status: DNR             Subjective:     Chief Complaint:  Follow up of pt who was admitted with rib fracture. Still has RT side rib pain with movement and deep breathing. ROS:  (bold if positive, if negative)      Tolerating PT  Tolerating Diet        Objective:     Last 24hrs VS reviewed since prior progress note.  Most recent are:    Visit Vitals  /75 (BP 1 Location: Left arm, BP Patient Position: At rest)   Pulse 72   Temp 97.9 °F (36.6 °C)   Resp 16   Ht 4' 11\" (1.499 m)   Wt 77 kg (169 lb 12.1 oz)   SpO2 95%   Breastfeeding?  No   BMI 34.29 kg/m²     SpO2 Readings from Last 6 Encounters:   08/22/19 95%   07/16/19 96%   04/16/19 94%   10/16/18 95%   09/17/18 94%   08/30/18 98%            Intake/Output Summary (Last 24 hours) at 8/22/2019 1457  Last data filed at 8/21/2019 2300  Gross per 24 hour   Intake    Output 250 ml   Net -250 ml        Physical Exam:    Gen:  Well-developed, well-nourished, in no acute distress  HEENT:  Pink conjunctivae, PERRL, hearing intact to voice, moist mucous membranes  Neck:  Supple, without masses, thyroid non-tender  Resp:  No accessory muscle use, clear breath sounds without wheezes rales or rhonchi  Card:  No murmurs, normal S1, S2 without thrills, bruits or peripheral edema  Abd:  Soft, non-tender, non-distended, normoactive bowel sounds are present, no palpable organomegaly and no detectable hernias  Lymph:  No cervical or inguinal adenopathy  Musc:  No cyanosis or clubbing  Skin:  No rashes or ulcers, skin turgor is good  Neuro:  Cranial nerves are grossly intact, no focal motor weakness, follows commands appropriately  Psych:  Good insight, oriented to person, place and time, alert  __________________________________________________________________  Medications Reviewed: (see below)  Medications:     Current Facility-Administered Medications   Medication Dose Route Frequency    lidocaine 4 % patch 1 Patch  1 Patch TransDERmal Q24H    sodium chloride (NS) flush 5-40 mL  5-40 mL IntraVENous Q8H    sodium chloride (NS) flush 5-40 mL  5-40 mL IntraVENous PRN    acetaminophen (TYLENOL) tablet 650 mg  650 mg Oral Q6H PRN    naloxone (NARCAN) injection 0.4 mg  0.4 mg IntraVENous PRN    traMADol (ULTRAM) tablet 50 mg  50 mg Oral Q6H PRN    enoxaparin (LOVENOX) injection 40 mg  40 mg SubCUTAneous Q24H    carboxymethylcellulose sodium (CELLUVISC) 1 % ophthalmic solution 2 Drop  2 Drop Both Eyes PRN    latanoprost (XALATAN) 0.005 % ophthalmic solution 1 Drop  1 Drop Both Eyes QHS    OLANZapine (ZyPREXA) tablet 5 mg  5 mg Oral QHS    spironolactone (ALDACTONE) tablet 50 mg  50 mg Oral DAILY    spironolactone (ALDACTONE) tablet 25 mg  25 mg Oral QHS        Lab Data Reviewed: (see below)  Lab Review:     Recent Labs     08/21/19  0321 08/20/19  1002   WBC 6.7 6.9   HGB 15.7 16.1*   HCT 48.6* 48.4*    173     Recent Labs     08/21/19  0321 08/20/19  1002    142   K 4.2 4.4   * 111*   CO2 21 24   GLU 86 116*   BUN 18 21*   CREA 0.85 0.98   CA 9.0 9.1   MG 2.2  --    PHOS 3.0  --    ALB 3.5 3.6   TBILI 0.9 0.7   SGOT 12* 17   ALT 9* 10*     Lab Results   Component Value Date/Time    Glucose (POC) 166 (H) 05/26/2010 10:16 AM     No results for input(s): PH, PCO2, PO2, HCO3, FIO2 in the last 72 hours. No results for input(s): INR in the last 72 hours. No lab exists for component: INREXT, INREXT  All Micro Results     Procedure Component Value Units Date/Time    CULTURE, URINE [[id number]] Collected:  08/20/19 1341    Order Status:  Completed Specimen:  Urine Updated:  08/22/19 0756     Special Requests: --        NO SPECIAL REQUESTS  Reflexed from [id number]       [place] Count --        >100,000  COLONIES/mL       Culture result:       MIXED UROGENITAL TAHMINA ISOLATED                I have reviewed notes of prior 24hr. Other pertinent lab:       Total time spent with patient: [address] [place] [address] discussed with: Patient, Family, Care Manager, Nursing Staff and >50% of time spent in counseling and coordination of care    Discussed:  Care Plan and D/C Planning    Prophylaxis:  Lovenox    Disposition:  SNF/LTC           ___________________________________________________    Attending Physician: Harmony Dodd DO

## 2019-08-22 NOTE — ROUTINE PROCESS
Interdisciplinary team rounds were held 8/22/2019 with the following team members:Care Management, Nursing, Nutrition, Pharmacy, Physical Therapy and Physician. Plan of care discussed. See clinical pathway and/or care plan for interventions and desired outcomes. Plan: waiting for recs for possible placement.

## 2019-08-22 NOTE — PROGRESS NOTES
8/22/2019   CARE MANAGEMENT NOTE:  CM reviewed EMR for clinical updates. Pt was admitted with dx of rib fractures; pt with increasing falls. Reportedly, pt resides in an in-law suite at the back of her dtr Dalia's home. Jerica Dee is on vacation this week with plan to return on [place], Aug. 25.     Dtr, Reji Cornejo ([phone]) is an alternate family member while Jerica Dee is on vacation. Khushi lives locally however all of her bedrooms are on the second floor.     Transition Plan of Care:  1. SNF (pt would like [facility]). PT/OT evals are complete. A referral was made to [facility] for review. 2.  CM met with pt's dtr Khushi at bedside and she is in agreement with SNF. A list of private duty aides was provided for post rehab care as needed. 3.  Pt will need a three night qualifying hospital stay (discharge Friday if medically stable). 4.  Dtr will provide transportation at discharge.     CM will continue to follow pt for SNF placement.   Grace

## 2019-08-23 VITALS
HEART RATE: 65 BPM | TEMPERATURE: 98.2 F | WEIGHT: 169.75 LBS | BODY MASS INDEX: 34.22 KG/M2 | DIASTOLIC BLOOD PRESSURE: 65 MMHG | OXYGEN SATURATION: 92 % | RESPIRATION RATE: 17 BRPM | SYSTOLIC BLOOD PRESSURE: 138 MMHG | HEIGHT: 59 IN

## 2019-08-23 PROCEDURE — 77030027138 HC INCENT SPIROMETER -A

## 2019-08-23 PROCEDURE — 77030040361 HC SLV COMPR DVT MDII -B

## 2019-08-23 PROCEDURE — 74011000250 HC RX REV CODE- 250: Performed by: INTERNAL MEDICINE

## 2019-08-23 PROCEDURE — 74011250637 HC RX REV CODE- 250/637: Performed by: INTERNAL MEDICINE

## 2019-08-23 RX ORDER — NYSTATIN 100000 [USP'U]/G
POWDER TOPICAL 2 TIMES DAILY
Qty: 1 BOTTLE | Refills: 0 | Status: SHIPPED
Start: 2019-08-23 | End: 2019-08-30

## 2019-08-23 RX ORDER — TRAMADOL HYDROCHLORIDE 50 MG/1
50 TABLET ORAL
Qty: 8 TAB | Refills: 0 | Status: SHIPPED | OUTPATIENT
Start: 2019-08-23 | End: 2019-08-26

## 2019-08-23 RX ORDER — LIDOCAINE 4 G/100G
PATCH TOPICAL
Qty: 7 PATCH | Refills: 0 | Status: SHIPPED
Start: 2019-08-23 | End: 2019-09-20

## 2019-08-23 RX ORDER — NYSTATIN 100000 [USP'U]/G
POWDER TOPICAL 2 TIMES DAILY
Status: DISCONTINUED | OUTPATIENT
Start: 2019-08-23 | End: 2019-08-23 | Stop reason: HOSPADM

## 2019-08-23 RX ADMIN — ACETAMINOPHEN 650 MG: 325 TABLET ORAL at 05:51

## 2019-08-23 RX ADMIN — Medication 10 ML: at 05:49

## 2019-08-23 RX ADMIN — NYSTATIN: 100000 POWDER TOPICAL at 05:48

## 2019-08-23 RX ADMIN — SPIRONOLACTONE 50 MG: 25 TABLET ORAL at 09:18

## 2019-08-23 RX ADMIN — NYSTATIN: 100000 POWDER TOPICAL at 09:19

## 2019-08-23 NOTE — PROGRESS NOTES
8/23/2019   CARE MANAGEMENT NOTE:   CM confirmed skilled bed is available today at [facility]. RN, please call report to [facility] ([phone]). Grace    Transition of Care Plan to SNF/Rehab    SNF/Rehab Transition:  Patient has been accepted to [facility] and meets criteria for admission. Patient will transported by dtr, [address] and expected to leave at 2 p.m. Communication to Patient/Family:  Met with patient and dtr (identified care giver) and they are agreeable to the transition plan. Communication to SNF/Rehab:  Bedside RN, Keara Roth, has been notified to update the transition plan to the facility and call report (phone number [phone]). Discharge information has been updated on the AVS.     Discharge instructions to be fax'd to facility at [facility] # [phone]). SNF/Rehab Transition:  Patient to follow-up with Home Health:  [facility], other none*,none)  PCP/Specialist: Dr. Vero Stevens Management: none    Reviewed and confirmed with facility, [facility] they can manage the patient care needs for the following:     Tracie Chahal with (X) only those applicable:    Medication:  []  Medications will be available at the facility  []  IV Antibiotics N/A  []  Controlled Substance - hard copy to be sent with patient   []  Weekly Labs   Documents:  [x] Hard RX  [] MAR  [] Kardex  [x] AVS  [x]Transfer Summary  []Discharge   Equipment:  []  CPAP/BiPAP  []  Wound Vacuum  []  [place] or Urinary Device  []  PICC/Central Line  []  Nebulizer  []  Ventilator   Treatment:  []Isolation (for MRSA, VRE, etc.)  []Surgical Drain Management  []Tracheostomy Care  []Dressing Changes  []Dialysis with transportation and chair time N/A  []PEG Care  []Oxygen  []Daily Weights for Heart Failure   Dietary:  []Any diet limitations  []Tube Feedings   []Total Parenteral Management (TPN)   Eligible for Medicaid Long Term Services and Supports  Yes:  [] Eligible for medical assistance or will become eligible within 180 days and UAI completed. [] Provider/Patient and/or support system has requested screening. [] UAI copy provided to patient or responsible party, N/A.  [] UAI unavailable at discharge will send once processed to SNF provider. [] UAI unavailable at discharged mailed to patient  No:   [] Private pay and is not financially eligible for Medicaid within the next 180 days. [] Reside out-of-state. [] A residents of a state owned/operated facility that is licensed  by [facility] or [facility]  [] Enrollment in [place] hosp[facility] services  [] [address] [place] [address]  [] Patient /Family declines to have screening completed or provide financial information for screening     Financial Resources:  Medicaid    [] Initiated and application pending   [] Full coverage     Advanced Care Plan:  []Surrogate Decision Maker of Care  []POA  []Communicated Code Status DNR* (DDNR\", \"Full\")    Other  Second Medicare Letter signed and placed into pt's chart. CM verified patient has a qualifying hospital stay for [facility]. Perez Mckee Doing

## 2019-08-23 NOTE — PROGRESS NOTES
Problem: Falls - Risk of  Goal: *Absence of Falls  Description  Document Nancy Payment Fall Risk and appropriate interventions in the flowsheet.   Outcome: Progressing Towards Goal  Note:   Fall Risk Interventions:  Mobility Interventions: Bed/chair exit alarm, Patient to call before getting OOB    Mentation Interventions: Door open when patient unattended, Bed/chair exit alarm    Medication Interventions: Bed/chair exit alarm, Teach patient to arise slowly, Patient to call before getting OOB    Elimination Interventions: Bed/chair exit alarm, Call light in reach, Patient to call for help with toileting needs    History of Falls Interventions: Bed/chair exit alarm, Investigate reason for fall, Door open when patient unattended

## 2019-08-23 NOTE — PROGRESS NOTES
Pharmacist Discharge Medication Reconciliation    Discharge Provider:  Dr. Sharri Benavides       Discharge Medications:      My Medications        START taking these medications        Instructions Each Dose to Equal Morning Noon Evening Bedtime   lidocaine 4 % patch    Your last dose was: Your next dose is:          Apply to affected area over right chest wall                  nystatin powder  Commonly known as:  MYCOSTATIN    Your last dose was: Your next dose is:          Apply  to affected area two (2) times a day for 7 days. traMADol 50 mg tablet  Commonly known as:  ULTRAM    Your last dose was: Your next dose is: Take 1 Tab by mouth every six (6) hours as needed for Pain for up to 3 days. Max Daily Amount: 200 mg.   50 mg                        CHANGE how you take these medications        Instructions Each Dose to Equal Morning Noon Evening Bedtime   ALDACTONE 50 mg tablet  Generic drug:  spironolactone  What changed:  Another medication with the same name was removed. Continue taking this medication, and follow the directions you see here. Your last dose was: Your next dose is: Take 50 mg by mouth daily. 50 mg                        CONTINUE taking these medications        Instructions Each Dose to Equal Morning Noon Evening Bedtime   acetaminophen 500 mg tablet  Commonly known as:  TYLENOL    Your last dose was: Your next dose is: Take 500 mg by mouth every eight (8) hours as needed for Pain. 500 mg                 dexamethasone 0.1 % ophthalmic solution  Commonly known as:  DECADRON    Your last dose was: Your next dose is:          Apply 1-2 drops into each ear PRN                  ergocalciferol 50,000 unit capsule  Commonly known as:  ERGOCALCIFEROL    Your last dose was: Your next dose is: Take 1 Cap by mouth every seven (7) days.    50,000 Units                 OLANZapine 5 mg tablet  Commonly known as: ZyPREXA    Your last dose was: Your next dose is: Take 1 Tab by mouth nightly. 5 mg                 REFRESH OP    Your last dose was: Your next dose is:          Apply 1 Drop to eye as needed (dry eye). 1 Drop                 XALATAN 0.005 % ophthalmic solution  Generic drug:  latanoprost    Your last dose was: Your next dose is:          Administer 1 Drop to both eyes nightly. 1 Drop                           Where to Get Your Medications        Information on where to get these meds will be given to you by the nurse or doctor.     Ask your nurse or doctor about these medications  lidocaine 4 % patch  nystatin powder  traMADol 50 mg tablet       The patient's chart, MAR, and AVS were reviewed by   Maya Marquez, Ronal Rogers,   Contact: [phone]

## 2019-08-23 NOTE — PROGRESS NOTES
Bedside shift change report given to Lashaun Gaston (oncoming nurse) by Zoraida Durbin (offgoing nurse). Report included the following information SBAR, Kardex, ED Summary, Procedure Summary, Intake/Output and MAR.

## 2019-08-23 NOTE — PROGRESS NOTES
Palomo Kerwin [facility] 79  [facility], [address] [place] [address]  [phone]      Medical Progress Note      NAME: Snow Herring   :  1931  MRM:  [id number]    Date/Time: 2019  10:06 AM       Assessment and Plan:   1.  Rib fractures: Acute right lateral seventh and eighth rib fractures. No pneumothorax or contusion. Traumatic, as a result of GLF. Pain control. Continue spirometry and pulmonary toilet. Added lido patch. PT/OT, fall precautions. Needs SNF, awaiting confirmation, ready for DC today     2. Fall: frequent falls over the past few moths. Of note, pt has vision problems and is hard of hearing. PT/OT, fall precautions. CM consult for SNF placement,       3.  Type 2 diabetes with nephropathy: NOT on home meds. doubt this Dx. A1C is 5.8. No BG check.      4. Lower extremity edema: LE dopplers: negative for DVT.      5.  Essential hypertension: She is on an unusual dosing of aldactone. If she has further falls, this should be reduced or discontinued. For now, I will get rid of her evening dose as an evening diuretic in a limited mobility patient increases risk of fall during the night     6. ABAD (obstructive sleep apnea): CPAP QHS if tolerated     7.  Esophageal stricture/ GERD (gastroesophageal reflux disease) (): can use PPI PRN     8.  Macular degeneration / Glaucoma / Ptosis of eyelid: continue eye drops. Outpatient follow up. Supportive care. Fall precautions. OT eval and treat     9. Depression (): continue home psychotropics     10. Presence of cardiac pacemaker: Can stop cardiac monitor. Pacer interrogated and no abnormal rhythm identified.        Code Status: DNR             Subjective:     Chief Complaint:  Follow up of pt who was admitted with rib fracture. Rib pain better with lido patch      ROS:  (bold if positive, if negative)      Tolerating PT  Tolerating Diet        Objective:     Last 24hrs VS reviewed since prior progress note.  Most recent are:    Visit Vitals  /71 (BP 1 Location: Left arm, BP Patient Position: Sitting)   Pulse 66   Temp 97.4 °F (36.3 °C)   Resp 16   Ht 4' 11\" (1.499 m)   Wt 77 kg (169 lb 12.1 oz)   SpO2 94%   Breastfeeding?  No   BMI 34.29 kg/m²     SpO2 Readings from Last 6 Encounters:   08/23/19 94%   07/16/19 96%   04/16/19 94%   10/16/18 95%   09/17/18 94%   08/30/18 98%            Intake/Output Summary (Last 24 hours) at 8/23/2019 0902  Last data filed at 8/23/2019 2794  Gross per 24 hour   Intake 720 ml   Output    Net 720 ml        Physical Exam:    Gen:  Well-developed, well-nourished, in no acute distress  HEENT:  Pink conjunctivae, PERRL, hearing intact to voice, moist mucous membranes  Neck:  Supple, without masses, thyroid non-tender  Resp:  No accessory muscle use, clear breath sounds without wheezes rales or rhonchi  Card:  No murmurs, normal S1, S2 without thrills, bruits or peripheral edema  Abd:  Soft, non-tender, non-distended, normoactive bowel sounds are present, no palpable organomegaly and no detectable hernias  Lymph:  No cervical or inguinal adenopathy  Musc:  No cyanosis or clubbing  Skin:  No rashes or ulcers, skin turgor is good  Neuro:  Cranial nerves are grossly intact, no focal motor weakness, follows commands appropriately  Psych:  Good insight, oriented to person, place and time, alert  __________________________________________________________________  Medications Reviewed: (see below)  Medications:     Current Facility-Administered Medications   Medication Dose Route Frequency    nystatin (MYCOSTATIN) 100,000 unit/gram powder   Topical BID    lidocaine 4 % patch 1 Patch  1 Patch TransDERmal Q24H    sodium chloride (NS) flush 5-40 mL  5-40 mL IntraVENous Q8H    sodium chloride (NS) flush 5-40 mL  5-40 mL IntraVENous PRN    acetaminophen (TYLENOL) tablet 650 mg  650 mg Oral Q6H PRN    naloxone (NARCAN) injection 0.4 mg  0.4 mg IntraVENous PRN    traMADol (ULTRAM) tablet 50 mg  50 mg Oral Q6H PRN    enoxaparin (LOVENOX) injection 40 mg  40 mg SubCUTAneous Q24H    carboxymethylcellulose sodium (CELLUVISC) 1 % ophthalmic solution 2 Drop  2 Drop Both Eyes PRN    latanoprost (XALATAN) 0.005 % ophthalmic solution 1 Drop  1 Drop Both Eyes QHS    OLANZapine (ZyPREXA) tablet 5 mg  5 mg Oral QHS    spironolactone (ALDACTONE) tablet 50 mg  50 mg Oral DAILY    spironolactone (ALDACTONE) tablet 25 mg  25 mg Oral QHS        Lab Data Reviewed: (see below)  Lab Review:     Recent Labs     08/21/19  0321 08/20/19  1002   WBC 6.7 6.9   HGB 15.7 16.1*   HCT 48.6* 48.4*    173     Recent Labs     08/21/19  0321 08/20/19  1002    142   K 4.2 4.4   * 111*   CO2 21 24   GLU 86 116*   BUN 18 21*   CREA 0.85 0.98   CA 9.0 9.1   MG 2.2  --    PHOS 3.0  --    ALB 3.5 3.6   TBILI 0.9 0.7   SGOT 12* 17   ALT 9* 10*     Lab Results   Component Value Date/Time    Glucose (POC) 166 (H) 05/26/2010 10:16 AM     No results for input(s): PH, PCO2, PO2, HCO3, FIO2 in the last 72 hours. No results for input(s): INR in the last 72 hours. No lab exists for component: INREXT, INREXT  All Micro Results     Procedure Component Value Units Date/Time    CULTURE, URINE [[id number]] Collected:  08/20/19 1341    Order Status:  Completed Specimen:  Urine Updated:  08/22/19 0756     Special Requests: --        NO SPECIAL REQUESTS  Reflexed from [id number]       [place] Count --        >100,000  COLONIES/mL       Culture result:       MIXED UROGENITAL TAHMINA ISOLATED                I have reviewed notes of prior 24hr. Other pertinent lab:       Total time spent with patient: [address] discussed with: Patient, Care Manager, Nursing Staff and >50% of time spent in counseling and coordination of care    Discussed:  Care Plan and D/C Planning    Prophylaxis:  Lovenox    Disposition:  SNF/LTC           ___________________________________________________    Attending Physician: Tosin Cox DO

## 2019-08-23 NOTE — PROGRESS NOTES
BELEM Note:   CM verified patient has a qualifying hospital stay for Located within [facility]. Danitza Pickard

## 2019-08-23 NOTE — DISCHARGE INSTRUCTIONS
Patient Discharge Instructions    Luba Rob / [id number] : 1931    Admitted 2019 Discharged: 2019     Primary Diagnoses  Problem List as of 2019 Date Reviewed: 2019          Codes Class Noted - Resolved    Rib fractures ICD-10-CM: S22.39XA  ICD-9-CM: 807.00  2019 - Present        Fall ICD-10-CM: W19. Reed Daily  ICD-9-CM: E888.9  2019 - Present        Lower extremity edema ICD-10-CM: R60.0  ICD-9-CM: 782.3  2019 - Present        Pyuria ICD-10-CM: N39.0  ICD-9-CM: 791.9  2019 - Present        Elevated hemoglobin (HCC) ICD-10-CM: D58.2  ICD-9-CM: 282.7  2019 - Present        Presence of cardiac pacemaker ICD-10-CM: Z95.0  ICD-9-CM: V45.01  2019 - Present        Otalgia of both ears ICD-10-CM: H92.03  ICD-9-CM: 388.70  2019 - Present        Glaucoma ICD-10-CM: H40.9  ICD-9-CM: 365.9  Unknown - Present        Type 2 diabetes with nephropathy ([facility] ICD-10-CM: E11.21  ICD-9-CM: 250.40, 583.81  2018 - Present        Psychophysiological insomnia ICD-10-CM: F51.04  ICD-9-CM: 307.42  2017 - Present        Drug-induced glaucoma ICD-10-CM: H40.60X0, T50.905A  ICD-9-CM: 365.89, 365.70  2017 - Present        Depression ICD-10-CM: F32.9  ICD-9-CM: 402  Unknown - Present        Ptosis of eyelid ICD-10-CM: H02.409  ICD-9-CM: 374.30  2016 - Present        Essential hypertension ICD-10-CM: I10  ICD-9-CM: 401.9  3/11/2016 - Present        Pseudophakia ICD-10-CM: Z96.1  ICD-9-CM: V43.1  2016 - Present        GERD (gastroesophageal reflux disease) ICD-10-CM: K21.9  ICD-9-CM: 530.81  Unknown - Present        Esophageal stricture ICD-10-CM: K22.2  ICD-9-CM: 530.3  Unknown - Present        DJD (degenerative joint disease), lumbar ICD-10-CM: M47.816  ICD-9-CM: 721.3  Unknown - Present        Macular degeneration ICD-10-CM: H35.30  ICD-9-CM: 362.50  Unknown - Present    Overview Addendum 5/15/2012  2:03 PM by Shannan Kirby     macular degeneration vs optic neuropathy             ABAD (obstructive sleep apnea) ICD-10-CM: G47.33  ICD-9-CM: 327.23  8/14/2011 - Present        RESOLVED: HTN (hypertension) ICD-10-CM: I10  ICD-9-CM: 401.9  Unknown - 8/29/2018        RESOLVED: Confusion ICD-10-CM: R41.0  ICD-9-CM: 298.9  8/28/2018 - 8/30/2018        RESOLVED: Type 2 diabetes mellitus with proliferative retinopathy ([facility] ICD-10-CM: Z20.3082  ICD-9-CM: 250.50, 362.02  5/30/2018 - 5/30/2018        RESOLVED: Advanced care planning/counseling discussion ICD-10-CM: L81.95  ICD-9-CM: V65.49  5/30/2018 - 8/29/2018    Overview Signed 5/30/2018 11:06 AM by Doris Sharma RN     Patient states that a completed Advanced Medical Directive is at home. NN encouraged patient to bring a copy of the completed Advanced Medical Directive to the office for scanning into the medical record. Patient verbalized understanding & agreement.                RESOLVED: Type 2 diabetes mellitus with proliferative retinopathy ([facility] ICD-10-CM: Z67.9915  ICD-9-CM: 250.50, 362.02  4/11/2018 - 5/30/2018        RESOLVED: Type 2 diabetes mellitus with nephropathy ([facility] ICD-10-CM: E11.21  ICD-9-CM: 250.40, 583.81  1/30/2018 - 5/30/2018        RESOLVED: Type 2 diabetes mellitus with diabetic retinopathy ([facility] ICD-10-CM: E11.319  ICD-9-CM: 250.50, 362.01  1/30/2018 - 5/30/2018        RESOLVED: Hyperkalemia ICD-10-CM: E87.5  ICD-9-CM: 276.7  1/30/2018 - 5/30/2018        RESOLVED: S/P laparoscopic cholecystectomy ICD-10-CM: Z90.49  ICD-9-CM: V45.89  8/1/2017 - 8/29/2018        RESOLVED: Gallstones ICD-10-CM: K80.20  ICD-9-CM: 574.20  7/31/2017 - 8/29/2018        RESOLVED: Diabetes mellitus without complication ([facility] LTV-54-TB: E11.9  ICD-9-CM: 250.00  3/11/2016 - 5/30/2018        RESOLVED: Psychotic depression ([facility] ICD-10-CM: F32.3  ICD-9-CM: 296.20  12/21/2015 - 8/29/2018        RESOLVED: Chronic cough ICD-10-CM: R05  ICD-9-CM: 786.2  9/14/2015 - 8/29/2018        RESOLVED: DM (diabetes mellitus) ([facility] ICD-10-CM: E11.9  ICD-9-CM: 250.00  9/13/2015 - 3/11/2016    Overview Signed 9/13/2015  6:24 AM by Qi Velasquez     2015 A1C=7.0. RESOLVED: Diastolic dysfunction YVO-19-WI: I51.89  ICD-9-CM: 429.9  12/19/2012 - 8/29/2018    Overview Signed 12/19/2012 11:17 AM by Qi Velasquez     2012 ECHO mild. RESOLVED: Osteopenia ICD-10-CM: M85.80  ICD-9-CM: 733.90  Unknown - 8/29/2018        RESOLVED: Iron deficiency anemia ICD-10-CM: D50.9  ICD-9-CM: 280.9  5/8/2012 - 8/29/2018        RESOLVED: Sinus node dysfunction ([facility] ICD-10-CM: I49.5  ICD-9-CM: 427.81  11/14/2011 - 8/29/2018    Overview Signed 11/14/2011  1:49 PM by Cruzito Pacheco LPN     80/0/[id number] Rochelle ZAMORA.             RESOLVED: Venous insufficiency ICD-10-CM: T63.0  ICD-9-CM: 459.81  10/10/2011 - 8/29/2018        RESOLVED: HTN (hypertension) ICD-10-CM: I10  ICD-9-CM: 401.9  8/14/2011 - 3/11/2016    Overview Signed 9/20/2011  9:55 AM by Cruzito Pacheco LPN     0/89/[id number] 55-60%. Tech difficult study with poor acoustic window. Left atrium mildly dilated. RESOLVED: Palpitation ICD-10-CM: R00.2  ICD-9-CM: 785.1  8/14/2011 - 8/29/2018    Overview Signed 10/7/2011 11:23 AM by Cruzito Pacheco LPN     75/4/[id number] 72%. With no ischemia. Take Home Medications       · It is important that you take the medication exactly as they are prescribed. · Keep your medication in the bottles provided by the pharmacist and keep a list of the medication names, dosages, and times to be taken in your wallet. · Do not take other medications without consulting your doctor. What to do at Home    Recommended diet: Resume previous diet    Recommended activity: Activity as tolerated    If you experience worsening symptoms, please follow up with nearest ER.     Follow-up with your PCP in 1 week        Information obtained by :  I understand that if any problems occur once I am at home I am to contact my physician. I understand and acknowledge receipt of the instructions indicated above.                                                                                                                                            Physician's or R.N.'s Signature                                                                  Date/Time                                                                                                                                              Patient or Representative Signature                                                          Date/Time

## 2019-08-23 NOTE — PROGRESS NOTES
Bedside and Verbal shift change report given to Claudean Money, RN (oncoming nurse) by Victoria Richards RN (offgoing nurse). Report included the following information SBAR, Kardex, MAR and Accordion.

## 2019-08-23 NOTE — DISCHARGE SUMMARY
Physician Discharge Summary     Patient ID:  Inderjit Xiao  [id number]  61 y.o.  7/20/1931    Admit date: 8/20/2019    Discharge date and time: 8/23/2019    Admission Diagnoses: Rib fractures [S22.39XA]    Discharge Diagnoses:    Principal Diagnosis   <principal problem not specified>                                             Other Diagnoses  Active Problems:    ABAD (obstructive sleep apnea) (8/14/2011)      GERD (gastroesophageal reflux disease) ()      Esophageal stricture ()      Macular degeneration ()      Overview: macular degeneration vs optic neuropathy      Essential hypertension (3/11/2016)      Depression ()      Type 2 diabetes with nephropathy ([facility] (5/30/2018)      Glaucoma ()      Presence of cardiac pacemaker (4/16/2019)      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 Rib fractures: Acute right lateral seventh and eighth rib fractures. No pneumothorax or contusion. Traumatic, as a result of GLF. Pain control. Continue spirometry and pulmonary toilet. Added lido patch. PT/OT, fall precautions. Needs SNF, awaiting confirmation, ready for DC today     2. Fall: frequent falls over the past few moths. Of note, pt has vision problems and is hard of hearing. PT/OT, fall precautions. CM consult for SNF placement,       3.  Type 2 diabetes with nephropathy: NOT on home meds. doubt this Dx. A1C is 5.8. No BG check.      4.  Lower extremity edema: LE dopplers: negative for DVT.      5.  Essential hypertension: She is on an unusual dosing of aldactone. If she has further falls, this should be reduced or discontinued. For now, I will get rid of her evening dose as an evening diuretic in a limited mobility patient increases risk of fall during the night     6.   ABAD (obstructive sleep apnea): CPAP QHS if tolerated     7.  Esophageal stricture/ GERD (gastroesophageal reflux disease) (): can use PPI PRN     8.  Macular degeneration / Glaucoma / Ptosis of eyelid: continue eye drops. Outpatient follow up. Supportive care. Fall precautions. OT eval and treat     9.  Depression (): continue home psychotropics     10.  Presence of cardiac pacemaker: Can stop cardiac monitor. Pacer interrogated and no abnormal rhythm identified.       PCP: Faby Godfrey MD    Consults: Cardiology    Significant Diagnostic Studies: See Hospital Course    Discharged home in improved condition. Discharge Exam:    Visit Vitals  /71 (BP 1 Location: Left arm, BP Patient Position: Sitting)   Pulse 66   Temp 97.4 °F (36.3 °C)   Resp 16   Ht 4' 11\" (1.499 m)   Wt 77 kg (169 lb 12.1 oz)   SpO2 94%   Breastfeeding? No   BMI 34.29 kg/m²        Physical Exam:     Gen:  Well-developed, well-nourished, in no acute distress  HEENT:  Pink conjunctivae, PERRL, hearing intact to voice, moist mucous membranes  Neck:  Supple, without masses, thyroid non-tender  Resp:  No accessory muscle use, clear breath sounds without wheezes rales or rhonchi  Card:  No murmurs, normal S1, S2 without thrills, bruits or peripheral edema  Abd:  Soft, non-tender, non-distended, normoactive bowel sounds are present, no palpable organomegaly and no detectable hernias  Lymph:  No cervical or inguinal adenopathy  Musc:  No cyanosis or clubbing  Skin:  No rashes or ulcers, skin turgor is good  Neuro:  Cranial nerves are grossly intact, no focal motor weakness, follows commands appropriately  Psych:  Fair insight, oriented to person, place and time, alert      Disposition: SNF    Patient Instructions:   Current Discharge Medication List      START taking these medications    Details   lidocaine 4 % patch Apply to affected area over right chest wall  Qty: 7 Patch, Refills: 0      nystatin (MYCOSTATIN) powder Apply  to affected area two (2) times a day for 7 days.   Qty: 1 Bottle, Refills: 0 traMADol (ULTRAM) 50 mg tablet Take 1 Tab by mouth every six (6) hours as needed for Pain for up to 3 days. Max Daily Amount: 200 mg. Qty: 8 Tab, Refills: 0    Associated Diagnoses: Closed fracture of multiple ribs of right side, initial encounter         CONTINUE these medications which have NOT CHANGED    Details   spironolactone (ALDACTONE) 50 mg tablet Take 50 mg by mouth daily. ergocalciferol (ERGOCALCIFEROL) 50,000 unit capsule Take 1 Cap by mouth every seven (7) days. Qty: 12 Cap, Refills: 3    Associated Diagnoses: Vitamin D deficiency      OLANZapine (ZYPREXA) 5 mg tablet Take 1 Tab by mouth nightly. Qty: 90 Tab, Refills: 1    Associated Diagnoses: Psychophysiological insomnia; Depression, unspecified depression type      dexamethasone (DECADRON) 0.1 % ophthalmic solution Apply 1-2 drops into each ear PRN  Qty: 5 mL, Refills: 1    Associated Diagnoses: Otalgia of both ears      acetaminophen (TYLENOL) 500 mg tablet Take 500 mg by mouth every eight (8) hours as needed for Pain. CARBOXYMETHYLCELLULOSE SODIUM (REFRESH OP) Apply 1 Drop to eye as needed (dry eye). latanoprost (XALATAN) 0.005 % ophthalmic solution Administer 1 Drop to both eyes nightly.            Activity: Activity as tolerated  Diet: Resume previous diet  Wound Care: None needed    Follow-up with PCP in 1-2 weeks    Signed:  Solis Boudreaux DO  8/23/2019  10:39 AM    Greater than 30 mins was spent in coordination, counseling, and execution of this patient's discharge

## 2019-08-26 ENCOUNTER — PATIENT OUTREACH (OUTPATIENT)
Dept: INTERNAL MEDICINE CLINIC | Age: 84
End: 2019-08-26

## 2019-08-29 ENCOUNTER — PATIENT OUTREACH (OUTPATIENT)
Dept: CASE MANAGEMENT | Age: 84
End: 2019-08-29

## 2019-08-29 NOTE — PROGRESS NOTES
Community Care Team documentation for patient in [facility]  Initial Follow Up       Patient was discharged to [address] [facility]. Information included in this progress note has been provided to SNF. Hospital Admission and Diagnosis: [facility] 8/20 - 8/23 Rib fractures ; ABAD (obstructive sleep apnea)   RRAT Score: 29      Advance Care Planning: Not on file      PCP : Hannah Flores MD  Ambulatory  or Care Transition Nurse: Nadine Elizondo  Note routed to [facility] or [facility]. SNF Attending: Anita Peters MD    Spoke with SNF team.  Provided needed hospital follow up appointments: Cardiology Willie Barnett MD outpatient followup. SNF Medical update:   PT/OT update: Currently supervision with bed mobility and transfers. Ambulating 400 ft with rollator. Supervision with all ADLs. Home visit scheduled for 9/3. LOS/ Disposition: Tentative last treat day 9/12. Plan for discharge to home with daughter to in law suite in daughter's home. PCP follow up TBD. Community Care Team will follow up weekly with [facility] until discharge. Medications were not reconciled and general patient assessment was not completed during this [facility] outreach.       Dana Tripp, MSN, RN, ACNS-BC, [facility]  Nurse Navigator, [employer] [phone]

## 2019-09-05 ENCOUNTER — PATIENT OUTREACH (OUTPATIENT)
Dept: CASE MANAGEMENT | Age: 84
End: 2019-09-05

## 2019-09-05 NOTE — PROGRESS NOTES
Community Care Team Documentation for Patient in [facility]  Subsequent Follow up     Patient remains at [address] of [facility] ([facility] See previous [facility] Team notes. Spoke with SNF team. Provided needed hospital follow up appointments: Cardiology Miquel Hernandez MD outpatient follow up. PT/OT update: Currently ambulating 275ft with RW and supervision for safety. Stand by assist to supervision overall with ADLs. Working on standing balance in parallel bars. Home visit completed 9/4, family educated on patients current level of function. During home visit, therapy recommended clearing cluttered pathways and purchasing a transfer bench for shower. LOS/ Disposition:  Plan to discharge 9/16 home with daughter to in law suite and [address]. Medications were not reconciled and general patient assessment was not completed during this skilled nursing facility outreach.

## 2019-09-12 ENCOUNTER — PATIENT OUTREACH (OUTPATIENT)
Dept: CASE MANAGEMENT | Age: 84
End: 2019-09-12

## 2019-09-12 NOTE — PROGRESS NOTES
Community Care Team Documentation for Patient in [facility]  Subsequent Follow up     Patient remains at [address] of [facility] ([facility]). See previous [facility] Team notes. Spoke with SNF team. Provided needed hospital follow up appointments: Cardiology Charlie Rodriguez MD outpatient follow up. PT/OT update: Currently ambulating 1.000ft with RW. Modified independent with lower body ADLs. Stand by assist to supervision with toileting. Last therapy treat day 9/13. LOS/ Disposition: Plan to discharge 9/16 home with daughter to in law suite and [facility]. PCP follow up 9/18 at 11:30 AM.    Medications were not reconciled and general patient assessment was not completed during this skilled nursing facility outreach.

## 2019-09-16 ENCOUNTER — TELEPHONE (OUTPATIENT)
Dept: INTERNAL MEDICINE CLINIC | Age: 84
End: 2019-09-16

## 2019-09-16 NOTE — TELEPHONE ENCOUNTER
Pt is being discharged from [facility] today and Nishi Gallegos is requesting nursing PT and OT. Please call to confirm. Thanks.

## 2019-09-17 NOTE — TELEPHONE ENCOUNTER
Contacted Pt's daughter and scheduled ANDERS for Friday, 9/20/19. Will provide PT and OT orders at that time.

## 2019-09-19 ENCOUNTER — TELEPHONE (OUTPATIENT)
Dept: INTERNAL MEDICINE CLINIC | Age: 84
End: 2019-09-19

## 2019-09-19 ENCOUNTER — PATIENT OUTREACH (OUTPATIENT)
Dept: CASE MANAGEMENT | Age: 84
End: 2019-09-19

## 2019-09-19 NOTE — TELEPHONE ENCOUNTER
Shanita Ramirez, [facility] nurse R/C to office reporting discrepancy in Pt's dx. Shanita Ramirez is asking PCP to confirm dx DM and Type 2 diabetes with nephropathy. Pt was not taking spirolactone while at the [facility] as she \"did not need it\". Pt was discharged on Monday and wanted to resume spirolactone on Tuesday. Shanita Ramirez asked Pt to hold spirolactone until she hears from PCP and did not take it yesterday. Shanita Ramirez reports /62 today and would like to know if Pt should resume spirolactone. Advised Shanita Ramirez Pt has a ANDERS appt scheduled for tomorrow and continue holding spirolactone until then. Please advise if Pt should resume spirolactone prior to appt tomorrow.

## 2019-09-19 NOTE — PROGRESS NOTES
Community Care Team Documentation for Patient in [facility]  Discharge Note    Patient has been discharged from [address] [facility] ([facility]). See previous [facility] Team notes. PCP : Ozzy Juarez MD    Spoke with SNF team. Confirmed patient discharged 9/16 home with daughter to in law suite and At [facility]. PCP follow up 9/18 at 11:30 AM.    [facility] will sign off at this time. Medications were not reconciled and general patient assessment was not completed during this skilled nursing facility outreach.

## 2019-09-19 NOTE — Clinical Note
Confirmed patient discharged 9/16 home with daughter to in law suite and At [facility].

## 2019-09-19 NOTE — TELEPHONE ENCOUNTER
Sophia Galeano is requesting to speak with a nurse regarding pt being discharged from The [address] and b/p med problem (to stay the same or change medication). Thanks.

## 2019-09-19 NOTE — TELEPHONE ENCOUNTER
LVM advising Franchesca Francisco Pt has an appt with Dr Erlinda Lema tomorrow, medication will be discussed at that time.

## 2019-09-20 ENCOUNTER — OFFICE VISIT (OUTPATIENT)
Dept: INTERNAL MEDICINE CLINIC | Age: 84
End: 2019-09-20

## 2019-09-20 VITALS
RESPIRATION RATE: 14 BRPM | BODY MASS INDEX: 34.88 KG/M2 | HEART RATE: 64 BPM | DIASTOLIC BLOOD PRESSURE: 68 MMHG | TEMPERATURE: 98.4 F | HEIGHT: 59 IN | SYSTOLIC BLOOD PRESSURE: 138 MMHG | WEIGHT: 173 LBS

## 2019-09-20 DIAGNOSIS — S22.49XS CLOSED FRACTURE OF MULTIPLE RIBS, UNSPECIFIED LATERALITY, SEQUELA: ICD-10-CM

## 2019-09-20 DIAGNOSIS — F51.04 PSYCHOPHYSIOLOGICAL INSOMNIA: ICD-10-CM

## 2019-09-20 DIAGNOSIS — F32.A DEPRESSION, UNSPECIFIED DEPRESSION TYPE: ICD-10-CM

## 2019-09-20 DIAGNOSIS — R35.0 URINARY FREQUENCY: ICD-10-CM
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
## 2019-09-20 DIAGNOSIS — Z09 HOSPITAL DISCHARGE FOLLOW-UP: Primary | ICD-10-CM

## 2019-09-20 DIAGNOSIS — F22 PARANOIA (PSYCHOSIS) (HCC): ICD-10-CM

## 2019-09-20 DIAGNOSIS — I10 ESSENTIAL HYPERTENSION: ICD-10-CM

## 2019-09-20 PROBLEM — D58.2 ELEVATED HEMOGLOBIN (HCC): Status: RESOLVED | Noted: 2019-07-17 | Resolved: 2019-09-20

## 2019-09-20 PROBLEM — E11.21 TYPE 2 DIABETES WITH NEPHROPATHY (HCC): Status: RESOLVED | Noted: 2018-05-30 | Resolved: 2019-09-20

## 2019-09-20 RX ORDER — OLANZAPINE 5 MG/1
5 TABLET ORAL
Qty: 90 TAB | Refills: 1 | Status: SHIPPED | OUTPATIENT
Start: 2019-09-20 | End: 2020-01-28

## 2019-09-20 RX ORDER — LIDOCAINE HCL 4 G/100G
CREAM TOPICAL
COMMUNITY
End: 2019-10-28

## 2019-09-20 NOTE — PROGRESS NOTES
Imer Nolen is a 80 y.o. female who presents today for Transitions Of Care  . 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 Earnstine Severe    Lower extremity edema R60.0    Pyuria N39.0   . Today patient is here for ANDERS. Hospitalization: Patient was hospitalized at [facility] from August 20 of the 23rd following a ground-level fall and fractures of 2 ribs. Our nurse navigator has been in touch with patient during hospitalization has facilitated this follow-up. She was then skilled sent to a skilled nursing facility where she remained until the 16th of this month. Patient notes that overall her strength has slowly improved. She is now home with home health, Coming today. She notes that overall her pain is well controlled. She is using a walker to ambulate. Daughter lives next door. Hypertension -at discharge from the hospital her spironolactone was decreased to 50 mg once daily and this is been held since being in the skilled nursing facility. Currently she is not taking this. reports that she has never smoked. She has never used smokeless tobacco.    reports that she does not drink alcohol. BP Readings from Last 2 Encounters:   09/20/19 138/68   08/23/19 138/65     Paranoia/Insomnia: Patient continue Zyprexa at current dose. Patient notes that since being home she is sleeping better. Denies any episodes of paranoia.     Patient notes having a bit more urinary frequency. Denies any dysuria. Denies any fevers or chills. ROS  Review of Systems   Constitutional: Negative for chills, fever and weight loss. HENT: Negative for congestion and sore throat. Eyes: Negative for blurred vision, double vision and photophobia. Respiratory: Negative for cough and shortness of breath. Cardiovascular: Negative for chest pain, palpitations and leg swelling. Gastrointestinal: Positive for abdominal pain (Rib pain). Negative for constipation, diarrhea, heartburn, nausea and vomiting. Genitourinary: Negative for dysuria, frequency and urgency. Musculoskeletal: Negative for back pain, joint pain, myalgias and neck pain. Skin: Negative for rash. Neurological: Negative. Negative for headaches. Endo/Heme/Allergies: Does not bruise/bleed easily. Psychiatric/Behavioral: Positive for depression. Negative for memory loss and suicidal ideas. Visit Vitals  /68 (BP 1 Location: Left arm, BP Patient Position: Sitting)   Pulse 64   Temp 98.4 °F (36.9 °C) (Oral)   Resp 14   Ht 4' 11\" (1.499 m)   Wt 173 lb (78.5 kg)   BMI 34.94 kg/m²       Physical Exam   Constitutional: She is oriented to person, place, and time. She appears well-developed and well-nourished. HENT:   Head: Normocephalic and atraumatic. Cardiovascular: Normal rate and regular rhythm. No murmur heard. Pulmonary/Chest: Effort normal. No respiratory distress. Abdominal: Soft. Bowel sounds are normal.       Pain in general area were noted. No CVA tenderness   Neurological: She is alert and oriented to person, place, and time. Skin: Skin is warm and dry. Psychiatric: She has a normal mood and affect. Her behavior is normal.         Current Outpatient Medications   Medication Sig    lidocaine (ASPERCREME, LIDOCAINE,) 4 % topical cream Apply  to affected area.  OLANZapine (ZYPREXA) 5 mg tablet Take 1 Tab by mouth nightly.     ergocalciferol (ERGOCALCIFEROL) 50,000 unit capsule Take 1 Cap by mouth every seven (7) days.  dexamethasone (DECADRON) 0.1 % ophthalmic solution Apply 1-2 drops into each ear PRN    acetaminophen (TYLENOL) 500 mg tablet Take 500 mg by mouth every eight (8) hours as needed for Pain.  CARBOXYMETHYLCELLULOSE SODIUM (REFRESH OP) Apply 1 Drop to eye as needed (dry eye).  latanoprost (XALATAN) 0.005 % ophthalmic solution Administer 1 Drop to both eyes nightly. No current facility-administered medications for this visit. Past Medical History:   Diagnosis Date    Depression     \"better since moving into my daughters mother-in-law suite\"    DJD (degenerative joint disease), lumbar     GERD (gastroesophageal reflux disease)     stricture.  Glaucoma     Kashia (hard of hearing)     HTN (hypertension)     Keratoconjunctivitis     sicca    Macular degeneration     macular degeneration vs ooptic neuropathy    ABAD (obstructive sleep apnea)     decided not to use d/t frequent bathroom trips at night.  didn't help    Osteopenia     Venous insufficiency 10/10/2011      Past Surgical History:   Procedure Laterality Date    BREAST SURGERY PROCEDURE UNLISTED  30 yrs ago    Breast Lumpectomy (side?)    EGD  2004    HX CHOLECYSTECTOMY  07/2018    HX COLONOSCOPY  2004    HX ENDOSCOPY  2004    HX HYSTERECTOMY      HX ORTHOPAEDIC  2005    cyst/tumor left hip    HX ORTHOPAEDIC  2014    Right Total Hip    Dr. Loretta Jackson J-W    HX PACEMAKER  11/04/2011    [facility], Dr Andrew Hagen OP    HX PACEMAKER      STRESS TEST LEXISCAN/CARDIOLITE  10/3/11    normal perfusion, EF 72%      Social History     Tobacco Use    Smoking status: Never Smoker    Smokeless tobacco: Never Used   Substance Use Topics    Alcohol use: No      Family History   Problem Relation Age of Onset    Heart Failure Mother     Psychiatric Disorder Mother     Heart Disease Father     Stroke Brother     Psychiatric Disorder Other         aunt        Allergies   Allergen Reactions    Meperidine Hives     Other reaction(s): Hives    Ace Inhibitors Unknown (comments)    Aldactone [Spironolactone] Other (comments)     hyperkalemia    Codeine Unknown (comments)    Miralax [Polyethylene Glycol 3350] Nausea and Vomiting    Other Plant, Animal, Environmental Rash     Bilateral Hearing Aids cause severe reaction per pt report    Percocet [Oxycodone-Acetaminophen] Unknown (comments)    Statins-Hmg-Coa Reductase Inhibitors Unknown (comments)    Sular [Nisoldipine] Rash    Zetia [Ezetimibe] Unknown (comments)        Assessment/Plan  Diagnoses and all orders for this visit:    1. Hospital discharge follow-up -overall patient is doing well. She has a good bit of support at home. Home health will be coming starting today. I urged patient to continue ambulating with a walker. She notes that she is somewhat frustrated due to having to use a walker but understands why she needs to do this. 2. Paranoia (psychosis) ([address] -can use Zyprexa. 3. Essential hypertension -pressure stable without diuretic. Continue to monitor at home. 4. Closed fracture of multiple ribs, unspecified laterality, sequela -notes the pain overall is pretty well controlled. We discussed using thousand milligrams of Tylenol at bedtime to see if this does not help with her pain at night    5. Fall, sequela    6. Psychophysiological insomnia  -     OLANZapine (ZYPREXA) 5 mg tablet; Take 1 Tab by mouth nightly. 7. Depression, unspecified depression type  -     OLANZapine (ZYPREXA) 5 mg tablet; Take 1 Tab by mouth nightly. 8.  Urinary frequency: Patient notes a bit of increased urinary frequency. We discussed that she is off Aldactone at this time. We discussed scheduling bathroom breaks. Denies any dysuria or other systemic symptoms. Patient will keep her follow-up with me in October.         Jessica Buck MD  9/20/2019    This note was created with the help of speech recognition software Vega) and may contain some 'sound alike' errors.

## 2019-10-17 ENCOUNTER — TELEPHONE (OUTPATIENT)
Dept: INTERNAL MEDICINE CLINIC | Age: 84
End: 2019-10-17

## 2019-10-28 ENCOUNTER — OFFICE VISIT (OUTPATIENT)
Dept: INTERNAL MEDICINE CLINIC | Age: 84
End: 2019-10-28

## 2019-10-28 VITALS
SYSTOLIC BLOOD PRESSURE: 132 MMHG | WEIGHT: 168 LBS | BODY MASS INDEX: 33.87 KG/M2 | HEIGHT: 59 IN | RESPIRATION RATE: 18 BRPM | DIASTOLIC BLOOD PRESSURE: 78 MMHG | OXYGEN SATURATION: 97 % | HEART RATE: 78 BPM | TEMPERATURE: 98.1 F

## 2019-10-28 DIAGNOSIS — S22.49XS CLOSED FRACTURE OF MULTIPLE RIBS, UNSPECIFIED LATERALITY, SEQUELA: ICD-10-CM

## 2019-10-28 DIAGNOSIS — I10 ESSENTIAL HYPERTENSION: ICD-10-CM

## 2019-10-28 DIAGNOSIS — Z00.00 MEDICARE ANNUAL WELLNESS VISIT, SUBSEQUENT: Primary | ICD-10-CM

## 2019-10-28 DIAGNOSIS — F51.04 PSYCHOPHYSIOLOGICAL INSOMNIA: ICD-10-CM

## 2019-10-28 DIAGNOSIS — Z23 ENCOUNTER FOR IMMUNIZATION: ICD-10-CM

## 2019-10-28 DIAGNOSIS — Z13.31 SCREENING FOR DEPRESSION: ICD-10-CM

## 2019-10-28 DIAGNOSIS — Z71.89 ADVANCED DIRECTIVES, COUNSELING/DISCUSSION: ICD-10-CM

## 2019-10-28 DIAGNOSIS — F32.A DEPRESSION, UNSPECIFIED DEPRESSION TYPE: ICD-10-CM

## 2019-10-28 NOTE — PROGRESS NOTES
Pt is fasting this morning. Tdap: No tdap on file and pt does not remember last tdap vaccine. Flu vaccine: Pt would like flu shot today. Shingles vaccine: Pt has been educated on new shingrix and where to obtain inj    Pneumonia vaccines: Pt had prevnar 15 in 2016 (age 80), and unspecified pneu shot in 2001 (age 79).

## 2019-10-28 NOTE — PATIENT INSTRUCTIONS
Medicare Wellness Visit, Female The best way to live healthy is to have a lifestyle where you eat a well-balanced diet, exercise regularly, limit alcohol use, and quit all forms of tobacco/nicotine, if applicable. Regular preventive services are another way to keep healthy. Preventive services (vaccines, screening tests, monitoring & exams) can help personalize your care plan, which helps you manage your own care. Screening tests can find health problems at the earliest stages, when they are easiest to treat. [facility] follows the current, evidence-based guidelines published by the [facility] ([facility]) when recommending preventive services for our patients. Because we follow these guidelines, sometimes recommendations change over time as research supports it. (For example, mammograms used to be recommended annually. Even though Medicare will still pay for an annual mammogram, the newer guidelines recommend a mammogram every two years for women of average risk.) Of course, you and your doctor may decide to screen more often for some diseases, based on your risk and your health status. Preventive services for you include: - Medicare offers their members a free annual wellness visit, which is time for you and your primary care provider to discuss and plan for your preventive service needs. Take advantage of this benefit every year! 
-All adults over the age of 72 should receive the recommended pneumonia vaccines. Current USPSTF guidelines recommend a series of two vaccines for the best pneumonia protection.  
-All adults should have a flu vaccine yearly and a tetanus vaccine every 10 years. All adults age 61 and older should receive a shingles vaccine once in their lifetime.   
-A bone mass density test is recommended when a woman turns 65 to screen for osteoporosis. This test is only recommended one time, as a screening. Some providers will use this same test as a disease monitoring tool if you already have osteoporosis. -All adults age 38-68 who are overweight should have a diabetes screening test once every three years.  
-Other screening tests and preventive services for persons with diabetes include: an eye exam to screen for diabetic retinopathy, a kidney function test, a foot exam, and stricter control over your cholesterol.  
-Cardiovascular screening for adults with routine risk involves an electrocardiogram (ECG) at intervals determined by your doctor.  
-Colorectal cancer screenings should be done for adults age 54-65 with no increased risk factors for colorectal cancer. There are a number of acceptable methods of screening for this type of cancer. Each test has its own benefits and drawbacks. Discuss with your doctor what is most appropriate for you during your annual wellness visit. The different tests include: colonoscopy (considered the best screening method), a fecal occult blood test, a fecal DNA test, and sigmoidoscopy. -Breast cancer screenings are recommended every other year for women of normal risk, age 54-69. 
-Cervical cancer screenings for women over age 72 are only recommended with certain risk factors.  
-All adults born between [facility] should be screened once for Hepatitis C. Here is a list of your current Health Maintenance items (your personalized list of preventive services) with a due date: 
Health Maintenance Due Topic Date Due  
 DTaP/Tdap/Td  (1 - Tdap) [birth year]/1952  Shingles Vaccine (1 of 2) 07/20/1981  Pneumococcal Vaccine (2 of 2 - PPSV23) 10/27/2017 Sugar Hoffmann Annual Well Visit  05/31/2019  Glaucoma Screening   12/15/2019 Body Mass Index: Care Instructions Your Care Instructions Body mass index (BMI) can help you see if your weight is raising your risk for health problems. It uses a formula to compare how much you weigh with how tall you are. · A BMI lower than 18.5 is considered underweight. · A BMI between 18.5 and 24.9 is considered healthy. · A BMI between 25 and 29.9 is considered overweight. A BMI of 30 or higher is considered obese. If your BMI is in the normal range, it means that you have a lower risk for weight-related health problems. If your BMI is in the overweight or obese range, you may be at increased risk for weight-related health problems, such as high blood pressure, heart disease, stroke, arthritis or joint pain, and diabetes. If your BMI is in the underweight range, you may be at increased risk for health problems such as fatigue, lower protection (immunity) against illness, muscle loss, bone loss, hair loss, and hormone problems. BMI is just one measure of your risk for weight-related health problems. You may be at higher risk for health problems if you are not active, you eat an unhealthy diet, or you drink too much alcohol or use tobacco products. Follow-up care is a key part of your treatment and safety. Be sure to make and go to all appointments, and call your doctor if you are having problems. It's also a good idea to know your test results and keep a list of the medicines you take. How can you care for yourself at home? · Practice healthy eating habits. This includes eating plenty of fruits, vegetables, whole grains, lean protein, and low-fat dairy. · If your doctor recommends it, get more exercise. Walking is a good choice. Bit by bit, increase the amount you walk every day. Try for at least 30 minutes on most days of the week. · Do not smoke. Smoking can increase your risk for health problems. If you need help quitting, talk to your doctor about stop-smoking programs and medicines. These can increase your chances of quitting for good. · Limit alcohol to 2 drinks a day for men and 1 drink a day for women. Too much alcohol can cause health problems. If you have a BMI higher than 25 · Your doctor may do other tests to check your risk for weight-related health problems. This may include measuring the distance around your waist. A waist measurement of more than 40 inches in men or 35 inches in women can increase the risk of weight-related health problems. · Talk with your doctor about steps you can take to stay healthy or improve your health. You may need to make lifestyle changes to lose weight and stay healthy, such as changing your diet and getting regular exercise. If you have a BMI lower than 18.5 · Your doctor may do other tests to check your risk for health problems. · Talk with your doctor about steps you can take to stay healthy or improve your health. You may need to make lifestyle changes to gain or maintain weight and stay healthy, such as getting more healthy foods in your diet and doing exercises to build muscle. Where can you learn more? Go to http://lisbet-navya.info/. Enter S176 in the search box to learn more about \"Body Mass Index: Care Instructions. \" Current as of: October 13, 2016 Content Version: 11.4 © 0808-7616 [employer]. Care instructions adapted under license by [employer] (which disclaims liability or warranty for this information). If you have questions about a medical condition or this instruction, always ask your healthcare professional. [address] any warranty or liability for your use of this information.

## 2019-10-28 NOTE — ACP (ADVANCE CARE PLANNING)
Advance Care Planning    Advance Care Planning (ACP) Provider Conversation Snapshot    Date of ACP Conversation: 10/28/19  Persons included in Conversation:  patient and POA  Length of ACP Conversation in minutes:  <16 minutes (Non-Billable)    Authorized Decision Maker (if patient is incapable of making informed decisions):    This person is:   Healthcare Agent/Medical Power of  under Advance Directive            For Patients with Decision Making Capacity:   Values/Goals: Exploration of values, goals, and preferences if recovery is not expected, even with continued medical treatment in the event of:  Imminent death  Severe, permanent brain injury    Conversation Outcomes / Follow-Up Plan:   Recommended communicating the plan and making copies for the healthcare agent, personal physician, and others as appropriate (e.g., health system)

## 2019-10-28 NOTE — PROGRESS NOTES
Marilia Hernandez is a 80 y.o. female who presents today for Annual Wellness Visit  . 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 Evonnie Orn    Lower extremity edema R60.0    Pyuria R82.81   . Today patient is here for Medicare wellness exam.. Looking at assisted living communities. I encouraged her to do this for her safety and also for social well-being. Rib Fracture: In September following a ground-level fall. She is healed up from this. Still getting PT. Still helping her. No falls. Denies any significant pain. Paranoia: Patient continues to take Zyprexa and notes that her mental health is stable. Hypertension -previously on Aldactone. This was stopped due to low blood pressures during hospitalization. Blood pressure is elevated today. Home BP is 132-144/79-84. Hypertension ROS: taking medications as instructed, no medication side effects noted, no TIA's, no chest pain on exertion, no dyspnea on exertion, no swelling of ankles     reports that she has never smoked. She has never used smokeless tobacco.    reports that she does not drink alcohol. BP Readings from Last 2 Encounters:   10/28/19 152/90   09/20/19 138/68     Health maintenance hx includes:  Exercise: moderately active. Form of exercise: PT   Diet: generally follows a low fat low cholesterol diet, Lives alone, family close by. Social: Considering assisted living.   Currently living independent. No help with ADL's. No driving. Screening:    Colon cancer screening: N/A   Breast cancer screening: N/A   Cervical cancer screening:N/A   Osteoporosis screening:  N/A      Immunizations:     Immunization History   Administered Date(s) Administered    (RETIRED) Pneumococcal Vaccine (Unspecified Type) 05/14/2001    Influenza High Dose Vaccine PF 10/27/2016, 10/23/2017    Influenza Vaccine 09/25/2013    Influenza Vaccine (Tri) Adjuvanted 10/16/2018, 10/28/2019    Influenza Vaccine Split 11/10/2010, 11/17/2011, 09/19/2012    Pneumococcal Conjugate (PCV-13) 10/27/2016      Immunization status: PNA 23 reportedly has been done in the past but we do not have these records. ROS  Review of Systems   Constitutional: Negative for chills, fever and weight loss. HENT: Negative for congestion and sore throat. Eyes: Negative for blurred vision, double vision and photophobia. Respiratory: Negative for cough and shortness of breath. Cardiovascular: Negative for chest pain, palpitations and leg swelling. Rib pain at times. Gastrointestinal: Negative for abdominal pain, constipation, diarrhea, heartburn, nausea and vomiting. Genitourinary: Negative for dysuria, frequency and urgency. Musculoskeletal: Negative for back pain, joint pain, myalgias and neck pain. Skin: Negative for rash. Neurological: Negative. Negative for headaches. Endo/Heme/Allergies: Does not bruise/bleed easily. Psychiatric/Behavioral: Positive for memory loss. Negative for depression, hallucinations, substance abuse and suicidal ideas. The patient has insomnia. The patient is not nervous/anxious. Visit Vitals  /90 (BP 1 Location: Left arm, BP Patient Position: Sitting)   Pulse 78   Temp 98.1 °F (36.7 °C) (Oral)   Resp 18   Ht 4' 11\" (1.499 m)   Wt 168 lb (76.2 kg)   SpO2 97%   BMI 33.93 kg/m²       Physical Exam   Constitutional: She is oriented to person, place, and time.  She appears well-developed and well-nourished. No distress. HENT:   Head: Normocephalic and atraumatic. Neck: Normal range of motion. Neck supple. No thyromegaly present. Cardiovascular: Normal rate and regular rhythm. No murmur heard. Pulmonary/Chest: Effort normal and breath sounds normal. She has no wheezes. Abdominal: Soft. Bowel sounds are normal. She exhibits no distension. Musculoskeletal: She exhibits no edema. Neurological: She is alert and oriented to person, place, and time. No cranial nerve deficit. Skin: Skin is warm and dry. Psychiatric: She has a normal mood and affect. Her behavior is normal.         Current Outpatient Medications   Medication Sig    OLANZapine (ZYPREXA) 5 mg tablet Take 1 Tab by mouth nightly.  ergocalciferol (ERGOCALCIFEROL) 50,000 unit capsule Take 1 Cap by mouth every seven (7) days.  dexamethasone (DECADRON) 0.1 % ophthalmic solution Apply 1-2 drops into each ear PRN    CARBOXYMETHYLCELLULOSE SODIUM (REFRESH OP) Apply 1 Drop to eye as needed (dry eye).  latanoprost (XALATAN) 0.005 % ophthalmic solution Administer 1 Drop to both eyes nightly.  lidocaine (ASPERCREME, LIDOCAINE,) 4 % topical cream Apply  to affected area.  acetaminophen (TYLENOL) 500 mg tablet Take 500 mg by mouth every eight (8) hours as needed for Pain. No current facility-administered medications for this visit. Past Medical History:   Diagnosis Date    Depression     \"better since moving into my daughters mother-in-law suite\"    DJD (degenerative joint disease), lumbar     GERD (gastroesophageal reflux disease)     stricture.  Glaucoma     [place] (hard of hearing)     HTN (hypertension)     Keratoconjunctivitis     sicca    Macular degeneration     macular degeneration vs ooptic neuropathy    ABAD (obstructive sleep apnea)     decided not to use d/t frequent bathroom trips at night.  didn't help    Osteopenia     Venous insufficiency 10/10/2011      Past Surgical History:   Procedure Laterality Date    BREAST SURGERY PROCEDURE UNLISTED  30 yrs ago    Breast Lumpectomy (side?)    EGD  2004    HX CHOLECYSTECTOMY  07/2018    HX COLONOSCOPY  2004    HX ENDOSCOPY  2004    HX HYSTERECTOMY      HX ORTHOPAEDIC  2005    cyst/tumor left hip    HX ORTHOPAEDIC  2014    Right Total Hip    Dr. Jayme Tompkins J-W    HX PACEMAKER  11/04/2011    [facility], Dr Tammy Mann OP    HX PACEMAKER      STRESS TEST LEXISCAN/CARDIOLITE  10/3/11    normal perfusion, EF 72%      Social History     Tobacco Use    Smoking status: Never Smoker    Smokeless tobacco: Never Used   Substance Use Topics    Alcohol use: No      Family History   Problem Relation Age of Onset    Heart Failure Mother     Psychiatric Disorder Mother     Heart Disease Father     Stroke Brother     Psychiatric Disorder Other         aunt        Allergies   Allergen Reactions    Meperidine Hives     Other reaction(s): Hives    Ace Inhibitors Unknown (comments)    Aldactone [Spironolactone] Other (comments)     hyperkalemia    Codeine Unknown (comments)    Miralax [Polyethylene Glycol 3350] Nausea and Vomiting    Other Plant, Animal, Environmental Rash     Bilateral Hearing Aids cause severe reaction per pt report    Percocet [Oxycodone-Acetaminophen] Unknown (comments)    Statins-Hmg-Coa Reductase Inhibitors Unknown (comments)    Sular [Nisoldipine] Rash    Zetia [Ezetimibe] Unknown (comments)        Assessment/Plan  Diagnoses and all orders for this visit:    1. Medicare annual wellness visit, subsequent -I agree with her considering moving into an assisted living community though being an independent living. Harshad Hassan was counseled on age-appropriate/ guideline-based risk prevention behaviors and screening for a 80y.o. year old   female . We also discussed adjustments in screening based on family history if necessary.    Printed instructions for preventative screening guidelines and healthy behaviors given to patient with after visit summary. 2. Encounter for immunization  -     INFLUENZA VACCINE INACTIVATED (IIV), SUBUNIT, ADJUVANTED, IM  -     ADMIN INFLUENZA VIRUS VAC    3. Advanced directives, counseling/discussion    4. Screening for depression  -     DEPRESSION SCREEN ANNUAL    5. Closed fracture of multiple ribs, unspecified laterality, sequela -healing well. Patient denies any pain. We discussed with daughter that if her endurance starts decreasing as home health stop seeing her we may have to do outpatient PT or do recurrent home health PT.    6. Essential hypertension -stable with no therapy. Continue to monitor blood pressure regularly. -     CBC WITH AUTOMATED DIFF  -     METABOLIC PANEL, BASIC    7. Depression/insomnia: Patient continues to take Zyprexa with good relief. She is sleeping well. Continue current therapy. Julianna Bowie MD  10/28/2019    This note was created with the help of speech recognition software Kira Álvarez) and may contain some 'sound alike' errors. Discussed the patient's BMI with her. The BMI follow up plan is as follows:     dietary management education, guidance, and counseling  encourage exercise  monitor weight  prescribed dietary intake    An After Visit Summary was printed and given to the patient.

## 2019-11-02 LAB
BASOPHILS # BLD AUTO: 0 X10E3/UL (ref 0–0.2)
BASOPHILS NFR BLD AUTO: 1 %
BUN SERPL-MCNC: 16 MG/DL (ref 8–27)
BUN/CREAT SERPL: 19 (ref 12–28)
CALCIUM SERPL-MCNC: 9.4 MG/DL (ref 8.7–10.3)
CHLORIDE SERPL-SCNC: 109 MMOL/L (ref 96–106)
CO2 SERPL-SCNC: 20 MMOL/L (ref 20–29)
CREAT SERPL-MCNC: 0.86 MG/DL (ref 0.57–1)
EOSINOPHIL # BLD AUTO: 0.1 X10E3/UL (ref 0–0.4)
EOSINOPHIL NFR BLD AUTO: 1 %
ERYTHROCYTE [DISTWIDTH] IN BLOOD BY AUTOMATED COUNT: 12.8 % (ref 12.3–15.4)
GLUCOSE SERPL-MCNC: 96 MG/DL (ref 65–99)
HCT VFR BLD AUTO: 45.5 % (ref 34–46.6)
HGB BLD-MCNC: 15.3 G/DL (ref 11.1–15.9)
IMM GRANULOCYTES # BLD AUTO: 0 X10E3/UL (ref 0–0.1)
IMM GRANULOCYTES NFR BLD AUTO: 0 %
LYMPHOCYTES # BLD AUTO: 1 X10E3/UL (ref 0.7–3.1)
LYMPHOCYTES NFR BLD AUTO: 15 %
MCH RBC QN AUTO: 28.9 PG (ref 26.6–33)
MCHC RBC AUTO-ENTMCNC: 33.6 G/DL (ref 31.5–35.7)
MCV RBC AUTO: 86 FL (ref 79–97)
MONOCYTES # BLD AUTO: 0.7 X10E3/UL (ref 0.1–0.9)
MONOCYTES NFR BLD AUTO: 11 %
NEUTROPHILS # BLD AUTO: 4.7 X10E3/UL (ref 1.4–7)
NEUTROPHILS NFR BLD AUTO: 72 %
PLATELET # BLD AUTO: 192 X10E3/UL (ref 150–450)
POTASSIUM SERPL-SCNC: 4.2 MMOL/L (ref 3.5–5.2)
RBC # BLD AUTO: 5.29 X10E6/UL (ref 3.77–5.28)
SODIUM SERPL-SCNC: 147 MMOL/L (ref 134–144)
WBC # BLD AUTO: 6.5 X10E3/UL (ref 3.4–10.8)

## 2019-11-25 ENCOUNTER — TELEPHONE (OUTPATIENT)
Dept: INTERNAL MEDICINE CLINIC | Age: 84
End: 2019-11-25

## 2019-11-25 NOTE — TELEPHONE ENCOUNTER
Patient's daughter is requesting an order for a hearing exam to take to patient's ENT apt on 12/11 due to her hearing aids no longer working properly .  Daughter will pick the order up when it is available at the  , she can be reached at [phone]

## 2020-01-28 ENCOUNTER — OFFICE VISIT (OUTPATIENT)
Dept: INTERNAL MEDICINE CLINIC | Age: 85
End: 2020-01-28

## 2020-01-28 ENCOUNTER — HOSPITAL ENCOUNTER (OUTPATIENT)
Dept: LAB | Age: 85
Discharge: HOME | End: 2020-01-28

## 2020-01-28 VITALS
WEIGHT: 153 LBS | HEIGHT: 59 IN | SYSTOLIC BLOOD PRESSURE: 142 MMHG | DIASTOLIC BLOOD PRESSURE: 55 MMHG | RESPIRATION RATE: 16 BRPM | TEMPERATURE: 97.7 F | BODY MASS INDEX: 30.84 KG/M2 | OXYGEN SATURATION: 96 % | HEART RATE: 65 BPM

## 2020-01-28 DIAGNOSIS — F51.04 PSYCHOPHYSIOLOGICAL INSOMNIA: ICD-10-CM

## 2020-01-28 DIAGNOSIS — K59.00 CONSTIPATION, UNSPECIFIED CONSTIPATION TYPE: ICD-10-CM

## 2020-01-28 DIAGNOSIS — I10 ESSENTIAL HYPERTENSION: ICD-10-CM

## 2020-01-28 DIAGNOSIS — F32.A DEPRESSION, UNSPECIFIED DEPRESSION TYPE: ICD-10-CM

## 2020-01-28 DIAGNOSIS — Z95.0 PRESENCE OF PERMANENT CARDIAC PACEMAKER: ICD-10-CM

## 2020-01-28 DIAGNOSIS — Z02.89 ENCOUNTER FOR COMPLETION OF FORM WITH PATIENT: Primary | ICD-10-CM

## 2020-01-28 PROBLEM — R60.0 LOWER EXTREMITY EDEMA: Status: RESOLVED | Noted: 2019-08-20 | Resolved: 2020-01-28
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
## 2020-01-28 LAB
ALBUMIN SERPL-MCNC: 3.7 G/DL (ref 3.5–5)
ALBUMIN/GLOB SERPL: 1.5 {RATIO} (ref 1.1–2.2)
ALP SERPL-CCNC: 63 U/L (ref 45–117)
ALT SERPL-CCNC: 15 U/L (ref 12–78)
ANION GAP SERPL CALC-SCNC: 7 MMOL/L (ref 5–15)
AST SERPL-CCNC: 18 U/L (ref 15–37)
BASOPHILS # BLD: 0 K/UL (ref 0–0.1)
BASOPHILS NFR BLD: 1 % (ref 0–1)
BILIRUB SERPL-MCNC: 0.7 MG/DL (ref 0.2–1)
BUN SERPL-MCNC: 13 MG/DL (ref 6–20)
BUN/CREAT SERPL: 16 (ref 12–20)
CALCIUM SERPL-MCNC: 9.4 MG/DL (ref 8.5–10.1)
CHLORIDE SERPL-SCNC: 113 MMOL/L (ref 97–108)
CO2 SERPL-SCNC: 21 MMOL/L (ref 21–32)
CREAT SERPL-MCNC: 0.81 MG/DL (ref 0.55–1.02)
DIFFERENTIAL METHOD BLD: ABNORMAL
EOSINOPHIL # BLD: 0.1 K/UL (ref 0–0.4)
EOSINOPHIL NFR BLD: 2 % (ref 0–7)
ERYTHROCYTE [DISTWIDTH] IN BLOOD BY AUTOMATED COUNT: 13.6 % (ref 11.5–14.5)
GLOBULIN SER CALC-MCNC: 2.5 G/DL (ref 2–4)
GLUCOSE SERPL-MCNC: 99 MG/DL (ref 65–100)
HCT VFR BLD AUTO: 47.1 % (ref 35–47)
HGB BLD-MCNC: 15.5 G/DL (ref 11.5–16)
IMM GRANULOCYTES # BLD AUTO: 0 K/UL (ref 0–0.04)
IMM GRANULOCYTES NFR BLD AUTO: 0 % (ref 0–0.5)
LYMPHOCYTES # BLD: 0.9 K/UL (ref 0.8–3.5)
LYMPHOCYTES NFR BLD: 14 % (ref 12–49)
MCH RBC QN AUTO: 29.6 PG (ref 26–34)
MCHC RBC AUTO-ENTMCNC: 32.9 G/DL (ref 30–36.5)
MCV RBC AUTO: 90.1 FL (ref 80–99)
MONOCYTES # BLD: 0.8 K/UL (ref 0–1)
MONOCYTES NFR BLD: 12 % (ref 5–13)
NEUTS SEG # BLD: 4.6 K/UL (ref 1.8–8)
NEUTS SEG NFR BLD: 72 % (ref 32–75)
NRBC # BLD: 0 K/UL (ref 0–0.01)
NRBC BLD-RTO: 0 PER 100 WBC
PLATELET # BLD AUTO: 148 K/UL (ref 150–400)
POTASSIUM SERPL-SCNC: 4 MMOL/L (ref 3.5–5.1)
PROT SERPL-MCNC: 6.2 G/DL (ref 6.4–8.2)
RBC # BLD AUTO: 5.23 M/UL (ref 3.8–5.2)
SODIUM SERPL-SCNC: 141 MMOL/L (ref 136–145)
WBC # BLD AUTO: 6.4 K/UL (ref 3.6–11)

## 2020-01-28 RX ORDER — OLANZAPINE 2.5 MG/1
2.5 TABLET ORAL
Qty: 90 TAB | Refills: 1 | Status: SHIPPED | OUTPATIENT
Start: 2020-01-28 | End: 2020-06-09 | Stop reason: SDUPTHER

## 2020-01-28 RX ORDER — DOCUSATE SODIUM 100 MG/1
100 CAPSULE, LIQUID FILLED ORAL
COMMUNITY

## 2020-01-28 NOTE — PROGRESS NOTES
Vani Moreno is a 80 y.o. female who presents today for No chief complaint on file. .      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 Today patient is here for follow-up. .     Patient will be moving to independent living at the [facility]. She is here with her daughter for form completion. She does not need any tuberculosis screening. She is not exhibiting any signs or symptoms of tuberculosis. Report of TB screening form has been filled out by myself. Constipation: Patient did stop her olanzapine due to the fact that she thought this was causing constipation. Her constipation has not improved. We discussed using stool softeners. Hypertension-blood pressure has been relatively well controlled after last hospitalization. She remained off her diuretic. Since she notes that her home blood pressure readings have been stable. Hypertension ROS: not taking medications regularly as instructed     reports that she has never smoked. She has never used smokeless tobacco.    reports no history of alcohol use. BP Readings from Last 2 Encounters:   01/28/20 142/55   10/28/19 132/78     Paranoia/insomina: Patient has been relatively stable on Zyprexa for some time. Family notes that she has had no paranoia. Some fatigue with 5mg, will decrease to 2.5mg. She is capable of administering her own medications. Has not had any recurrent falls. Has PPM since 2011 and has been seen by Dr. Michelle Payne.   This is due to a history of A. fib as well as V. tach/SVT. Patient did have an echocardiogram which was stable. Noted low burden of A. fib and high risk of bleed and therefore remaining off anticoagulation. ROS  Review of Systems   Constitutional: Negative for chills, fever and weight loss. HENT: Negative for congestion and sore throat. Eyes: Negative for blurred vision, double vision and photophobia. Respiratory: Negative for cough and shortness of breath. Cardiovascular: Negative for chest pain, palpitations and leg swelling. Gastrointestinal: Negative for abdominal pain, constipation, diarrhea, heartburn, nausea and vomiting. Genitourinary: Negative for dysuria, frequency and urgency. Musculoskeletal: Negative for joint pain and myalgias. Skin: Negative for rash. Neurological: Negative. Negative for headaches. Endo/Heme/Allergies: Does not bruise/bleed easily. Psychiatric/Behavioral: Positive for memory loss. Negative for depression and suicidal ideas. The patient has insomnia. Visit Vitals  /55   Pulse 65   Temp 97.7 °F (36.5 °C) (Oral)   Resp 16   Ht 4' 11\" (1.499 m)   Wt 153 lb (69.4 kg)   SpO2 96%   BMI 30.90 kg/m²       Physical Exam  Constitutional:       General: She is not in acute distress. Appearance: She is well-developed. HENT:      Head: Normocephalic and atraumatic. Neck:      Musculoskeletal: Normal range of motion and neck supple. Thyroid: No thyromegaly. Cardiovascular:      Rate and Rhythm: Normal rate and regular rhythm. Heart sounds: No murmur. Pulmonary:      Effort: Pulmonary effort is normal.      Breath sounds: Normal breath sounds. No wheezing. Abdominal:      General: Bowel sounds are normal. There is no distension. Palpations: Abdomen is soft. Skin:     General: Skin is warm and dry. Neurological:      Mental Status: She is alert and oriented to person, place, and time. Cranial Nerves: No cranial nerve deficit.    Psychiatric: Behavior: Behavior normal.           Current Outpatient Medications   Medication Sig    OLANZapine (ZYPREXA) 2.5 mg tablet Take 1 Tab by mouth nightly.  docusate sodium (COLACE) 100 mg capsule Take 100 mg by mouth two (2) times daily as needed for Constipation.  ergocalciferol (ERGOCALCIFEROL) 50,000 unit capsule Take 1 Cap by mouth every seven (7) days. No current facility-administered medications for this visit. Past Medical History:   Diagnosis Date    Depression     \"better since moving into my daughters mother-in-law suite\"    DJD (degenerative joint disease), lumbar     GERD (gastroesophageal reflux disease)     stricture.  Glaucoma     [place] (hard of hearing)     HTN (hypertension)     Keratoconjunctivitis     sicca    Macular degeneration     macular degeneration vs ooptic neuropathy    ABAD (obstructive sleep apnea)     decided not to use d/t frequent bathroom trips at night.  didn't help    Osteopenia     Venous insufficiency 10/10/2011      Past Surgical History:   Procedure Laterality Date    BREAST SURGERY PROCEDURE UNLISTED  30 yrs ago    Breast Lumpectomy (side?)    EGD  2004    HX CHOLECYSTECTOMY  07/2018    HX COLONOSCOPY  2004    HX ENDOSCOPY  2004    HX HYSTERECTOMY      HX ORTHOPAEDIC  2005    cyst/tumor left hip    HX ORTHOPAEDIC  2014    Right Total Hip    Dr. Markell Geronimo J-W    HX PACEMAKER  11/04/2011    [facility], Dr Nelli Shields OP    HX PACEMAKER      STRESS TEST LEXISCAN/CARDIOLITE  10/3/11    normal perfusion, EF 72%      Social History     Tobacco Use    Smoking status: Never Smoker    Smokeless tobacco: Never Used   Substance Use Topics    Alcohol use: No      Family History   Problem Relation Age of Onset    Heart Failure Mother     Psychiatric Disorder Mother     Heart Disease Father     Stroke Brother     Psychiatric Disorder Other         aunt        Allergies   Allergen Reactions    Meperidine Hives     Other reaction(s): Hives    Ace Inhibitors Unknown (comments)    Aldactone [Spironolactone] Other (comments)     hyperkalemia    Codeine Unknown (comments)    Miralax [Polyethylene Glycol 3350] Nausea and Vomiting    Other Plant, Animal, Environmental Rash     Bilateral Hearing Aids cause severe reaction per pt report    Percocet [Oxycodone-Acetaminophen] Unknown (comments)    Statins-Hmg-Coa Reductase Inhibitors Unknown (comments)    Sular [Nisoldipine] Rash    Zetia [Ezetimibe] Unknown (comments)        Assessment/Plan  Diagnoses and all orders for this visit:    1. Encounter for completion of form with patient-filled out forms for patient to go to independent living at the [facility]. She is quite anxious and I discussed with her that this is quite normal.  Overall she is able to administer her own medications and live somewhat independently. We will place her on a low-sodium cardiac diet. No need for tuberculosis testing. 2. Psychophysiological insomnia-sleep is been a bit worse since stopping the Zyprexa. No difference in constipation. Will resume this but at half dose. -     OLANZapine (ZYPREXA) 2.5 mg tablet; Take 1 Tab by mouth nightly. 3. Depression, unspecified depression type-mental health overall is stable. -     OLANZapine (ZYPREXA) 2.5 mg tablet; Take 1 Tab by mouth nightly. 4. Essential hypertension-blood pressures remained stable off all medications. Continue to monitor. Diastolic is a bit low to add any medications.  -     METABOLIC PANEL, COMPREHENSIVE; Future  -     CBC WITH AUTOMATED DIFF; Future    5. Constipation, unspecified constipation type-discussed using daily stool softeners and increase p.o. intake of water and physical activity. 6. Presence of permanent cardiac pacemaker-patient recently seen by cardiology. Follow-up and Dispositions    · Return in about 3 months (around 4/28/2020).          Gosia Pizano MD  1/28/2020    This note was created with the help of speech recognition software (Dragon) and may contain some 'sound alike' errors.

## 2020-01-28 NOTE — LETTER
1/28/2020 1:01 PM 
 
Ms. Justine Morel [id number] Current Outpatient Medications:  
  OLANZapine (ZYPREXA) 2.5 mg tablet, Take 1 Tab by mouth nightly., Disp: 90 Tab, Rfl: 1 
  docusate sodium (COLACE) 100 mg capsule, Take 100 mg by mouth two (2) times daily as needed for Constipation. , Disp: , Rfl:  
  ergocalciferol (ERGOCALCIFEROL) 50,000 unit capsule, Take 1 Cap by mouth every seven (7) days. , Disp: 12 Cap, Rfl: 3 Please call if you have any questions [phone] . Sincerely, Agatha Euceda MD

## 2020-01-29 NOTE — PROGRESS NOTES
Verified 3 Pt ID. Notified Pt's daughter, on HIPAA and she verbalized understanding.    Lab results have been mailed as per her request.

## 2020-04-12 DIAGNOSIS — E55.9 VITAMIN D DEFICIENCY: ICD-10-CM

## 2020-04-12 RX ORDER — ERGOCALCIFEROL 1.25 MG/1
CAPSULE ORAL
Qty: 12 CAP | Refills: 3 | Status: SHIPPED | OUTPATIENT
Start: 2020-04-12 | End: 2021-03-24

## 2020-04-29 ENCOUNTER — TELEPHONE (OUTPATIENT)
Dept: INTERNAL MEDICINE CLINIC | Age: 85
End: 2020-04-29

## 2020-04-29 NOTE — TELEPHONE ENCOUNTER
Received fax from Dang Goodrich, nurse @[facility] [address] reporting Pt c/o vertigo when changing positions and edema in lower extremities. Continue to encourage elevation and to wait a few minutes when changing positions before standing. Pt continues to apply lotion to arms and chest for dry skin, denies itching. VS: 180/84, 60, 16 and 97.7 F. Dang Goodrich is reaching out to possibly restart Pt on BP and fluid pill. F/u with pt's daughter, she states Pt is in isolation and is unable to exercise. Pt thinks BP is elevated d/t being inactive. Pt's daughter states they were instructed to let provider know if BP stayed at or above 180 for a few days. She also states Pt has a hx of elevated BP when startled or changing positions quickly. Pt does not have access to a smart phone and is unable to have a VV. Pt has a new phone appropriate for the hard of hearing.    Attempted to contact Pt to schedule telemedicine appt, unable to get through to Pt.   [phone]

## 2020-04-30 NOTE — TELEPHONE ENCOUNTER
Attempted to call Pt, no answer. Called [facility] at [phone] and discussed with nurse. She states Pt is in the memory care unit and is unable to hold a conversation or communicate effectively. Per nurse, a telemedicine appt would be very challenging for pt. Nurse states they have a provider who could evaluate Pt, but an order from PCP may be needed. LVM for charge nurse to R/C to office and coordinate Pt's care.

## 2020-06-09 ENCOUNTER — TELEPHONE (OUTPATIENT)
Dept: INTERNAL MEDICINE CLINIC | Age: 85
End: 2020-06-09

## 2020-06-09 DIAGNOSIS — F51.04 PSYCHOPHYSIOLOGICAL INSOMNIA: ICD-10-CM

## 2020-06-09 DIAGNOSIS — F32.A DEPRESSION, UNSPECIFIED DEPRESSION TYPE: ICD-10-CM

## 2020-06-09 RX ORDER — OLANZAPINE 2.5 MG/1
2.5 TABLET ORAL
Qty: 90 TAB | Refills: 1 | Status: SHIPPED | OUTPATIENT
Start: 2020-06-09 | End: 2020-07-07

## 2020-06-09 NOTE — TELEPHONE ENCOUNTER
Pharmacist states a new script for Olanzapine 2.5mg is needed as pt's other pharmacy does not have the med. Or it can be called in. Thanks.

## 2020-07-07 DIAGNOSIS — F51.04 PSYCHOPHYSIOLOGICAL INSOMNIA: ICD-10-CM

## 2020-07-07 DIAGNOSIS — F32.A DEPRESSION, UNSPECIFIED DEPRESSION TYPE: ICD-10-CM

## 2020-07-07 RX ORDER — OLANZAPINE 2.5 MG/1
TABLET ORAL
Qty: 90 TAB | Refills: 1 | Status: SHIPPED | OUTPATIENT
Start: 2020-07-07 | End: 2020-09-29 | Stop reason: SDUPTHER

## 2020-09-29 ENCOUNTER — OFFICE VISIT (OUTPATIENT)
Dept: INTERNAL MEDICINE CLINIC | Age: 85
End: 2020-09-29
Payer: MEDICARE

## 2020-09-29 VITALS
DIASTOLIC BLOOD PRESSURE: 80 MMHG | HEART RATE: 67 BPM | RESPIRATION RATE: 16 BRPM | WEIGHT: 160.6 LBS | SYSTOLIC BLOOD PRESSURE: 165 MMHG | BODY MASS INDEX: 32.44 KG/M2 | TEMPERATURE: 98.5 F | OXYGEN SATURATION: 95 %

## 2020-09-29 DIAGNOSIS — Z23 NEEDS FLU SHOT: ICD-10-CM

## 2020-09-29 DIAGNOSIS — K59.00 CONSTIPATION, UNSPECIFIED CONSTIPATION TYPE: ICD-10-CM

## 2020-09-29 DIAGNOSIS — L30.9 DERMATITIS: ICD-10-CM

## 2020-09-29 DIAGNOSIS — F51.04 PSYCHOPHYSIOLOGICAL INSOMNIA: ICD-10-CM

## 2020-09-29 DIAGNOSIS — I10 ESSENTIAL HYPERTENSION: Primary | ICD-10-CM

## 2020-09-29 DIAGNOSIS — F32.A DEPRESSION, UNSPECIFIED DEPRESSION TYPE: ICD-10-CM

## 2020-09-29 PROCEDURE — G8427 DOCREV CUR MEDS BY ELIG CLIN: HCPCS | Performed by: INTERNAL MEDICINE

## 2020-09-29 PROCEDURE — 90694 VACC AIIV4 NO PRSRV 0.5ML IM: CPT

## 2020-09-29 PROCEDURE — 1101F PT FALLS ASSESS-DOCD LE1/YR: CPT | Performed by: INTERNAL MEDICINE

## 2020-09-29 PROCEDURE — G8417 CALC BMI ABV UP PARAM F/U: HCPCS | Performed by: INTERNAL MEDICINE

## 2020-09-29 PROCEDURE — 1090F PRES/ABSN URINE INCON ASSESS: CPT | Performed by: INTERNAL MEDICINE

## 2020-09-29 PROCEDURE — G0463 HOSPITAL OUTPT CLINIC VISIT: HCPCS | Performed by: INTERNAL MEDICINE

## 2020-09-29 PROCEDURE — 99214 OFFICE O/P EST MOD 30 MIN: CPT | Performed by: INTERNAL MEDICINE

## 2020-09-29 PROCEDURE — G8536 NO DOC ELDER MAL SCRN: HCPCS | Performed by: INTERNAL MEDICINE

## 2020-09-29 PROCEDURE — G9717 DOC PT DX DEP/BP F/U NT REQ: HCPCS | Performed by: INTERNAL MEDICINE

## 2020-09-29 RX ORDER — POLYETHYLENE GLYCOL 3350 17 G/17G
POWDER, FOR SOLUTION ORAL
Qty: 30 EACH | Refills: 4 | Status: SHIPPED | OUTPATIENT
Start: 2020-09-29

## 2020-09-29 RX ORDER — OLANZAPINE 2.5 MG/1
TABLET ORAL
Qty: 90 TAB | Refills: 1 | Status: SHIPPED | OUTPATIENT
Start: 2020-09-29 | End: 2021-06-12

## 2020-09-29 RX ORDER — TRIAMCINOLONE ACETONIDE 0.25 MG/G
CREAM TOPICAL
Qty: 15 G | Refills: 0 | Status: SHIPPED | OUTPATIENT
Start: 2020-09-29 | End: 2022-07-22 | Stop reason: ALTCHOICE

## 2020-09-29 RX ORDER — LATANOPROST 50 UG/ML
SOLUTION/ DROPS OPHTHALMIC
COMMUNITY
Start: 2020-07-28 | End: 2022-06-03

## 2020-09-29 RX ORDER — SPIRONOLACTONE 25 MG/1
25 TABLET ORAL DAILY
Qty: 30 TAB | Refills: 5 | Status: SHIPPED | OUTPATIENT
Start: 2020-09-29 | End: 2020-11-23

## 2020-09-29 NOTE — PROGRESS NOTES
Lissa Bedoya is a 80 y.o. female who presents today for Follow-up (flu shot)  . 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 Today patient is here for follow-up. Rufino Rojas Hypertension-has been off Aldactone for some time. Recently blood pressure including at her facility has been elevated. She does get this periodically checked by a nurse. We will resume her Aldactone. reports that she has never smoked. She has never used smokeless tobacco.    reports no history of alcohol use. BP Readings from Last 2 Encounters:   09/29/20 (!) 181/80   01/28/20 142/55     Patient has been having more itching in her ears. She reports that this is likely an allergy to her hearing aids. Mild LE edema. Constipation continues to be an issue. She is taking Colace regularly. We discussed taking occasional MiraLAX. She will try to increase her fluid intake. ROS  Review of Systems   Constitutional: Negative for chills, fever and weight loss. HENT: Negative for congestion and sore throat. Eyes: Negative for blurred vision, double vision and photophobia. Respiratory: Negative for cough and shortness of breath. Cardiovascular: Positive for leg swelling (mild). Negative for chest pain and palpitations. Gastrointestinal: Positive for constipation. Negative for abdominal pain, diarrhea, heartburn, nausea and vomiting. Genitourinary: Negative for dysuria, frequency and urgency.    Musculoskeletal: Negative for joint pain and myalgias. Skin: Negative for rash. Neurological: Negative. Negative for headaches. Endo/Heme/Allergies: Does not bruise/bleed easily. Psychiatric/Behavioral: Negative for memory loss and suicidal ideas. The patient has insomnia. Visit Vitals  BP (!) 181/80 (BP 1 Location: Left arm, BP Patient Position: Sitting)   Pulse 67   Temp 98.5 °F (36.9 °C) (Oral)   Resp 16   Wt 160 lb 9.6 oz (72.8 kg)   SpO2 95%   BMI 32.44 kg/m²       Physical Exam  Constitutional:       General: She is not in acute distress. Appearance: She is well-developed. HENT:      Head: Normocephalic and atraumatic. Ears:      Comments: Dry scaly skin to the inside of the ears. No signs of infection. Neck:      Musculoskeletal: Normal range of motion and neck supple. Thyroid: No thyromegaly. Cardiovascular:      Rate and Rhythm: Normal rate and regular rhythm. Heart sounds: No murmur. Pulmonary:      Effort: Pulmonary effort is normal.      Breath sounds: Normal breath sounds. No wheezing. Abdominal:      General: Bowel sounds are normal. There is no distension. Palpations: Abdomen is soft. Musculoskeletal:      Comments: Very mild pitting edema to bilateral lower extremities. Skin:     General: Skin is warm and dry. Neurological:      Mental Status: She is alert and oriented to person, place, and time. Cranial Nerves: No cranial nerve deficit. Psychiatric:         Behavior: Behavior normal.           Current Outpatient Medications   Medication Sig    latanoprost (XALATAN) 0.005 % ophthalmic solution     OLANZapine (ZyPREXA) 2.5 mg tablet TAKE 1 TABLET BY MOUTH EVERY DAY AT NIGHT    ergocalciferol (ERGOCALCIFEROL) 1,250 mcg (50,000 unit) capsule TAKE ONE CAPSULE BY MOUTH ONE TIME PER WEEK    docusate sodium (COLACE) 100 mg capsule Take 100 mg by mouth two (2) times daily as needed for Constipation. No current facility-administered medications for this visit.          Past Medical History:   Diagnosis Date    Depression     \"better since moving into my daughters mother-in-law suite\"    DJD (degenerative joint disease), lumbar     GERD (gastroesophageal reflux disease)     stricture.  Glaucoma     [place] (hard of hearing)     HTN (hypertension)     Keratoconjunctivitis     sicca    Macular degeneration     macular degeneration vs ooptic neuropathy    ABAD (obstructive sleep apnea)     decided not to use d/t frequent bathroom trips at night.  didn't help    Osteopenia     Venous insufficiency 10/10/2011      Past Surgical History:   Procedure Laterality Date    BREAST SURGERY PROCEDURE UNLISTED  30 yrs ago    Breast Lumpectomy (side?)    EGD  2004    HX CHOLECYSTECTOMY  07/2018    HX COLONOSCOPY  2004    HX ENDOSCOPY  2004    HX HYSTERECTOMY      HX ORTHOPAEDIC  2005    cyst/tumor left hip    HX ORTHOPAEDIC  2014    Right Total Hip    Dr. Jaramillo Reno [facility]    HX PACEMAKER  11/04/2011    [facility]ja[facility] ZAMORA, Dr Kendrick Flores [facility]    HX PACEMAKER      STRESS TEST LEXISCAN/CARDIOLITE  10/3/11    normal perfusion, EF 72%      Social History     Tobacco Use    Smoking status: Never Smoker    Smokeless tobacco: Never Used   Substance Use Topics    Alcohol use: No      Family History   Problem Relation Age of Onset    Heart Failure Mother     Psychiatric Disorder Mother     Heart Disease Father     Stroke Brother     Psychiatric Disorder Other         aunt        Allergies   Allergen Reactions    Meperidine Hives     Other reaction(s): Hives    Ace Inhibitors Unknown (comments)    Aldactone [Spironolactone] Other (comments)     hyperkalemia    Codeine Unknown (comments)    Miralax [Polyethylene Glycol 3350] Nausea and Vomiting    Other Plant, Animal, Environmental Rash     Bilateral Hearing Aids cause severe reaction per pt report    Percocet [Oxycodone-Acetaminophen] Unknown (comments)    Statins-Hmg-Coa Reductase Inhibitors Unknown (comments)    Sular [Nisoldipine] Rash    Zetia [Ezetimibe] Unknown (comments)        Assessment/Plan  Diagnoses and all orders for this visit:    1. Essential hypertension -consistently high at her facility. Resume daily Aldactone. Will get blood work done in 2 weeks. She will have nurses at her living facility check her blood pressures. Follow-up with me in 6 weeks. -     spironolactone (ALDACTONE) 25 mg tablet; Take 1 Tab by mouth daily.  -     METABOLIC PANEL, COMPREHENSIVE; Future  -     CBC WITH AUTOMATED DIFF; Future    3. Constipation, unspecified constipation type-on stool softeners. We discussed using PRN MiraLAX. -     polyethylene glycol (MIRALAX) 17 gram packet; Take 17g every other day as needed    4. Psychophysiological insomnia-still having some issues with sleep. Elevated dose of Zyprexa cause constipation. Keep same dose  -     OLANZapine (ZyPREXA) 2.5 mg tablet; TAKE 1 TABLET BY MOUTH EVERY DAY AT NIGHT    5. Depression, unspecified depression type  -     OLANZapine (ZyPREXA) 2.5 mg tablet; TAKE 1 TABLET BY MOUTH EVERY DAY AT NIGHT    6. Dermatitis-2 ears. Likely due to hearing aids. -     triamcinolone acetonide (KENALOG) 0.025 % topical cream; Apply  to affected area daily as needed for Skin Irritation. use thin layer    Flu shot today. Krys Jacobs MD  9/29/2020    This note was created with the help of speech recognition software Eluterio Ray) and may contain some 'sound alike' errors.

## 2020-09-29 NOTE — PATIENT INSTRUCTIONS
Start spironolactone daily. Check BP 3-4 times per week. Start miralax every other day as needed. Try topical cream for ear. Labs in 2-3 weeks. See me in 6 weeks.

## 2020-10-16 ENCOUNTER — HOSPITAL ENCOUNTER (OUTPATIENT)
Dept: LAB | Age: 85
Discharge: HOME | End: 2020-10-16
Payer: MEDICARE

## 2020-10-16 PROCEDURE — 85025 COMPLETE CBC W/AUTO DIFF WBC: CPT

## 2020-10-16 PROCEDURE — 80053 COMPREHEN METABOLIC PANEL: CPT

## 2020-10-16 PROCEDURE — 36415 COLL VENOUS BLD VENIPUNCTURE: CPT

## 2020-10-17 LAB
ALBUMIN SERPL-MCNC: 4.1 G/DL (ref 3.6–4.6)
ALBUMIN/GLOB SERPL: 1.9 {RATIO} (ref 1.2–2.2)
ALP SERPL-CCNC: 86 IU/L (ref 39–117)
ALT SERPL-CCNC: 4 IU/L (ref 0–32)
AST SERPL-CCNC: 13 IU/L (ref 0–40)
BASOPHILS # BLD AUTO: 0.1 X10E3/UL (ref 0–0.2)
BASOPHILS NFR BLD AUTO: 1 %
BILIRUB SERPL-MCNC: 0.5 MG/DL (ref 0–1.2)
BUN SERPL-MCNC: 22 MG/DL (ref 8–27)
BUN/CREAT SERPL: 26 (ref 12–28)
CALCIUM SERPL-MCNC: 9.3 MG/DL (ref 8.7–10.3)
CHLORIDE SERPL-SCNC: 108 MMOL/L (ref 96–106)
CO2 SERPL-SCNC: 22 MMOL/L (ref 20–29)
CREAT SERPL-MCNC: 0.86 MG/DL (ref 0.57–1)
EOSINOPHIL # BLD AUTO: 0.1 X10E3/UL (ref 0–0.4)
EOSINOPHIL NFR BLD AUTO: 2 %
ERYTHROCYTE [DISTWIDTH] IN BLOOD BY AUTOMATED COUNT: 12.6 % (ref 11.7–15.4)
GLOBULIN SER CALC-MCNC: 2.2 G/DL (ref 1.5–4.5)
GLUCOSE SERPL-MCNC: 91 MG/DL (ref 65–99)
HCT VFR BLD AUTO: 45 % (ref 34–46.6)
HGB BLD-MCNC: 15.1 G/DL (ref 11.1–15.9)
IMM GRANULOCYTES # BLD AUTO: 0 X10E3/UL (ref 0–0.1)
IMM GRANULOCYTES NFR BLD AUTO: 0 %
LYMPHOCYTES # BLD AUTO: 1.2 X10E3/UL (ref 0.7–3.1)
LYMPHOCYTES NFR BLD AUTO: 16 %
MCH RBC QN AUTO: 29.2 PG (ref 26.6–33)
MCHC RBC AUTO-ENTMCNC: 33.6 G/DL (ref 31.5–35.7)
MCV RBC AUTO: 87 FL (ref 79–97)
MONOCYTES # BLD AUTO: 0.8 X10E3/UL (ref 0.1–0.9)
MONOCYTES NFR BLD AUTO: 11 %
NEUTROPHILS # BLD AUTO: 5.4 X10E3/UL (ref 1.4–7)
NEUTROPHILS NFR BLD AUTO: 70 %
PLATELET # BLD AUTO: 183 X10E3/UL (ref 150–450)
POTASSIUM SERPL-SCNC: 4.5 MMOL/L (ref 3.5–5.2)
PROT SERPL-MCNC: 6.3 G/DL (ref 6–8.5)
RBC # BLD AUTO: 5.17 X10E6/UL (ref 3.77–5.28)
SODIUM SERPL-SCNC: 143 MMOL/L (ref 134–144)
WBC # BLD AUTO: 7.7 X10E3/UL (ref 3.4–10.8)

## 2020-11-12 NOTE — PROGRESS NOTES
1. Have you been to the ER, urgent care clinic since your last visit? Hospitalized since your last visit? No    2. Have you seen or consulted any other health care providers outside of the [facility] since your last visit? Include any pap smears or colon screening.  No   Chief Complaint   Patient presents with    Hypertension     6 months follow up     Fasting DISPLAY PLAN FREE TEXT

## 2020-11-23 ENCOUNTER — TELEPHONE (OUTPATIENT)
Dept: INTERNAL MEDICINE CLINIC | Age: 85
End: 2020-11-23

## 2020-11-23 DIAGNOSIS — I10 ESSENTIAL HYPERTENSION: ICD-10-CM

## 2020-11-23 RX ORDER — SPIRONOLACTONE 50 MG/1
50 TABLET, FILM COATED ORAL DAILY
Qty: 30 TAB | Refills: 3 | Status: SHIPPED | OUTPATIENT
Start: 2020-11-23 | End: 2021-03-24

## 2020-11-23 NOTE — TELEPHONE ENCOUNTER
----- Message from [place] sent at 11/23/2020  9:01 AM EST -----  Regarding: Dr. Brielle Jiménez Message/Vendor Calls    Caller's first and last name: Bernadette Swenson      Reason for call: Pt had to cancel her 11/23/20 appt today due to daughter had possible exposure to [place] who is her transportation. Pt is experiencing an elevated blood pressure consistently of 165/80. Requesting a call back to see if medication should be increased.        Callback required yes/no and why: yes      Best contact number(s): [phone]      Details to clarify the request: [address] [facility]

## 2020-11-23 NOTE — TELEPHONE ENCOUNTER
Returned call to Efrain. She expresses her mother's bp has been averaging about 165/80. No reports of cardiac symptoms such as headaches, chest pain or sob. The patient did mention to her daughter that upon waking she is a bit lightheaded but is passes after sitting on the side of the bed for a few min. Efrain is questioning if her mother's meds need to be increased. Please advise.

## 2020-11-25 NOTE — TELEPHONE ENCOUNTER
Spoke with pt daughter Tirnh Cobb (on HIPPA) and informed her about spironolactone being increased to 50mg daily and to monitor pt BP.  Pt has been scheduled for f/u apt in December per Dr Maria D Syed request. Trinh Cobb states understanding

## 2021-03-24 DIAGNOSIS — I10 ESSENTIAL HYPERTENSION: ICD-10-CM

## 2021-03-24 DIAGNOSIS — E55.9 VITAMIN D DEFICIENCY: ICD-10-CM

## 2021-03-24 RX ORDER — ERGOCALCIFEROL 1.25 MG/1
CAPSULE ORAL
Qty: 12 CAP | Refills: 3 | Status: SHIPPED | OUTPATIENT
Start: 2021-03-24 | End: 2022-03-05

## 2021-03-24 RX ORDER — SPIRONOLACTONE 50 MG/1
TABLET, FILM COATED ORAL
Qty: 30 TAB | Refills: 3 | Status: SHIPPED | OUTPATIENT
Start: 2021-03-24 | End: 2021-09-10

## 2021-06-12 DIAGNOSIS — F51.04 PSYCHOPHYSIOLOGICAL INSOMNIA: ICD-10-CM

## 2021-06-12 DIAGNOSIS — F32.A DEPRESSION, UNSPECIFIED DEPRESSION TYPE: ICD-10-CM

## 2021-06-12 RX ORDER — OLANZAPINE 2.5 MG/1
TABLET ORAL
Qty: 90 TABLET | Refills: 1 | Status: SHIPPED | OUTPATIENT
Start: 2021-06-12 | End: 2021-12-21

## 2021-06-28 ENCOUNTER — TELEPHONE (OUTPATIENT)
Dept: INTERNAL MEDICINE CLINIC | Age: 86
End: 2021-06-28

## 2021-06-28 NOTE — TELEPHONE ENCOUNTER
Patient states she needs to rescheduled her completely physical before her hearing test on the 17th.  does not have anything open for a completely physical until after the 17th. Please call back and advise.

## 2021-07-13 ENCOUNTER — OFFICE VISIT (OUTPATIENT)
Dept: INTERNAL MEDICINE CLINIC | Age: 86
End: 2021-07-13
Payer: MEDICARE

## 2021-07-13 VITALS
BODY MASS INDEX: 34.96 KG/M2 | DIASTOLIC BLOOD PRESSURE: 68 MMHG | HEIGHT: 59 IN | RESPIRATION RATE: 12 BRPM | WEIGHT: 173.4 LBS | TEMPERATURE: 98.1 F | OXYGEN SATURATION: 94 % | SYSTOLIC BLOOD PRESSURE: 136 MMHG | HEART RATE: 69 BPM

## 2021-07-13 DIAGNOSIS — E55.9 VITAMIN D DEFICIENCY: ICD-10-CM

## 2021-07-13 DIAGNOSIS — N32.81 OAB (OVERACTIVE BLADDER): ICD-10-CM

## 2021-07-13 DIAGNOSIS — F51.04 PSYCHOPHYSIOLOGICAL INSOMNIA: ICD-10-CM

## 2021-07-13 DIAGNOSIS — E66.01 SEVERE OBESITY (BMI 35.0-35.9 WITH COMORBIDITY) (HCC): ICD-10-CM

## 2021-07-13 DIAGNOSIS — I10 ESSENTIAL HYPERTENSION: ICD-10-CM

## 2021-07-13 DIAGNOSIS — F32.A DEPRESSION, UNSPECIFIED DEPRESSION TYPE: ICD-10-CM

## 2021-07-13 DIAGNOSIS — H91.93 DECREASED HEARING OF BOTH EARS: ICD-10-CM

## 2021-07-13 DIAGNOSIS — Z00.00 MEDICARE ANNUAL WELLNESS VISIT, SUBSEQUENT: Primary | ICD-10-CM

## 2021-07-13 PROCEDURE — G0439 PPPS, SUBSEQ VISIT: HCPCS | Performed by: INTERNAL MEDICINE

## 2021-07-13 PROCEDURE — G9717 DOC PT DX DEP/BP F/U NT REQ: HCPCS | Performed by: INTERNAL MEDICINE

## 2021-07-13 PROCEDURE — G8536 NO DOC ELDER MAL SCRN: HCPCS | Performed by: INTERNAL MEDICINE

## 2021-07-13 PROCEDURE — G8417 CALC BMI ABV UP PARAM F/U: HCPCS | Performed by: INTERNAL MEDICINE

## 2021-07-13 PROCEDURE — G8427 DOCREV CUR MEDS BY ELIG CLIN: HCPCS | Performed by: INTERNAL MEDICINE

## 2021-07-13 PROCEDURE — 1101F PT FALLS ASSESS-DOCD LE1/YR: CPT | Performed by: INTERNAL MEDICINE

## 2021-07-13 RX ORDER — SOLIFENACIN SUCCINATE 5 MG/1
5 TABLET, FILM COATED ORAL
Qty: 30 TABLET | Refills: 3 | Status: SHIPPED | OUTPATIENT
Start: 2021-07-13 | End: 2022-06-03 | Stop reason: SDUPTHER

## 2021-07-13 NOTE — ACP (ADVANCE CARE PLANNING)
Advance Care Planning     General Advance Care Planning (ACP) Conversation      Date of Conversation: 7/13/2021  Conducted with: Patient with Decision Making Capacity    Healthcare Decision Maker:     Click here to complete [address] including selection of the Healthcare Decision Maker Relationship (ie \"Primary\")  Today we documented Decision Maker(s). The patient will provide ACP documents. Content/Action Overview:    Has ACP document(s) NOT on file - requested patient to provide      Length of Voluntary ACP Conversation in minutes:  <16 minutes (Non-Billable)    Emily Buck MD

## 2021-07-13 NOTE — PATIENT INSTRUCTIONS

## 2021-07-13 NOTE — PROGRESS NOTES
Ramos Byrd  Identified pt with two pt identifiers(name and ). Chief Complaint   Patient presents with   [employer] Annual Wellness Visit     RM19//// pt presents today for AWV;needs referral for hearing test appt has been scheduled with Dr. Fe Weber w/ [facility]; ankle swelling; concerns of the urinary frequency        1. Have you been to the ER, urgent care clinic since your last visit? Hospitalized since your last visit? NO    2. Have you seen or consulted any other health care providers outside of the [address] [place] [address] since your last visit? Include any pap smears or colon screening. NO      Provider notified of reason for visit, vitals and flowsheets obtained on patients.      Patient received paperwork for advance directive during previous visit but has not completed at this time     Reviewed record In preparation for visit, huddled with provider and have obtained necessary documentation      Health Maintenance Due   Topic    DTaP/Tdap/Td series (1 - Tdap)    Shingrix Vaccine Age 49> (1 of 2)    Bone Densitometry (Dexa) Screening     Medicare Yearly Exam        Wt Readings from Last 3 Encounters:   21 173 lb 6.4 oz (78.7 kg)   20 160 lb 9.6 oz (72.8 kg)   20 153 lb (69.4 kg)     Temp Readings from Last 3 Encounters:   21 98.1 °F (36.7 °C) (Oral)   20 98.5 °F (36.9 °C) (Oral)   20 97.7 °F (36.5 °C) (Oral)     BP Readings from Last 3 Encounters:   21 (!) 150/83   20 (!) 165/80   20 142/55     Pulse Readings from Last 3 Encounters:   21 69   20 67   20 65     Vitals:    21 1001   BP: (!) 150/83   Pulse: 69   Resp: 12   Temp: 98.1 °F (36.7 °C)   TempSrc: Oral   SpO2: 94%   Weight: 173 lb 6.4 oz (78.7 kg)   Height: 4' 11\" (1.499 m)   PainSc:   0 - No pain         Learning Assessment:  :     Learning Assessment 2017   PRIMARY LEARNER Patient Patient   HIGHEST LEVEL OF EDUCATION - PRIMARY LEARNER  SOME COLLEGE - BARRIERS PRIMARY LEARNER HEARING -   CO-LEARNER CAREGIVER No -   PRIMARY LANGUAGE ENGLISH ENGLISH   LEARNER PREFERENCE PRIMARY LISTENING DEMONSTRATION     READING -     PICTURES -     DEMONSTRATION -   ANSWERED BY self patient   RELATIONSHIP SELF SELF       Depression Screening:  :     3 most recent PHQ Screens 7/13/2021   PHQ Not Done Active Diagnosis of Depression or Bipolar Disorder   Little interest or pleasure in doing things -   Feeling down, depressed, irritable, or hopeless -   Total Score PHQ 2 -   Trouble falling or staying asleep, or sleeping too much -   Feeling tired or having little energy -   Poor appetite, weight loss, or overeating -   Feeling bad about yourself - or that you are a failure or have let yourself or your family down -   Trouble concentrating on things such as school, work, reading, or watching TV -   Moving or speaking so slowly that other people could have noticed; or the opposite being so fidgety that others notice -   Thoughts of being better off dead, or hurting yourself in some way -   PHQ 9 Score -   How difficult have these problems made it for you to do your work, take care of your home and get along with others -       Fall Risk Assessment:  :     Fall Risk Assessment, last 12 mths 7/13/2021   Able to walk? Yes   Fall in past 12 months? 1   Do you feel unsteady? 0   Are you worried about falling 1   Is TUG test greater than 12 seconds? 0   Is the gait abnormal? 0   Number of falls in past 12 months 1   Fall with injury? 0       Abuse Screening:  :     Abuse Screening Questionnaire 10/28/2019 9/20/2019 7/16/2019 4/16/2019 5/30/2018 1/30/2018 4/24/2017   Do you ever feel afraid of your partner? N N N N N N N   Are you in a relationship with someone who physically or mentally threatens you? N N N N N N N   Is it safe for you to go home?  Y Y Y Y Y Y Y       ADL Screening:  :     ADL Assessment 5/30/2018   Feeding yourself No Help Needed   Getting from bed to chair No Help Needed Getting dressed No Help Needed   Bathing or showering No Help Needed   Walk across the room (includes cane/walker) No Help Needed   Using the telphone No Help Needed   Taking your medications No Help Needed   Preparing meals No Help Needed   Managing money (expenses/bills) No Help Needed   Moderately strenuous housework (laundry) No Help Needed   Shopping for personal items (toiletries/medicines) No Help Needed   Shopping for groceries No Help Needed   Driving No Help Needed   Climbing a flight of stairs No Help Needed   Getting to places beyond walking distances No Help Needed         Medication reconciliation up to date and corrected with patient at this time.

## 2021-07-13 NOTE — PROGRESS NOTES
Jacques Cunningham is a 80 y.o. female who presents today for Annual Wellness Visit (RM19//// pt presents today for AWV;needs referral for hearing test appt has been scheduled with Dr. Missy Mckeon w/ [facility]; ankle swelling; concerns of the urinary frequency )  . 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 Today patient is here for CPE. she does have other concerns. We will be turning 89 this month. Hypertension-systolic blood pressure borderline high. Diastolic borderline low. Patient very rarely feeling dizzy. Does have some lower extremity edema which is relatively well controlled with Aldactone. reports that she has never smoked. She has never used smokeless tobacco.    reports no history of alcohol use. BP Readings from Last 2 Encounters:   07/13/21 136/68   09/29/20 (!) 165/80     Paranoia: Patient has been on Zyprexa for some time. Overall sleep is doing okay. Increased OAB in the evening and at night. Getting up 2-3 times per night. Urgency. Acting her quality of sleep. I am concerned that her lower extremity edema will worsen again if we stop her Aldactone. She does take this in the morning. We discussed options including lifestyle changes versus trial of medication. She would like to try medication. COVID: contracted this in the fall. Has been vaccinated. Health maintenance hx includes:  Exercise: moderately active. Form of exercise: quite active.     Diet: generally follows a low fat low cholesterol diet  Social: living at [employer]. Living independently. Independent of ADL's, daughter doing finances. Screening:    Colon cancer screening:N/A   Breast cancer screening: N/A   Cervical cancer screening: N/A   Osteoporosis screening:  N/A      Immunizations:     Immunization History   Administered Date(s) Administered    (RETIRED) Pneumococcal Vaccine (Unspecified Type) 05/14/2001    COVID-19, PFIZER, MRNA, LNP-S, PF, 30MCG/0.3ML DOSE 02/22/2021, 03/22/2021    Influenza High Dose Vaccine PF 10/27/2016, 10/23/2017    Influenza Vaccine 09/25/2013    Influenza Vaccine (Tri) Adjuvanted (>65 Yrs FLUAD TRI [id number]) 10/16/2018, 10/28/2019    Influenza Vaccine Split 11/10/2010, 11/17/2011, 09/19/2012    Influenza, High-dose, Quadrivalent (>65 Yrs Fluzone High Dose Quad [id number]) 02/01/2015, 10/28/2019    Influenza, Quadrivalent, Adjuvanted (>65 Yrs FLUAD QUAD [id number]) 09/29/2020    Pneumococcal Conjugate (PCV-13) 10/27/2016    Pneumococcal Polysaccharide (PPSV-23) 05/14/2001      Immunization status: up to date and documented. ROS  Review of Systems   Constitutional: Negative for chills, fever and weight loss. HENT: Negative for congestion and sore throat. Eyes: Negative for blurred vision, double vision and photophobia. Respiratory: Negative for cough and shortness of breath. Cardiovascular: Negative for chest pain, palpitations and leg swelling. Gastrointestinal: Negative for abdominal pain, constipation, diarrhea, heartburn, nausea and vomiting. Genitourinary: Negative for dysuria, frequency and urgency. Musculoskeletal: Positive for falls. Negative for joint pain and myalgias. Skin: Negative for rash. Neurological: Negative. Negative for headaches. Endo/Heme/Allergies: Does not bruise/bleed easily. Psychiatric/Behavioral: Negative for memory loss and suicidal ideas.        Visit Vitals  /68   Pulse 69   Temp 98.1 °F (36.7 °C) (Oral)   Resp 12   Ht 4' 11\" (1.499 m)   Wt 173 lb 6.4 oz (78.7 kg)   SpO2 94%   BMI 35.02 kg/m²       Physical Exam  Constitutional:       General: She is not in acute distress. Appearance: She is well-developed. HENT:      Head: Normocephalic and atraumatic. Neck:      Thyroid: No thyromegaly. Cardiovascular:      Rate and Rhythm: Normal rate and regular rhythm. Heart sounds: No murmur heard. Pulmonary:      Effort: Pulmonary effort is normal.      Breath sounds: Normal breath sounds. No wheezing. Abdominal:      General: Bowel sounds are normal. There is no distension. Palpations: Abdomen is soft. Musculoskeletal:      Cervical back: Normal range of motion and neck supple. Skin:     General: Skin is warm and dry. Neurological:      Mental Status: She is alert and oriented to person, place, and time. Cranial Nerves: No cranial nerve deficit. Psychiatric:         Behavior: Behavior normal.           Current Outpatient Medications   Medication Sig    solifenacin (VESICARE) 5 mg tablet Take 1 Tablet by mouth nightly.  OLANZapine (ZyPREXA) 2.5 mg tablet TAKE ONE TABLET BY MOUTH EVERY NIGHT    spironolactone (ALDACTONE) 50 mg tablet TAKE ONE TABLET BY MOUTH ONCE DAILY    Vitamin D2 1,250 mcg (50,000 unit) capsule TAKE ONE CAPSULE BY MOUTH ONCE A WEEK    latanoprost (XALATAN) 0.005 % ophthalmic solution     docusate sodium (COLACE) 100 mg capsule Take 100 mg by mouth two (2) times daily as needed for Constipation.  polyethylene glycol (MIRALAX) 17 gram packet Take 17g every other day as needed (Patient not taking: Reported on 7/13/2021)    triamcinolone acetonide (KENALOG) 0.025 % topical cream Apply  to affected area daily as needed for Skin Irritation. use thin layer (Patient not taking: Reported on 7/13/2021)     No current facility-administered medications for this visit.         Past Medical History:   Diagnosis Date    Depression     \"better since moving into my daughters mother-in-law suite\"   Amanda Frederick DJD (degenerative joint disease), lumbar     GERD (gastroesophageal reflux disease)     stricture.  Glaucoma     [place] (hard of hearing)     HTN (hypertension)     Keratoconjunctivitis     sicca    Macular degeneration     macular degeneration vs ooptic neuropathy    ABAD (obstructive sleep apnea)     decided not to use d/t frequent bathroom trips at night. didn't help    Osteopenia     Venous insufficiency 10/10/2011      Past Surgical History:   Procedure Laterality Date    EGD  2004    HX CHOLECYSTECTOMY  07/2018    HX COLONOSCOPY  2004    HX ENDOSCOPY  2004    HX HYSTERECTOMY      HX ORTHOPAEDIC  2005    cyst/tumor left hip    HX ORTHOPAEDIC  2014    Right Total Hip    Dr. Duran Alvarez J-W    HX PACEMAKER  11/04/2011    [facility], Dr Raulito Belle OP    HX PACEMAKER      OR BREAST SURGERY PROCEDURE UNLISTED  30 yrs ago    Breast Lumpectomy (side?)    STRESS TEST LEXISCAN/CARDIOLITE  10/3/11    normal perfusion, EF 72%      Social History     Tobacco Use    Smoking status: Never Smoker    Smokeless tobacco: Never Used   Substance Use Topics    Alcohol use: No      Family History   Problem Relation Age of Onset    Heart Failure Mother     Psychiatric Disorder Mother     Heart Disease Father     Stroke Brother     Psychiatric Disorder Other         aunt        Allergies   Allergen Reactions    Meperidine Hives     Other reaction(s): Hives    Ace Inhibitors Unknown (comments)    Codeine Unknown (comments)    Miralax [Polyethylene Glycol 3350] Nausea and Vomiting    Other Plant, Animal, Environmental Rash     Bilateral Hearing Aids cause severe reaction per pt report    Percocet [Oxycodone-Acetaminophen] Unknown (comments)    Statins-Hmg-Coa Reductase Inhibitors Unknown (comments)    Sular [Nisoldipine] Rash    Zetia [Ezetimibe] Unknown (comments)        Assessment/Plan  Diagnoses and all orders for this visit:    1.  Medicare annual wellness visit, subsequent- Russell Armas was counseled on age-appropriate/ guideline-based risk prevention behaviors and screening for a 80y.o. year old   female . We also discussed adjustments in screening based on family history if necessary. Printed instructions for preventative screening guidelines and healthy behaviors given to patient with after visit summary.    -     REFERRAL TO ENT-OTOLARYNGOLOGY    2. Severe obesity (BMI 35.0-35.9 with comorbidity) ([facility])-weight has trended up again. Blood pressure borderline.  -     CBC WITH AUTOMATED DIFF; Future  -     METABOLIC PANEL, COMPREHENSIVE; Future    3. Essential hypertension-relatively stable. Does have occasional dizziness. Would not be more aggressive with blood pressure control at this time. Am concerned that if we stopped her diuretic her lower extremities would swell worse. This may be mildly worsening her overactive bladder.  -     CBC WITH AUTOMATED DIFF; Future  -     METABOLIC PANEL, COMPREHENSIVE; Future    4. Psychophysiological insomnia-stable with current therapy mental health doing well    5. Depression, unspecified depression type-     6. Vitamin D deficiency  -     VITAMIN D, 25 HYDROXY; Future    7. Decreased hearing of both ears-we will be seeing audiologist soon.  -     REFERRAL TO ENT-OTOLARYNGOLOGY    8. OAB (overactive bladder)-had a long discussion regarding side effects medication. She will try this low-dose Vesicare starting at bedtime. If she benefits from this without any side effects we can continue. We will get blood work done 3 months after starting medication. -     solifenacin (VESICARE) 5 mg tablet; Take 1 Tablet by mouth nightly. -     METABOLIC PANEL, BASIC; Future            Ashwin Patten MD  7/13/2021    This note was created with the help of speech recognition software Valora Galeazzi) and may contain some 'sound alike' errors.    This is the Subsequent Medicare Annual Wellness Exam, performed 12 months or more after the Initial AWV or the last Subsequent MER    I have reviewed the patient's medical history in detail and updated the computerized patient record. Assessment/Plan   Education and counseling provided:  Are appropriate based on today's review and evaluation  End-of-Life planning (with patient's consent)  Pneumococcal Vaccine  Colorectal cancer screening tests  Bone mass measurement (DEXA)    1. Medicare annual wellness visit, subsequent  -     REFERRAL TO ENT-OTOLARYNGOLOGY  2. Severe obesity (BMI 35.0-35.9 with comorbidity) ([facility] [address]  -     CBC WITH AUTOMATED DIFF; Future  -     METABOLIC PANEL, COMPREHENSIVE; Future  3. Essential hypertension  -     CBC WITH AUTOMATED DIFF; Future  -     METABOLIC PANEL, COMPREHENSIVE; Future  4. Psychophysiological insomnia  5. Depression, unspecified depression type  6. Vitamin D deficiency  -     VITAMIN D, 25 HYDROXY; Future  7. Decreased hearing of both ears  -     REFERRAL TO ENT-OTOLARYNGOLOGY  8. OAB (overactive bladder)  -     solifenacin (VESICARE) 5 mg tablet;  Take 1 Tablet by mouth nightly., Normal, Disp-30 Tablet, R-3  -     METABOLIC PANEL, BASIC; Future       Depression Risk Factor Screening     3 most recent PHQ Screens 7/13/2021   PHQ Not Done Active Diagnosis of Depression or Bipolar Disorder   Little interest or pleasure in doing things -   Feeling down, depressed, irritable, or hopeless -   Total Score PHQ 2 -   Trouble falling or staying asleep, or sleeping too much -   Feeling tired or having little energy -   Poor appetite, weight loss, or overeating -   Feeling bad about yourself - or that you are a failure or have let yourself or your family down -   Trouble concentrating on things such as school, work, reading, or watching TV -   Moving or speaking so slowly that other people could have noticed; or the opposite being so fidgety that others notice -   Thoughts of being better off dead, or hurting yourself in some way -   PHQ 9 Score -   How difficult have these problems made it for you to do your work, take care of your home and get along with others -       Alcohol Risk Screen    Do you average more than 1 drink per night or more than 7 drinks a week:  No    On any one occasion in the past three months have you have had more than 3 drinks containing alcohol:  No        Functional Ability and Level of Safety    Hearing: The patient wears hearing aids. Activities of Daily Living: The home contains: handrails and grab bars  Patient needs help with:  transportation, shopping and walking      Ambulation: with difficulty, uses a walker     Fall Risk:  Fall Risk Assessment, last 12 mths 7/13/2021   Able to walk? Yes   Fall in past 12 months? 1   Do you feel unsteady? 0   Are you worried about falling 1   Is TUG test greater than 12 seconds? 0   Is the gait abnormal? 0   Number of falls in past 12 months 1   Fall with injury?  0      Abuse Screen:  Patient is not abused       Cognitive Screening    Has your family/caregiver stated any concerns about your memory: no    Health Maintenance Due     Health Maintenance Due   Topic Date Due    DTaP/Tdap/Td series (1 - Tdap) Never done    Shingrix Vaccine Age 50> (1 of 2) Never done       Patient Care Team   Patient Care Team:  Merryl Epley, MD as PCP - General (Internal Medicine)  Merryl Epley, MD as PCP - [facility] Provider  Olinda Reddy MD (Cardiology)  Lawanda Clifton RN as Ambulatory Care Manager  Luiza Quinn MD (Pulmonary Disease)  Dash Hastings MD (Gastroenterology)  Suni Roth MD (Cardiology)  Siena Marroquin MD (Hematology and Oncology)  Riley Crain MD (General Surgery)    History     Patient Active Problem List   Diagnosis Code    ABAD (obstructive sleep apnea) G47.33    DJD (degenerative joint disease), lumbar M47.816    Macular degeneration H35.30    Essential hypertension I10    Depression F32.9    Psychophysiological insomnia F51.04    Glaucoma H40.9    Presence of permanent cardiac pacemaker Z95.0    Otalgia of both ears H92.03    Pseudophakia Z96.1    Drug-induced glaucoma H40.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  Glaucoma     [place] (hard of hearing)     HTN (hypertension)     Keratoconjunctivitis     sicca    Macular degeneration     macular degeneration vs ooptic neuropathy    ABAD (obstructive sleep apnea)     decided not to use d/t frequent bathroom trips at night. didn't help    Osteopenia     Venous insufficiency 10/10/2011      Past Surgical History:   Procedure Laterality Date    EGD  2004    HX CHOLECYSTECTOMY  07/2018    HX COLONOSCOPY  2004    HX ENDOSCOPY  2004    HX HYSTERECTOMY      HX ORTHOPAEDIC  2005    cyst/tumor left hip    HX ORTHOPAEDIC  2014    Right Total Hip    Dr. Mary Paris JYaritzaW    HX PACEMAKER  11/04/2011    [facility], Dr Estrada Tenorio OP    HX PACEMAKER      SC BREAST SURGERY PROCEDURE UNLISTED  30 yrs ago    Breast Lumpectomy (side?)    STRESS TEST LEXISCAN/CARDIOLITE  10/3/11    normal perfusion, EF 72%     Current Outpatient Medications   Medication Sig Dispense Refill    solifenacin (VESICARE) 5 mg tablet Take 1 Tablet by mouth nightly. 30 Tablet 3    OLANZapine (ZyPREXA) 2.5 mg tablet TAKE ONE TABLET BY MOUTH EVERY NIGHT 90 Tablet 1    spironolactone (ALDACTONE) 50 mg tablet TAKE ONE TABLET BY MOUTH ONCE DAILY 30 Tab 3    Vitamin D2 1,250 mcg (50,000 unit) capsule TAKE ONE CAPSULE BY MOUTH ONCE A WEEK 12 Cap 3    latanoprost (XALATAN) 0.005 % ophthalmic solution       docusate sodium (COLACE) 100 mg capsule Take 100 mg by mouth two (2) times daily as needed for Constipation.       polyethylene glycol (MIRALAX) 17 gram packet Take 17g every other day as needed (Patient not taking: Reported on 7/13/2021) 30 Each 4    triamcinolone acetonide (KENALOG) 0.025 % topical cream Apply  to affected area daily as needed for Skin Irritation.  use thin layer (Patient not taking: Reported on 7/13/2021) 15 g 0     Allergies   Allergen Reactions    Meperidine Hives     Other reaction(s): Hives    Ace Inhibitors Unknown (comments)    Codeine Unknown (comments)    Miralax [Polyethylene Glycol 3350] Nausea and Vomiting    Other Plant, Animal, Environmental Rash     Bilateral Hearing Aids cause severe reaction per pt report    Percocet [Oxycodone-Acetaminophen] Unknown (comments)    Statins-Hmg-Coa Reductase Inhibitors Unknown (comments)    Sular [Nisoldipine] Rash    Zetia [Ezetimibe] Unknown (comments)       Family History   Problem Relation Age of Onset    Heart Failure Mother     Psychiatric Disorder Mother     Heart Disease Father     Stroke Brother     Psychiatric Disorder Other         aunt     Social History     Tobacco Use    Smoking status: Never Smoker    Smokeless tobacco: Never Used   Substance Use Topics    Alcohol use: No         Jorge Membreno MD

## 2021-07-14 LAB
25(OH)D3 SERPL-MCNC: 87.5 NG/ML (ref 30–100)
ALBUMIN SERPL-MCNC: 3.8 G/DL (ref 3.5–5)
ALBUMIN/GLOB SERPL: 1.4 {RATIO} (ref 1.1–2.2)
ALP SERPL-CCNC: 79 U/L (ref 45–117)
ALT SERPL-CCNC: 11 U/L (ref 12–78)
ANION GAP SERPL CALC-SCNC: 6 MMOL/L (ref 5–15)
AST SERPL-CCNC: 12 U/L (ref 15–37)
BASOPHILS # BLD: 0 K/UL (ref 0–0.1)
BASOPHILS NFR BLD: 1 % (ref 0–1)
BILIRUB SERPL-MCNC: 0.5 MG/DL (ref 0.2–1)
BUN SERPL-MCNC: 16 MG/DL (ref 6–20)
BUN/CREAT SERPL: 18 (ref 12–20)
CALCIUM SERPL-MCNC: 9.4 MG/DL (ref 8.5–10.1)
CHLORIDE SERPL-SCNC: 111 MMOL/L (ref 97–108)
CO2 SERPL-SCNC: 25 MMOL/L (ref 21–32)
CREAT SERPL-MCNC: 0.87 MG/DL (ref 0.55–1.02)
DIFFERENTIAL METHOD BLD: NORMAL
EOSINOPHIL # BLD: 0.2 K/UL (ref 0–0.4)
EOSINOPHIL NFR BLD: 3 % (ref 0–7)
ERYTHROCYTE [DISTWIDTH] IN BLOOD BY AUTOMATED COUNT: 13.7 % (ref 11.5–14.5)
GLOBULIN SER CALC-MCNC: 2.8 G/DL (ref 2–4)
GLUCOSE SERPL-MCNC: 89 MG/DL (ref 65–100)
HCT VFR BLD AUTO: 42.5 % (ref 35–47)
HGB BLD-MCNC: 13.8 G/DL (ref 11.5–16)
IMM GRANULOCYTES # BLD AUTO: 0 K/UL (ref 0–0.04)
IMM GRANULOCYTES NFR BLD AUTO: 0 % (ref 0–0.5)
LYMPHOCYTES # BLD: 1.5 K/UL (ref 0.8–3.5)
LYMPHOCYTES NFR BLD: 22 % (ref 12–49)
MCH RBC QN AUTO: 28.3 PG (ref 26–34)
MCHC RBC AUTO-ENTMCNC: 32.5 G/DL (ref 30–36.5)
MCV RBC AUTO: 87.3 FL (ref 80–99)
MONOCYTES # BLD: 0.9 K/UL (ref 0–1)
MONOCYTES NFR BLD: 13 % (ref 5–13)
NEUTS SEG # BLD: 4.2 K/UL (ref 1.8–8)
NEUTS SEG NFR BLD: 61 % (ref 32–75)
NRBC # BLD: 0 K/UL (ref 0–0.01)
NRBC BLD-RTO: 0 PER 100 WBC
PLATELET # BLD AUTO: 208 K/UL (ref 150–400)
PMV BLD AUTO: 12.1 FL (ref 8.9–12.9)
POTASSIUM SERPL-SCNC: 4.7 MMOL/L (ref 3.5–5.1)
PROT SERPL-MCNC: 6.6 G/DL (ref 6.4–8.2)
RBC # BLD AUTO: 4.87 M/UL (ref 3.8–5.2)
SODIUM SERPL-SCNC: 142 MMOL/L (ref 136–145)
WBC # BLD AUTO: 6.9 K/UL (ref 3.6–11)

## 2021-07-31 NOTE — PROGRESS NOTES
Pt does not wear cpap at home There are no Wet Read(s) to document. There is 1 Wet Read(s) to document.

## 2021-09-10 DIAGNOSIS — I10 ESSENTIAL HYPERTENSION: ICD-10-CM

## 2021-09-10 RX ORDER — SPIRONOLACTONE 50 MG/1
TABLET, FILM COATED ORAL
Qty: 30 TABLET | Refills: 3 | Status: SHIPPED | OUTPATIENT
Start: 2021-09-10 | End: 2021-12-09

## 2021-11-04 LAB
BUN SERPL-MCNC: 18 MG/DL (ref 10–36)
BUN/CREAT SERPL: 20 (ref 12–28)
CALCIUM SERPL-MCNC: 9.1 MG/DL (ref 8.7–10.3)
CHLORIDE SERPL-SCNC: 109 MMOL/L (ref 96–106)
CO2 SERPL-SCNC: 20 MMOL/L (ref 20–29)
CREAT SERPL-MCNC: 0.91 MG/DL (ref 0.57–1)
GLUCOSE SERPL-MCNC: 89 MG/DL (ref 65–99)
INTERPRETATION: NORMAL
POTASSIUM SERPL-SCNC: 4.7 MMOL/L (ref 3.5–5.2)
SODIUM SERPL-SCNC: 143 MMOL/L (ref 134–144)

## 2021-11-05 NOTE — PROGRESS NOTES
Please inform patient and her family that blood work looks good. Continue Vesicare as long as it is benefiting her.

## 2021-12-09 DIAGNOSIS — I10 ESSENTIAL HYPERTENSION: ICD-10-CM

## 2021-12-09 RX ORDER — SPIRONOLACTONE 50 MG/1
TABLET, FILM COATED ORAL
Qty: 30 TABLET | Refills: 3 | Status: SHIPPED | OUTPATIENT
Start: 2021-12-09 | End: 2022-03-11

## 2021-12-21 DIAGNOSIS — F51.04 PSYCHOPHYSIOLOGICAL INSOMNIA: ICD-10-CM

## 2021-12-21 DIAGNOSIS — F32.A DEPRESSION, UNSPECIFIED DEPRESSION TYPE: ICD-10-CM

## 2021-12-21 RX ORDER — OLANZAPINE 2.5 MG/1
TABLET ORAL
Qty: 90 TABLET | Refills: 1 | Status: SHIPPED | OUTPATIENT
Start: 2021-12-21 | End: 2022-06-07

## 2022-03-05 DIAGNOSIS — E55.9 VITAMIN D DEFICIENCY: ICD-10-CM

## 2022-03-05 RX ORDER — ERGOCALCIFEROL 1.25 MG/1
CAPSULE ORAL
Qty: 12 CAPSULE | Refills: 3 | Status: SHIPPED | OUTPATIENT
Start: 2022-03-05

## 2022-03-11 DIAGNOSIS — I10 ESSENTIAL HYPERTENSION: ICD-10-CM

## 2022-03-11 RX ORDER — SPIRONOLACTONE 50 MG/1
TABLET, FILM COATED ORAL
Qty: 90 TABLET | Refills: 3 | Status: SHIPPED | OUTPATIENT
Start: 2022-03-11

## 2022-03-18 PROBLEM — Z95.0 PRESENCE OF PERMANENT CARDIAC PACEMAKER: Status: ACTIVE | Noted: 2019-04-16

## 2022-03-19 PROBLEM — R82.81 PYURIA: Status: ACTIVE | Noted: 2019-08-20

## 2022-03-19 PROBLEM — H40.60X0 DRUG-INDUCED GLAUCOMA: Status: ACTIVE | Noted: 2017-06-06

## 2022-03-19 PROBLEM — T50.905A DRUG-INDUCED GLAUCOMA: Status: ACTIVE | Noted: 2017-06-06

## 2022-03-19 PROBLEM — S22.49XA RIB FRACTURES: Status: ACTIVE | Noted: 2019-08-20

## 2022-03-20 PROBLEM — H92.03 OTALGIA OF BOTH EARS: Status: ACTIVE | Noted: 2019-04-16

## 2022-03-20 PROBLEM — F51.04 PSYCHOPHYSIOLOGICAL INSOMNIA: Status: ACTIVE | Noted: 2017-12-27

## 2022-06-01 ENCOUNTER — NURSE TRIAGE (OUTPATIENT)
Dept: OTHER | Facility: CLINIC | Age: 87
End: 2022-06-01

## 2022-06-01 NOTE — TELEPHONE ENCOUNTER
Received call from [facility] with Red Flag Complaint. Subjective: Caller states \"ankle swelling\"     Current Symptoms: ankle swelling just ankle and feet, has had this in the past denies sob or cp moves well hx htn and arthritis no redness, also having issues with urination having trouble making it to br and  having some leakage, was on medication for this in the past    Onset: couple weeks    Associated Symptoms: NA    Pain Severity:  Hurts at night    Temperature: none    What has been tried:     LMP: NA Pregnant: NA    Recommended disposition: See PCP within 24 Hours    Care advice provided, patient verbalizes understanding; denies any other questions or concerns; instructed to call back for any new or worsening symptoms. Patient/Caller agrees with recommended disposition; writer provided warm transfer to [facility] for appointment scheduling    Attention Provider: Thank you for allowing me to participate in the care of your patient. The patient was connected to triage in response to information provided to the E[facility].   Please do not respond through this encounter as the response is not directed to a    Reason for Disposition   [1] Very swollen ankle AND [2] no fever    Protocols used: ANKLE SWELLING-ADULT-AH

## 2022-06-03 ENCOUNTER — HOSPITAL ENCOUNTER (OUTPATIENT)
Dept: GENERAL RADIOLOGY | Age: 87
Discharge: HOME | End: 2022-06-03
Payer: MEDICARE

## 2022-06-03 ENCOUNTER — OFFICE VISIT (OUTPATIENT)
Dept: INTERNAL MEDICINE CLINIC | Age: 87
End: 2022-06-03
Payer: MEDICARE

## 2022-06-03 VITALS
WEIGHT: 177 LBS | SYSTOLIC BLOOD PRESSURE: 134 MMHG | OXYGEN SATURATION: 95 % | BODY MASS INDEX: 35.68 KG/M2 | TEMPERATURE: 98.4 F | HEART RATE: 77 BPM | HEIGHT: 59 IN | DIASTOLIC BLOOD PRESSURE: 71 MMHG | RESPIRATION RATE: 16 BRPM

## 2022-06-03 DIAGNOSIS — M79.672 LEFT FOOT PAIN: ICD-10-CM

## 2022-06-03 DIAGNOSIS — N32.81 OAB (OVERACTIVE BLADDER): ICD-10-CM

## 2022-06-03 DIAGNOSIS — M79.672 LEFT FOOT PAIN: Primary | ICD-10-CM

## 2022-06-03 DIAGNOSIS — I10 ESSENTIAL HYPERTENSION: ICD-10-CM

## 2022-06-03 DIAGNOSIS — E66.01 SEVERE OBESITY (BMI 35.0-39.9) WITH COMORBIDITY (HCC): ICD-10-CM

## 2022-06-03 LAB
ALBUMIN SERPL-MCNC: 3.4 G/DL (ref 3.5–5)
ALBUMIN/GLOB SERPL: 1.1 {RATIO} (ref 1.1–2.2)
ALP SERPL-CCNC: 74 U/L (ref 45–117)
ALT SERPL-CCNC: 9 U/L (ref 12–78)
ANION GAP SERPL CALC-SCNC: 4 MMOL/L (ref 5–15)
AST SERPL-CCNC: 9 U/L (ref 15–37)
BASOPHILS # BLD: 0.1 K/UL (ref 0–0.1)
BASOPHILS NFR BLD: 1 % (ref 0–1)
BILIRUB SERPL-MCNC: 0.4 MG/DL (ref 0.2–1)
BUN SERPL-MCNC: 20 MG/DL (ref 6–20)
BUN/CREAT SERPL: 21 (ref 12–20)
CALCIUM SERPL-MCNC: 9.3 MG/DL (ref 8.5–10.1)
CHLORIDE SERPL-SCNC: 113 MMOL/L (ref 97–108)
CO2 SERPL-SCNC: 26 MMOL/L (ref 21–32)
CREAT SERPL-MCNC: 0.96 MG/DL (ref 0.55–1.02)
DIFFERENTIAL METHOD BLD: ABNORMAL
EOSINOPHIL # BLD: 0.3 K/UL (ref 0–0.4)
EOSINOPHIL NFR BLD: 3 % (ref 0–7)
ERYTHROCYTE [DISTWIDTH] IN BLOOD BY AUTOMATED COUNT: 19.2 % (ref 11.5–14.5)
GLOBULIN SER CALC-MCNC: 3.1 G/DL (ref 2–4)
GLUCOSE SERPL-MCNC: 72 MG/DL (ref 65–100)
HCT VFR BLD AUTO: 39 % (ref 35–47)
HGB BLD-MCNC: 11.3 G/DL (ref 11.5–16)
IMM GRANULOCYTES # BLD AUTO: 0 K/UL (ref 0–0.04)
IMM GRANULOCYTES NFR BLD AUTO: 0 % (ref 0–0.5)
LYMPHOCYTES # BLD: 2 K/UL (ref 0.8–3.5)
LYMPHOCYTES NFR BLD: 24 % (ref 12–49)
MCH RBC QN AUTO: 21.9 PG (ref 26–34)
MCHC RBC AUTO-ENTMCNC: 29 G/DL (ref 30–36.5)
MCV RBC AUTO: 75.4 FL (ref 80–99)
MONOCYTES # BLD: 1.3 K/UL (ref 0–1)
MONOCYTES NFR BLD: 15 % (ref 5–13)
NEUTS SEG # BLD: 4.8 K/UL (ref 1.8–8)
NEUTS SEG NFR BLD: 57 % (ref 32–75)
NRBC # BLD: 0 K/UL (ref 0–0.01)
NRBC BLD-RTO: 0 PER 100 WBC
PLATELET # BLD AUTO: 250 K/UL (ref 150–400)
POTASSIUM SERPL-SCNC: 4.8 MMOL/L (ref 3.5–5.1)
PROT SERPL-MCNC: 6.5 G/DL (ref 6.4–8.2)
RBC # BLD AUTO: 5.17 M/UL (ref 3.8–5.2)
SODIUM SERPL-SCNC: 143 MMOL/L (ref 136–145)
WBC # BLD AUTO: 8.4 K/UL (ref 3.6–11)

## 2022-06-03 PROCEDURE — G8427 DOCREV CUR MEDS BY ELIG CLIN: HCPCS | Performed by: INTERNAL MEDICINE

## 2022-06-03 PROCEDURE — 99214 OFFICE O/P EST MOD 30 MIN: CPT | Performed by: INTERNAL MEDICINE

## 2022-06-03 PROCEDURE — G0463 HOSPITAL OUTPT CLINIC VISIT: HCPCS | Performed by: INTERNAL MEDICINE

## 2022-06-03 PROCEDURE — G8417 CALC BMI ABV UP PARAM F/U: HCPCS | Performed by: INTERNAL MEDICINE

## 2022-06-03 PROCEDURE — 73630 X-RAY EXAM OF FOOT: CPT

## 2022-06-03 PROCEDURE — 1101F PT FALLS ASSESS-DOCD LE1/YR: CPT | Performed by: INTERNAL MEDICINE

## 2022-06-03 PROCEDURE — G9717 DOC PT DX DEP/BP F/U NT REQ: HCPCS | Performed by: INTERNAL MEDICINE

## 2022-06-03 PROCEDURE — G8536 NO DOC ELDER MAL SCRN: HCPCS | Performed by: INTERNAL MEDICINE

## 2022-06-03 PROCEDURE — 1090F PRES/ABSN URINE INCON ASSESS: CPT | Performed by: INTERNAL MEDICINE

## 2022-06-03 RX ORDER — SOLIFENACIN SUCCINATE 5 MG/1
5 TABLET, FILM COATED ORAL
Qty: 90 TABLET | Refills: 3 | Status: SHIPPED | OUTPATIENT
Start: 2022-06-03 | End: 2022-07-22

## 2022-06-03 NOTE — PROGRESS NOTES
Spoke with daughter ren who voiced understanding of results and will contact office if any more symptoms occur.

## 2022-06-03 NOTE — PROGRESS NOTES
Venkatesh Byrne is a 80 y.o. female who presents today for Foot Swelling (RM18// pt presents today for foot and ankle swelling, (L) foot toes are painful; urinary frequency)  . She has a history of   Patient Active Problem List   Diagnosis Code    ABAD (obstructive sleep apnea) G47.33    DJD (degenerative joint disease), lumbar M47.816    Macular degeneration H35.30    Essential hypertension I10    Depression F32. 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 Today patient is here for an acute visit. .     I have not seen her in almost a year    OAB: Last year we had tried to start her on Vesicare due to urinary frequency at night. Given her history, I was apprehensive about stopping Aldactone. Looking at outside records it does not seem that she is still taking this medication. This was stopped due to: ??.  Reports that this was helping. Hypertension- edema has been a bit worse. In the morning better. Pain to L foot. Pain seems to be worse when swelling occurs. Denies any injury to the foot. Does report that she sits for a good period of the day. Warmer weather has also likely contributed to some lower extremity edema. Denies any shortness of breath that is new and no shortness of breath when lying down. Hypertension ROS: taking medications as instructed, no medication side effects noted, no TIA's, no chest pain on exertion, no dyspnea on exertion     reports that she has never smoked. She has never used smokeless tobacco.    reports no history of alcohol use. BP Readings from Last 2 Encounters:   06/03/22 134/71   07/13/21 136/68     Sleep has been a bit worse due to urinary issues. She continues to take Zyprexa at night.     ROS  Review of Systems Constitutional: Negative for chills, fever and weight loss. HENT: Negative for congestion and sore throat. Eyes: Negative for blurred vision, double vision and photophobia. Respiratory: Negative for cough and shortness of breath. Cardiovascular: Positive for leg swelling. Negative for chest pain and palpitations. Gastrointestinal: Negative for abdominal pain, constipation, diarrhea, heartburn, nausea and vomiting. Genitourinary: Positive for urgency. Negative for dysuria and frequency. Musculoskeletal: Positive for joint pain. Negative for myalgias. Skin: Negative for rash. Neurological: Negative. Negative for headaches. Endo/Heme/Allergies: Does not bruise/bleed easily. Psychiatric/Behavioral: Negative for memory loss and suicidal ideas. Visit Vitals  /71 (BP 1 Location: Left upper arm, BP Patient Position: Sitting, BP Cuff Size: Large adult)   Pulse 77   Temp 98.4 °F (36.9 °C) (Oral)   Resp 16   Ht 4' 11\" (1.499 m)   Wt 177 lb (80.3 kg)   SpO2 95%   BMI 35.75 kg/m²       Physical Exam  Constitutional:       General: She is not in acute distress. Appearance: She is well-developed. HENT:      Head: Normocephalic and atraumatic. Neck:      Thyroid: No thyromegaly. Cardiovascular:      Rate and Rhythm: Normal rate and regular rhythm. Heart sounds: No murmur heard. Pulmonary:      Effort: Pulmonary effort is normal.      Breath sounds: Normal breath sounds. No wheezing. Abdominal:      General: Bowel sounds are normal. There is no distension. Palpations: Abdomen is soft. Musculoskeletal:      Cervical back: Normal range of motion and neck supple. Comments: Mild to moderate pitting edema to legs and ankles to bilateral legs. Pain to distal aspect of left foot but no obvious deformity. No bruising. Skin:     General: Skin is warm and dry. Neurological:      Mental Status: She is alert and oriented to person, place, and time.       Cranial Nerves: No cranial nerve deficit. Psychiatric:         Behavior: Behavior normal.           Current Outpatient Medications   Medication Sig    spironolactone (ALDACTONE) 50 mg tablet TAKE ONE TABLET BY MOUTH ONCE DAILY    Vitamin D2 1,250 mcg (50,000 unit) capsule TAKE ONE CAPSULE BY MOUTH EVERY WEEK    OLANZapine (ZyPREXA) 2.5 mg tablet TAKE ONE TABLET BY MOUTH EVERY NIGHT    solifenacin (VESICARE) 5 mg tablet Take 1 Tablet by mouth nightly.  docusate sodium (COLACE) 100 mg capsule Take 100 mg by mouth two (2) times daily as needed for Constipation.  polyethylene glycol (MIRALAX) 17 gram packet Take 17g every other day as needed (Patient not taking: Reported on 7/13/2021)    triamcinolone acetonide (KENALOG) 0.025 % topical cream Apply  to affected area daily as needed for Skin Irritation. use thin layer (Patient not taking: Reported on 7/13/2021)     No current facility-administered medications for this visit. Past Medical History:   Diagnosis Date    Depression     \"better since moving into my daughters mother-in-law suite\"    DJD (degenerative joint disease), lumbar     GERD (gastroesophageal reflux disease)     stricture.  Glaucoma     [place] (hard of hearing)     HTN (hypertension)     Keratoconjunctivitis     sicca    Macular degeneration     macular degeneration vs ooptic neuropathy    ABAD (obstructive sleep apnea)     decided not to use d/t frequent bathroom trips at night.  didn't help    Osteopenia     Venous insufficiency 10/10/2011      Past Surgical History:   Procedure Laterality Date    EGD  2004    HX CHOLECYSTECTOMY  07/2018    HX COLONOSCOPY  2004    HX ENDOSCOPY  2004    HX HYSTERECTOMY      HX ORTHOPAEDIC  2005    cyst/tumor left hip    HX ORTHOPAEDIC  2014    Right Total Hip    Dr. Claritza RUANOW    HX PACEMAKER  11/04/2011    [facility], Dr Elif Hernández OP    HX PACEMAKER      MS BREAST SURGERY PROCEDURE UNLISTED  30 yrs ago    Breast Lumpectomy (side?)  STRESS TEST LEXISCAN/CARDIOLITE  10/3/11    normal perfusion, EF 72%      Social History     Tobacco Use    Smoking status: Never Smoker    Smokeless tobacco: Never Used   Substance Use Topics    Alcohol use: No      Family History   Problem Relation Age of Onset    Heart Failure Mother     Psychiatric Disorder Mother     Heart Disease Father     Stroke Brother     Psychiatric Disorder Other         aunt        Allergies   Allergen Reactions    Meperidine Hives     Other reaction(s): Hives    Ace Inhibitors Unknown (comments)    Codeine Unknown (comments)    Miralax [Polyethylene Glycol 3350] Nausea and Vomiting    Other Plant, Animal, Environmental Rash     Bilateral Hearing Aids cause severe reaction per pt report    Percocet [Oxycodone-Acetaminophen] Unknown (comments)    Statins-Hmg-Coa Reductase Inhibitors Unknown (comments)    Sular [Nisoldipine] Rash    Zetia [Ezetimibe] Unknown (comments)        Assessment/Plan  Diagnoses and all orders for this visit:    1. Left foot pain-potentially due to swelling. Patient to check x-ray today. Increased swelling likely due to warmer weather and sitting. Patient to try to put on compression stockings. Given the low diastolic blood pressure and hesitant to increase spironolactone dose. If foot pain persists with improvements of swelling and x-rays negative she will let us know  -     XR FOOT LT MIN 3 V; Future    2. OAB (overactive bladder)-did benefit from this and unclear why this was stopped. -     solifenacin (VESICARE) 5 mg tablet; Take 1 Tablet by mouth nightly. 3. Severe obesity (BMI 35.0-39. 9) with comorbidity ([address]    4. Essential hypertension-continue current therapy  -     METABOLIC PANEL, COMPREHENSIVE; Future  -     CBC WITH AUTOMATED DIFF; Future            Merle Enamorado MD  6/3/2022    This note was created with the help of speech recognition software Tiff Townsend) and may contain some 'sound alike' errors.

## 2022-06-03 NOTE — PROGRESS NOTES
Edu Daniels  Identified pt with two pt identifiers(name and ). Chief Complaint   Patient presents with    Foot Swelling     RM18// pt presents today for foot and ankle swelling, (L) foot toes are painful; urinary frequency       1. Have you been to the ER, urgent care clinic since your last visit? Hospitalized since your last visit? NO    2. Have you seen or consulted any other health care providers outside of the [address] [place] [address] since your last visit? Include any pap smears or colon screening. NO      Provider notified of reason for visit, vitals and flowsheets obtained on patients.      Patient received paperwork for advance directive during previous visit but has not completed at this time     Reviewed record In preparation for visit, huddled with provider and have obtained necessary documentation      Health Maintenance Due   Topic    Depression Monitoring     DTaP/Tdap/Td series (1 - Tdap)    Shingrix Vaccine Age 49> (1 of 2)    COVID-19 Vaccine (3 - Booster for Singh Peter series)       Wt Readings from Last 3 Encounters:   22 177 lb (80.3 kg)   21 173 lb 6.4 oz (78.7 kg)   20 160 lb 9.6 oz (72.8 kg)     Temp Readings from Last 3 Encounters:   22 98.4 °F (36.9 °C) (Oral)   21 98.1 °F (36.7 °C) (Oral)   20 98.5 °F (36.9 °C) (Oral)     BP Readings from Last 3 Encounters:   22 134/71   21 136/68   20 (!) 165/80     Pulse Readings from Last 3 Encounters:   22 77   21 69   20 67     Vitals:    22 0745   BP: 134/71   Pulse: 77   Resp: 16   Temp: 98.4 °F (36.9 °C)   TempSrc: Oral   SpO2: 95%   Weight: 177 lb (80.3 kg)   Height: 4' 11\" (1.499 m)   PainSc:   5   PainLoc: Foot         Learning Assessment:  :     Learning Assessment 2017   PRIMARY LEARNER Patient Patient   HIGHEST LEVEL OF EDUCATION - PRIMARY LEARNER  SOME COLLEGE -   BARRIERS PRIMARY LEARNER HEARING -   CO-LEARNER CAREGIVER No -   PRIMARY LANGUAGE ENGLISH ENGLISH   LEARNER PREFERENCE PRIMARY LISTENING DEMONSTRATION     READING -     PICTURES -     DEMONSTRATION -   ANSWERED BY self patient   RELATIONSHIP SELF SELF       Depression Screening:  :     3 most recent PHQ Screens 6/3/2022   PHQ Not Done -   Little interest or pleasure in doing things Not at all   Feeling down, depressed, irritable, or hopeless Not at all   Total Score PHQ 2 0   Trouble falling or staying asleep, or sleeping too much -   Feeling tired or having little energy -   Poor appetite, weight loss, or overeating -   Feeling bad about yourself - or that you are a failure or have let yourself or your family down -   Trouble concentrating on things such as school, work, reading, or watching TV -   Moving or speaking so slowly that other people could have noticed; or the opposite being so fidgety that others notice -   Thoughts of being better off dead, or hurting yourself in some way -   PHQ 9 Score -   How difficult have these problems made it for you to do your work, take care of your home and get along with others -       Fall Risk Assessment:  :     Fall Risk Assessment, last 12 mths 7/13/2021   Able to walk? Yes   Fall in past 12 months? 1   Do you feel unsteady? 0   Are you worried about falling 1   Is TUG test greater than 12 seconds? 0   Is the gait abnormal? 0   Number of falls in past 12 months 1   Fall with injury? 0       Abuse Screening:  :     Abuse Screening Questionnaire 10/28/2019 9/20/2019 7/16/2019 4/16/2019 5/30/2018 1/30/2018 4/24/2017   Do you ever feel afraid of your partner? N N N N N N N   Are you in a relationship with someone who physically or mentally threatens you? N N N N N N N   Is it safe for you to go home?  Y Y Y Y Y Y Y       ADL Screening:  :     ADL Assessment 5/30/2018   Feeding yourself No Help Needed   Getting from bed to chair No Help Needed   Getting dressed No Help Needed   Bathing or showering No Help Needed   Walk across the room (includes cane/walker) No Help Needed   Using the telphone No Help Needed   Taking your medications No Help Needed   Preparing meals No Help Needed   Managing money (expenses/bills) No Help Needed   Moderately strenuous housework (laundry) No Help Needed   Shopping for personal items (toiletries/medicines) No Help Needed   Shopping for groceries No Help Needed   Driving No Help Needed   Climbing a flight of stairs No Help Needed   Getting to places beyond walking distances No Help Needed         Medication reconciliation up to date and corrected with patient at this time.

## 2022-06-05 DIAGNOSIS — F51.04 PSYCHOPHYSIOLOGICAL INSOMNIA: ICD-10-CM

## 2022-06-05 DIAGNOSIS — F32.A DEPRESSION, UNSPECIFIED DEPRESSION TYPE: ICD-10-CM

## 2022-06-07 RX ORDER — OLANZAPINE 2.5 MG/1
TABLET ORAL
Qty: 90 TABLET | Refills: 1 | Status: SHIPPED | OUTPATIENT
Start: 2022-06-07 | End: 2022-09-21

## 2022-06-14 DIAGNOSIS — D50.9 MICROCYTIC ANEMIA: ICD-10-CM

## 2022-06-14 DIAGNOSIS — E53.8 B12 DEFICIENCY: ICD-10-CM

## 2022-06-14 DIAGNOSIS — D64.9 ANEMIA, UNSPECIFIED TYPE: Primary | ICD-10-CM

## 2022-06-14 NOTE — PROGRESS NOTES
Please inform family that she has a mild anemia. I want to repeat some blood work at the [facility] and see her in about 4 weeks. Please schedule this.

## 2022-07-16 LAB
BASOPHILS # BLD AUTO: 0.1 X10E3/UL (ref 0–0.2)
BASOPHILS NFR BLD AUTO: 1 %
EOSINOPHIL # BLD AUTO: 0.2 X10E3/UL (ref 0–0.4)
EOSINOPHIL NFR BLD AUTO: 2 %
ERYTHROCYTE [DISTWIDTH] IN BLOOD BY AUTOMATED COUNT: 16.1 % (ref 11.7–15.4)
FERRITIN SERPL-MCNC: 9 NG/ML (ref 15–150)
HCT VFR BLD AUTO: 37.9 % (ref 34–46.6)
HGB BLD-MCNC: 11.5 G/DL (ref 11.1–15.9)
IMM GRANULOCYTES # BLD AUTO: 0 X10E3/UL (ref 0–0.1)
IMM GRANULOCYTES NFR BLD AUTO: 0 %
IRON SATN MFR SERPL: 6 % (ref 15–55)
IRON SERPL-MCNC: 26 UG/DL (ref 27–139)
LYMPHOCYTES # BLD AUTO: 1.3 X10E3/UL (ref 0.7–3.1)
LYMPHOCYTES NFR BLD AUTO: 17 %
MCH RBC QN AUTO: 22 PG (ref 26.6–33)
MCHC RBC AUTO-ENTMCNC: 30.3 G/DL (ref 31.5–35.7)
MCV RBC AUTO: 73 FL (ref 79–97)
MONOCYTES # BLD AUTO: 0.9 X10E3/UL (ref 0.1–0.9)
MONOCYTES NFR BLD AUTO: 11 %
NEUTROPHILS # BLD AUTO: 5.2 X10E3/UL (ref 1.4–7)
NEUTROPHILS NFR BLD AUTO: 69 %
PLATELET # BLD AUTO: 250 X10E3/UL (ref 150–450)
RBC # BLD AUTO: 5.22 X10E6/UL (ref 3.77–5.28)
RETICS/RBC NFR AUTO: 0.9 % (ref 0.6–2.6)
TIBC SERPL-MCNC: 433 UG/DL (ref 250–450)
UIBC SERPL-MCNC: 407 UG/DL (ref 118–369)
VIT B12 SERPL-MCNC: 296 PG/ML (ref 232–1245)
WBC # BLD AUTO: 7.6 X10E3/UL (ref 3.4–10.8)

## 2022-07-22 ENCOUNTER — OFFICE VISIT (OUTPATIENT)
Dept: INTERNAL MEDICINE CLINIC | Age: 87
End: 2022-07-22
Payer: MEDICARE

## 2022-07-22 VITALS
OXYGEN SATURATION: 95 % | HEIGHT: 59 IN | RESPIRATION RATE: 18 BRPM | DIASTOLIC BLOOD PRESSURE: 73 MMHG | TEMPERATURE: 98.3 F | SYSTOLIC BLOOD PRESSURE: 135 MMHG | BODY MASS INDEX: 35.76 KG/M2 | HEART RATE: 68 BPM | WEIGHT: 177.4 LBS

## 2022-07-22 DIAGNOSIS — K44.9 HIATAL HERNIA: ICD-10-CM

## 2022-07-22 DIAGNOSIS — I10 ESSENTIAL HYPERTENSION: ICD-10-CM

## 2022-07-22 DIAGNOSIS — N32.81 OAB (OVERACTIVE BLADDER): ICD-10-CM

## 2022-07-22 DIAGNOSIS — D50.8 OTHER IRON DEFICIENCY ANEMIA: Primary | ICD-10-CM

## 2022-07-22 PROCEDURE — G8427 DOCREV CUR MEDS BY ELIG CLIN: HCPCS | Performed by: INTERNAL MEDICINE

## 2022-07-22 PROCEDURE — G8536 NO DOC ELDER MAL SCRN: HCPCS | Performed by: INTERNAL MEDICINE

## 2022-07-22 PROCEDURE — G8417 CALC BMI ABV UP PARAM F/U: HCPCS | Performed by: INTERNAL MEDICINE

## 2022-07-22 PROCEDURE — 1101F PT FALLS ASSESS-DOCD LE1/YR: CPT | Performed by: INTERNAL MEDICINE

## 2022-07-22 PROCEDURE — 99214 OFFICE O/P EST MOD 30 MIN: CPT | Performed by: INTERNAL MEDICINE

## 2022-07-22 PROCEDURE — G0463 HOSPITAL OUTPT CLINIC VISIT: HCPCS | Performed by: INTERNAL MEDICINE

## 2022-07-22 PROCEDURE — G9717 DOC PT DX DEP/BP F/U NT REQ: HCPCS | Performed by: INTERNAL MEDICINE

## 2022-07-22 PROCEDURE — 1090F PRES/ABSN URINE INCON ASSESS: CPT | Performed by: INTERNAL MEDICINE

## 2022-07-22 RX ORDER — OMEPRAZOLE 20 MG/1
20 CAPSULE, DELAYED RELEASE ORAL DAILY
Qty: 90 CAPSULE | Refills: 1 | Status: SHIPPED | OUTPATIENT
Start: 2022-07-22

## 2022-07-22 NOTE — PROGRESS NOTES
Sourav Jain is a 80 y.o. female who presents today for Follow-up (RM19// pt presenting today to review recent labwork; is concerned with the fluid still in left foot; urinary frequency is better but still going every 3 hours)  . She has a history of   Patient Active Problem List   Diagnosis Code    ABAD (obstructive sleep apnea) G47.33    DJD (degenerative joint disease), lumbar M47.816    Macular degeneration H35.30    Essential hypertension I10    Depression F32. 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 Today patient is here for f/u. Anemia: Patient seems at developed a mild iron deficiency anemia. She does have a history of iron deficiency anemia and was actually hospitalized in 2012 for this. Endoscopy did show hiatal hernia and chronic changes that could have been the cause of bleeding. Patient reports recently no signs of bleeding. Reports NO dark stools. Her blood counts seem to have stabilized but her MCV is still low. We discussed getting her a small amount of supplemental iron and resuming a PPI as she has been on one in the past.  Denies any palpitations or feeling poorly. Hypertension  Hypertension ROS: taking medications as instructed, no medication side effects noted, no TIA's, no chest pain on exertion, no dyspnea on exertion, no swelling of ankles     reports that she has never smoked. She has never used smokeless tobacco.    reports no history of alcohol use. BP Readings from Last 2 Encounters:   07/22/22 135/73   06/03/22 134/71     OAB: Patient notes that she does not feel much difference with the Vesicare. She will stop this. We also discussed that there is a higher dose in case she wants to return and tried again at the 10 mg dose. Denies any side effects with this    ROS  Review of Systems   Constitutional:  Negative for chills, fever and weight loss. HENT:  Negative for congestion and sore throat. Eyes:  Negative for blurred vision, double vision and photophobia. Respiratory:  Negative for cough and shortness of breath. Cardiovascular:  Negative for chest pain, palpitations and leg swelling (mild and stable). Gastrointestinal:  Negative for abdominal pain, constipation, diarrhea, heartburn, nausea and vomiting. Genitourinary:  Negative for dysuria, frequency and urgency. Musculoskeletal:  Negative for joint pain and myalgias. Skin:  Negative for rash. Neurological: Negative. Negative for headaches. Endo/Heme/Allergies:  Does not bruise/bleed easily. Psychiatric/Behavioral:  Positive for memory loss. Negative for suicidal ideas. Visit Vitals  /73 (BP 1 Location: Left upper arm, BP Patient Position: Sitting, BP Cuff Size: Large adult)   Pulse 68   Temp 98.3 °F (36.8 °C) (Oral)   Resp 18   Ht 4' 11\" (1.499 m)   Wt 177 lb 6.4 oz (80.5 kg)   SpO2 95%   BMI 35.83 kg/m²       Physical Exam  Constitutional:       Appearance: She is well-developed. HENT:      Head: Normocephalic and atraumatic. Cardiovascular:      Rate and Rhythm: Normal rate and regular rhythm. Heart sounds: No murmur heard. Pulmonary:      Effort: Pulmonary effort is normal. No respiratory distress. Skin:     General: Skin is warm and dry. Neurological:      Mental Status: She is alert and oriented to person, place, and time. Psychiatric:         Behavior: Behavior normal.         Current Outpatient Medications   Medication Sig    omeprazole (PRILOSEC) 20 mg capsule Take 1 Capsule by mouth in the morning. ferrous sulfate (SLOW FE) 142 mg (45 mg iron) ER tablet Take 1 Tablet by mouth Daily (before breakfast).     OLANZapine (ZyPREXA) 2.5 mg tablet TAKE ONE TABLET BY MOUTH EVERY NIGHT    spironolactone (ALDACTONE) 50 mg tablet TAKE ONE TABLET BY MOUTH ONCE DAILY    Vitamin D2 1,250 mcg (50,000 unit) capsule TAKE ONE CAPSULE BY MOUTH EVERY WEEK    polyethylene glycol (MIRALAX) 17 gram packet Take 17g every other day as needed    docusate sodium (COLACE) 100 mg capsule Take 100 mg by mouth two (2) times daily as needed for Constipation. No current facility-administered medications for this visit. Past Medical History:   Diagnosis Date    Depression     \"better since moving into my daughters mother-in-law suite\"    DJD (degenerative joint disease), lumbar     GERD (gastroesophageal reflux disease)     stricture. Glaucoma     [place] (hard of hearing)     HTN (hypertension)     Keratoconjunctivitis     sicca    Macular degeneration     macular degeneration vs ooptic neuropathy    ABAD (obstructive sleep apnea)     decided not to use d/t frequent bathroom trips at night.  didn't help    Osteopenia     Venous insufficiency 10/10/2011      Past Surgical History:   Procedure Laterality Date    EGD  2004    HX CHOLECYSTECTOMY  07/2018    HX COLONOSCOPY  2004    HX ENDOSCOPY  2004    HX HYSTERECTOMY      HX ORTHOPAEDIC  2005    cyst/tumor left hip    HX ORTHOPAEDIC  2014    Right Total Hip    Dr. Jovany Ford [facility]    HX PACEMAKER  11/04/2011    [facility], Dr Jason Espinoza OP    HX PACEMAKER      ND BREAST [address]  30 yrs ago    Breast Lumpectomy (side?)    STRESS TEST LEXISCAN/CARDIOLITE  10/3/11    normal perfusion, EF 72%      Social History     Tobacco Use    Smoking status: Never    Smokeless tobacco: Never   Substance Use Topics    Alcohol use: No      Family History   Problem Relation Age of Onset    Heart Failure Mother     Psychiatric Disorder Mother     Heart Disease Father     Stroke Brother     Psychiatric Disorder Other         aunt        Allergies   Allergen Reactions    Meperidine Hives     Other reaction(s): Hives    Ace Inhibitors Unknown (comments)    Codeine Unknown (comments)    Miralax [Polyethylene Glycol 3350] Nausea and Vomiting    Other Plant, Animal, Environmental Rash     Bilateral Hearing Aids cause severe reaction per pt report    Percocet [Oxycodone-Acetaminophen] Unknown (comments)    Statins-Hmg-Coa Reductase Inhibitors Unknown (comments)    Sular [Nisoldipine] Rash    Zetia [Ezetimibe] Unknown (comments)        Assessment/Plan  Diagnoses and all orders for this visit:    1. Other iron deficiency anemia-mild and not having any symptoms. Hemoglobin is stable. We will place her on supplemental iron along with a PPI given her history of hiatal hernias. She will get her blood work done in 2 months. As long as this is stable and improving we could stop her oral supplementation of iron. -     omeprazole (PRILOSEC) 20 mg capsule; Take 1 Capsule by mouth in the morning.  -     ferrous sulfate (SLOW FE) 142 mg (45 mg iron) ER tablet; Take 1 Tablet by mouth Daily (before breakfast). -     METABOLIC PANEL, BASIC; Future  -     CBC WITH AUTOMATED DIFF; Future  -     FERRITIN; Future  -     IRON PROFILE; Future    2. Hiatal hernia  -     omeprazole (PRILOSEC) 20 mg capsule; Take 1 Capsule by mouth in the morning.  -     ferrous sulfate (SLOW FE) 142 mg (45 mg iron) ER tablet; Take 1 Tablet by mouth Daily (before breakfast). -     METABOLIC PANEL, BASIC; Future  -     CBC WITH AUTOMATED DIFF; Future  -     FERRITIN; Future  -     IRON PROFILE; Future    3. OAB (overactive bladder)-does not think that this has made much difference. She will stop it and let us know if symptoms get worse. 4. Essential hypertension-continue current therapy          Erwin Ventura MD  7/22/2022    This note was created with the help of speech recognition software Genna Kidney) and may contain some 'sound alike' errors.

## 2022-07-22 NOTE — PROGRESS NOTES
Eden Ryan  Identified pt with two pt identifiers(name and ). Chief Complaint   Patient presents with    Follow-up     RM19// pt presenting today to review recent labwork; is concerned with the fluid still in left foot; urinary frequency is better but still going every 3 hours       1. Have you been to the ER, urgent care clinic since your last visit? Hospitalized since your last visit? NO    2. Have you seen or consulted any other health care providers outside of the [address] [place] [address] since your last visit? Include any pap smears or colon screening. NO      Provider notified of reason for visit, vitals and flowsheets obtained on patients.      Patient received paperwork for advance directive during previous visit but has not completed at this time     Reviewed record In preparation for visit, huddled with provider and have obtained necessary documentation      Health Maintenance Due   Topic    DTaP/Tdap/Td series (1 - Tdap)    Shingrix Vaccine Age 50> (1 of 2)    Bone Densitometry (Dexa) Screening     COVID-19 Vaccine (3 - Booster for Pfizer series)    Medicare Yearly Exam        Wt Readings from Last 3 Encounters:   22 177 lb 6.4 oz (80.5 kg)   22 177 lb (80.3 kg)   21 173 lb 6.4 oz (78.7 kg)     Temp Readings from Last 3 Encounters:   22 98.3 °F (36.8 °C) (Oral)   22 98.4 °F (36.9 °C) (Oral)   21 98.1 °F (36.7 °C) (Oral)     BP Readings from Last 3 Encounters:   22 135/73   22 134/71   21 136/68     Pulse Readings from Last 3 Encounters:   22 68   22 77   21 69     Vitals:    22 1006   BP: 135/73   Pulse: 68   Resp: 18   Temp: 98.3 °F (36.8 °C)   TempSrc: Oral   SpO2: 95%   Weight: 177 lb 6.4 oz (80.5 kg)   Height: 4' 11\" (1.499 m)   PainSc:   0 - No pain         Learning Assessment:  :     Learning Assessment 2017   PRIMARY LEARNER Patient Patient   HIGHEST LEVEL OF EDUCATION - PRIMARY LEARNER  [address] PRIMARY LEARNER HEARING -   CO-LEARNER CAREGIVER No -   PRIMARY LANGUAGE ENGLISH ENGLISH   LEARNER PREFERENCE PRIMARY LISTENING DEMONSTRATION     READING -     PICTURES -     DEMONSTRATION -   ANSWERED BY self patient   RELATIONSHIP SELF SELF       Depression Screening:  :     3 most recent PHQ Screens 6/3/2022   PHQ Not Done -   Little interest or pleasure in doing things Not at all   Feeling down, depressed, irritable, or hopeless Not at all   Total Score PHQ 2 0   Trouble falling or staying asleep, or sleeping too much -   Feeling tired or having little energy -   Poor appetite, weight loss, or overeating -   Feeling bad about yourself - or that you are a failure or have let yourself or your family down -   Trouble concentrating on things such as school, work, reading, or watching TV -   Moving or speaking so slowly that other people could have noticed; or the opposite being so fidgety that others notice -   Thoughts of being better off dead, or hurting yourself in some way -   PHQ 9 Score -   How difficult have these problems made it for you to do your work, take care of your home and get along with others -       Fall Risk Assessment:  :     Fall Risk Assessment, last 12 mths 7/13/2021   Able to walk? Yes   Fall in past 12 months? 1   Do you feel unsteady? 0   Are you worried about falling 1   Is TUG test greater than 12 seconds? 0   Is the gait abnormal? 0   Number of falls in past 12 months 1   Fall with injury? 0       Abuse Screening:  :     Abuse Screening Questionnaire 10/28/2019 9/20/2019 7/16/2019 4/16/2019 5/30/2018 1/30/2018 4/24/2017   Do you ever feel afraid of your partner? N N N N N N N   Are you in a relationship with someone who physically or mentally threatens you? N N N N N N N   Is it safe for you to go home?  Y Y Y Y Y Y Y       ADL Screening:  :     ADL Assessment 5/30/2018   Feeding yourself No Help Needed   Getting from bed to chair No Help Needed   Getting dressed No Help Needed   Bathing or showering No Help Needed   Walk across the room (includes cane/walker) No Help Needed   Using the telphone No Help Needed   Taking your medications No Help Needed   Preparing meals No Help Needed   Managing money (expenses/bills) No Help Needed   Moderately strenuous housework (laundry) No Help Needed   Shopping for personal items (toiletries/medicines) No Help Needed   Shopping for groceries No Help Needed   Driving No Help Needed   Climbing a flight of stairs No Help Needed   Getting to places beyond walking distances No Help Needed         Medication reconciliation up to date and corrected with patient at this time.

## 2022-09-09 DIAGNOSIS — K44.9 HIATAL HERNIA: ICD-10-CM

## 2022-09-09 DIAGNOSIS — D50.8 OTHER IRON DEFICIENCY ANEMIA: ICD-10-CM

## 2022-09-09 RX ORDER — UREA 10 %
LOTION (ML) TOPICAL
Qty: 30 TABLET | Refills: 2 | Status: SHIPPED | OUTPATIENT
Start: 2022-09-09

## 2022-09-21 DIAGNOSIS — F32.A DEPRESSION, UNSPECIFIED DEPRESSION TYPE: ICD-10-CM

## 2022-09-21 DIAGNOSIS — F51.04 PSYCHOPHYSIOLOGICAL INSOMNIA: ICD-10-CM

## 2022-09-21 RX ORDER — OLANZAPINE 2.5 MG/1
TABLET ORAL
Qty: 90 TABLET | Refills: 1 | Status: SHIPPED | OUTPATIENT
Start: 2022-09-21

## 2022-10-15 LAB
BASOPHILS # BLD AUTO: 0.1 X10E3/UL (ref 0–0.2)
BASOPHILS NFR BLD AUTO: 1 %
BUN SERPL-MCNC: 16 MG/DL (ref 10–36)
BUN/CREAT SERPL: 17 (ref 12–28)
CALCIUM SERPL-MCNC: 9.6 MG/DL (ref 8.7–10.3)
CHLORIDE SERPL-SCNC: 107 MMOL/L (ref 96–106)
CO2 SERPL-SCNC: 20 MMOL/L (ref 20–29)
CREAT SERPL-MCNC: 0.93 MG/DL (ref 0.57–1)
EGFR: 58 ML/MIN/1.73
EOSINOPHIL # BLD AUTO: 0.2 X10E3/UL (ref 0–0.4)
EOSINOPHIL NFR BLD AUTO: 3 %
ERYTHROCYTE [DISTWIDTH] IN BLOOD BY AUTOMATED COUNT: 18.2 % (ref 11.7–15.4)
FERRITIN SERPL-MCNC: 33 NG/ML (ref 15–150)
GLUCOSE SERPL-MCNC: 81 MG/DL (ref 70–99)
HCT VFR BLD AUTO: 45.3 % (ref 34–46.6)
HGB BLD-MCNC: 14.3 G/DL (ref 11.1–15.9)
IMM GRANULOCYTES # BLD AUTO: 0 X10E3/UL (ref 0–0.1)
IMM GRANULOCYTES NFR BLD AUTO: 0 %
INTERPRETATION: NORMAL
IRON SATN MFR SERPL: 27 % (ref 15–55)
IRON SERPL-MCNC: 94 UG/DL (ref 27–139)
LYMPHOCYTES # BLD AUTO: 1.4 X10E3/UL (ref 0.7–3.1)
LYMPHOCYTES NFR BLD AUTO: 19 %
MCH RBC QN AUTO: 25.4 PG (ref 26.6–33)
MCHC RBC AUTO-ENTMCNC: 31.6 G/DL (ref 31.5–35.7)
MCV RBC AUTO: 80 FL (ref 79–97)
MONOCYTES # BLD AUTO: 0.9 X10E3/UL (ref 0.1–0.9)
MONOCYTES NFR BLD AUTO: 13 %
NEUTROPHILS # BLD AUTO: 4.7 X10E3/UL (ref 1.4–7)
NEUTROPHILS NFR BLD AUTO: 64 %
PLATELET # BLD AUTO: 216 X10E3/UL (ref 150–450)
POTASSIUM SERPL-SCNC: 5.2 MMOL/L (ref 3.5–5.2)
RBC # BLD AUTO: 5.64 X10E6/UL (ref 3.77–5.28)
SODIUM SERPL-SCNC: 142 MMOL/L (ref 134–144)
TIBC SERPL-MCNC: 345 UG/DL (ref 250–450)
UIBC SERPL-MCNC: 251 UG/DL (ref 118–369)
WBC # BLD AUTO: 7.3 X10E3/UL (ref 3.4–10.8)

## 2022-10-17 NOTE — PROGRESS NOTES
Patient notified of lab results and voiced understanding of stopping iron and continue omeprazole and will repeat labs at next visit in [place].

## 2022-12-02 DIAGNOSIS — K44.9 HIATAL HERNIA: ICD-10-CM

## 2022-12-02 DIAGNOSIS — D50.8 OTHER IRON DEFICIENCY ANEMIA: ICD-10-CM

## 2022-12-02 RX ORDER — ACETAMINOPHEN 500 MG
TABLET ORAL
Qty: 30 TABLET | Refills: 2 | Status: SHIPPED | OUTPATIENT
Start: 2022-12-02

## 2022-12-02 RX ORDER — OMEPRAZOLE 20 MG/1
CAPSULE, DELAYED RELEASE ORAL
Qty: 90 CAPSULE | Refills: 1 | Status: SHIPPED | OUTPATIENT
Start: 2022-12-02

## 2023-01-10 DIAGNOSIS — D50.8 OTHER IRON DEFICIENCY ANEMIA: ICD-10-CM

## 2023-01-10 DIAGNOSIS — I10 ESSENTIAL HYPERTENSION: Primary | ICD-10-CM

## 2023-01-25 NOTE — PROGRESS NOTES
Ronda Carvajal is a 80 y.o. female who presents today for Annual Wellness Visit  . She has a history of   Patient Active Problem List   Diagnosis Code    ABAD (obstructive sleep apnea) G47.33    DJD (degenerative joint disease), lumbar M47.816    Macular degeneration H35.30    Essential hypertension I10    Depression F32. 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 Today patient is here for CPE. Iron levels and blood counts are stable. Has stopped a PPI for unknown reasons. We discussed that we can back off iron supplementation to 3 times weekly. Hypertension- well controlled  Hypertension ROS: taking medications as instructed, no medication side effects noted, no TIA's, no chest pain on exertion, no dyspnea on exertion, no swelling of ankles     reports that she has never smoked. She has never used smokeless tobacco.    reports no history of alcohol use. BP Readings from Last 2 Encounters:   01/26/23 132/82   07/22/22 135/73     Depression/Insomnia: mood is overall stable. Still on zyprexa. Still some insomnia, but higher doses of Zyprexa leads to feeling foggy. OAB has been stable. Not much help with vesicare. Daughter notes some generalized weakness overall. She is somewhat deconditioned on a physical standpoint. Patient does have physical therapist available to her. We will place an order for her    Health maintenance hx includes:  Exercise: moderately active. Form of exercise: not much    Diet: generally follows a low fat low cholesterol diet  Social: living at [facility]. Living independently. Independent of ADL's, daughter doing finances.      Screening:               Colon cancer screening:N/A              Breast cancer screening: N/A              Cervical cancer screening: N/A              Osteoporosis screening:  N/A      Immunizations:     Immunization History   Administered Date(s) Administered     Influenza, FLUZONE (age 72 y+), High Dose 02/01/2015, 10/28/2019    (RETIRED) Pneumococcal Vaccine (Unspecified Type) 05/14/2001    COVID-19, PFIZER PURPLE top, DILUTE for use, (age 15 y+), IM, 30mcg/0.3mL 02/01/2021, 02/22/2021, 11/16/2021    Influenza High Dose Vaccine PF 10/27/2016, 10/23/2017, 11/25/2022    Influenza Vaccine 09/25/2013    Influenza Vaccine (Tri) Adjuvanted (>65 Yrs FLUAD TRI 06747) 10/16/2018, 10/28/2019    Influenza Vaccine Split 11/10/2010, 11/17/2011, 09/19/2012    Influenza, FLUAD, (age 72 y+), Adjuvanted 09/29/2020    Pneumococcal Conjugate (PCV-13) 10/27/2016    Pneumococcal Polysaccharide (PPSV-23) 05/14/2001      Immunization status: up to date and documented. ROS  Review of Systems   Constitutional:  Negative for chills, fever and weight loss. HENT:  Negative for congestion and sore throat. Eyes:  Negative for blurred vision, double vision and photophobia. Respiratory:  Negative for cough and shortness of breath. Cardiovascular:  Negative for chest pain, palpitations and leg swelling. Gastrointestinal:  Negative for abdominal pain, constipation, diarrhea, heartburn, nausea and vomiting. Genitourinary:  Negative for dysuria, frequency and urgency. Musculoskeletal:  Negative for joint pain and myalgias. Skin:  Negative for rash. Neurological: Negative. Negative for headaches. Endo/Heme/Allergies:  Does not bruise/bleed easily. Psychiatric/Behavioral:  Negative for memory loss and suicidal ideas. Visit Vitals  /82   Pulse 67   Temp 97.8 °F (36.6 °C) (Oral)   Resp 16   Ht 4' 11\" (1.499 m)   Wt 168 lb 3.2 oz (76.3 kg)   SpO2 95%   BMI 33.97 kg/m²       Physical Exam  Constitutional:       Appearance: She is well-developed. HENT:      Head: Normocephalic and atraumatic. Ears:      Comments: Mild eczema to bilateral ears. Hearing aids present to both ears. Cardiovascular:      Rate and Rhythm: Normal rate and regular rhythm. Heart sounds: No murmur heard. Pulmonary:      Effort: Pulmonary effort is normal. No respiratory distress. Skin:     General: Skin is warm and dry. Neurological:      Mental Status: She is alert and oriented to person, place, and time. Psychiatric:         Behavior: Behavior normal.         Current Outpatient Medications   Medication Sig    omeprazole (PRILOSEC) 20 mg capsule TAKE ONE CAPSULE BY MOUTH EVERY MORNING    ferrous sulfate (Slow Release Iron) 143 mg (45 mg iron) TbER TAKE ONE TABLET BY MOUTH ON M/W/F. OLANZapine (ZyPREXA) 2.5 mg tablet TAKE ONE TABLET BY MOUTH EVERY NIGHT    spironolactone (ALDACTONE) 50 mg tablet TAKE ONE TABLET BY MOUTH ONCE DAILY    Vitamin D2 1,250 mcg (50,000 unit) capsule TAKE ONE CAPSULE BY MOUTH EVERY WEEK    polyethylene glycol (MIRALAX) 17 gram packet Take 17g every other day as needed    docusate sodium (COLACE) 100 mg capsule Take 100 mg by mouth two (2) times daily as needed for Constipation. No current facility-administered medications for this visit. Past Medical History:   Diagnosis Date    Depression     \"better since moving into my daughters mother-in-law suite\"    DJD (degenerative joint disease), lumbar     GERD (gastroesophageal reflux disease)     stricture. Glaucoma     [place] (hard of hearing)     HTN (hypertension)     Keratoconjunctivitis     sicca    Macular degeneration     macular degeneration vs ooptic neuropathy    ABAD (obstructive sleep apnea)     decided not to use d/t frequent bathroom trips at night.  didn't help    Osteopenia     Venous insufficiency 10/10/2011      Past Surgical History:   Procedure Laterality Date    EGD  2004    HX CHOLECYSTECTOMY  07/2018    HX COLONOSCOPY  2004    HX ENDOSCOPY  2004    HX HYSTERECTOMY      HX ORTHOPAEDIC  2005    cyst/tumor left hip HX ORTHOPAEDIC  2014    Right Total Hip    Dr. Matt Barton [facility]    HX PACEMAKER  11/04/2011    Riddhi ZAMORA, Dr Festus Copeland [facility]    HX PACEMAKER      IN UNLISTED PROCEDURE BREAST  30 yrs ago    Breast Lumpectomy (side?)    STRESS TEST LEXISCAN/CARDIOLITE  10/3/11    normal perfusion, EF 72%      Social History     Tobacco Use    Smoking status: Never    Smokeless tobacco: Never   Substance Use Topics    Alcohol use: No      Family History   Problem Relation Age of Onset    Heart Failure Mother     Psychiatric Disorder Mother     Heart Disease Father     Stroke Brother     Psychiatric Disorder Other         aunt        Allergies   Allergen Reactions    Meperidine Hives     Other reaction(s): Hives    Ace Inhibitors Unknown (comments)    Codeine Unknown (comments)    Miralax [Polyethylene Glycol 3350] Nausea and Vomiting    Other Plant, Animal, Environmental Rash     Bilateral Hearing Aids cause severe reaction per pt report    Percocet [Oxycodone-Acetaminophen] Unknown (comments)    Statins-Hmg-Coa Reductase Inhibitors Unknown (comments)    Sular [Nisoldipine] Rash    Zetia [Ezetimibe] Unknown (comments)        Assessment/Plan  Diagnoses and all orders for this visit:    1. Medicare annual wellness visit, subsequent- Wilhemena Fent was counseled on age-appropriate/ guideline-based risk prevention behaviors and screening for a 80y.o. year old   female . We also discussed adjustments in screening based on family history if necessary. Printed instructions for preventative screening guidelines and healthy behaviors given to patient with after visit summary. 2. Advanced directives, counseling/discussion    3. Other iron deficiency anemia-levels are stable. Patient to resume daily PPI and change iron supplementation to Monday Wednesday Friday.   We will repeat blood work in 6 months  -     omeprazole (PRILOSEC) 20 mg capsule; TAKE ONE CAPSULE BY MOUTH EVERY MORNING  -     ferrous sulfate (Slow Release Iron) 143 mg (45 mg iron) TbER; TAKE ONE TABLET BY MOUTH ON M/W/F.    4. Hiatal hernia  -     omeprazole (PRILOSEC) 20 mg capsule; TAKE ONE CAPSULE BY MOUTH EVERY MORNING  -     ferrous sulfate (Slow Release Iron) 143 mg (45 mg iron) TbER; TAKE ONE TABLET BY MOUTH ON M/W/F.    5. Essential hypertension-much better on repeat    6. Depression, unspecified depression type-mental health and sleep seems to be stable. 7. Psychophysiological insomnia    8. Weakness of both legs-patient to meet with physical therapist to help with strengthening.  -     REFERRAL TO PHYSICAL THERAPY          Ashlyn Oleary MD  1/26/2023    This note was created with the help of speech recognition software Brittni Covarrubias) and may contain some 'sound alike' errors. This is the Subsequent Medicare Annual Wellness Exam, performed 12 months or more after the Initial AWV or the last Subsequent AWV    I have reviewed the patient's medical history in detail and updated the computerized patient record. Assessment/Plan   Education and counseling provided:  Are appropriate based on today's review and evaluation  End-of-Life planning (with patient's consent)    1. Medicare annual wellness visit, subsequent  2. Advanced directives, counseling/discussion  3. Other iron deficiency anemia  -     omeprazole (PRILOSEC) 20 mg capsule; TAKE ONE CAPSULE BY MOUTH EVERY MORNING, Normal, Disp-90 Capsule, R-3  -     ferrous sulfate (Slow Release Iron) 143 mg (45 mg iron) TbER; TAKE ONE TABLET BY MOUTH ON M/W/F., Normal, Disp-30 Tablet, R-2  4. Hiatal hernia  -     omeprazole (PRILOSEC) 20 mg capsule; TAKE ONE CAPSULE BY MOUTH EVERY MORNING, Normal, Disp-90 Capsule, R-3  -     ferrous sulfate (Slow Release Iron) 143 mg (45 mg iron) TbER; TAKE ONE TABLET BY MOUTH ON M/W/F., Normal, Disp-30 Tablet, R-2  5. Essential hypertension  6. Depression, unspecified depression type  7. Psychophysiological insomnia  8.  Weakness of both legs  -     REFERRAL TO PHYSICAL THERAPY Depression Risk Factor Screening     3 most recent PHQ Screens 6/3/2022   PHQ Not Done -   Little interest or pleasure in doing things Not at all   Feeling down, depressed, irritable, or hopeless Not at all   Total Score PHQ 2 0   Trouble falling or staying asleep, or sleeping too much -   Feeling tired or having little energy -   Poor appetite, weight loss, or overeating -   Feeling bad about yourself - or that you are a failure or have let yourself or your family down -   Trouble concentrating on things such as school, work, reading, or watching TV -   Moving or speaking so slowly that other people could have noticed; or the opposite being so fidgety that others notice -   Thoughts of being better off dead, or hurting yourself in some way -   PHQ 9 Score -   How difficult have these problems made it for you to do your work, take care of your home and get along with others -       Alcohol & Drug Abuse Risk Screen    Do you average more than 1 drink per night or more than 7 drinks a week:  No    On any one occasion in the past three months have you have had more than 3 drinks containing alcohol:  No          Functional Ability and Level of Safety    Hearing: Hearing is good. Activities of Daily Living: The home contains: no safety equipment. Patient does total self care      Ambulation: with no difficulty     Fall Risk:  Fall Risk Assessment, last 12 mths 1/26/2023   Able to walk? Yes   Fall in past 12 months? 0   Do you feel unsteady? 1   Are you worried about falling 1   Is TUG test greater than 12 seconds? -   Is the gait abnormal? -   Number of falls in past 12 months -   Fall with injury?  -      Abuse Screen:  Patient is not abused       Cognitive Screening    Has your family/caregiver stated any concerns about your memory: no      Health Maintenance Due     Health Maintenance Due   Topic Date Due    DTaP/Tdap/Td series (1 - Tdap) Never done    Shingles Vaccine (1 of 2) Never done    COVID-19 Vaccine (4 - Booster for [employer] series) 01/11/2022       Patient Care Team   Patient Care Team:  Adela Call MD as PCP - General (Internal Medicine Physician)  Adela Call MD as PCP - REHABILITATION [facility] Empaneled Provider  Heather Yeboah MD (Inactive) (Cardiovascular Disease Physician)  Keanu Montalvo RN as Ambulatory Care Manager  Marina Young MD (Pulmonary Disease)  Maurizio Muñoz MD (Gastroenterology)  Ignacia Cooney MD (Cardiovascular Disease Physician)  Rui Weinberg MD (Hematology and Oncology)  Judith Webber MD (General Surgery)    History     Patient Active Problem List   Diagnosis Code    ABAD (obstructive sleep apnea) G47.33    DJD (degenerative joint disease), lumbar M47.816    Macular degeneration H35.30    Essential hypertension I10    Depression F32. 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 Glaucoma     [place] (hard of hearing)     HTN (hypertension)     Keratoconjunctivitis     sicca    Macular degeneration     macular degeneration vs ooptic neuropathy    BAAD (obstructive sleep apnea)     decided not to use d/t frequent bathroom trips at night.  didn't help    Osteopenia     Venous insufficiency 10/10/2011      Past Surgical History:   Procedure Laterality Date    EGD  2004    HX CHOLECYSTECTOMY  07/2018    HX COLONOSCOPY  2004    HX ENDOSCOPY  2004    HX HYSTERECTOMY      HX ORTHOPAEDIC  2005    cyst/tumor left hip    HX ORTHOPAEDIC  2014    Right Total Hip    Dr. Brad Ireland [facility]    HX PACEMAKER  11/04/2011    Jose ZAMORA, Dr Joy Adames PACEMAKER      [place] UNLISTED PROCEDURE BREAST  30 yrs ago    Breast Lumpectomy (side?)    STRESS TEST LEXISCAN/CARDIOLITE  10/3/11    normal perfusion, EF 72%     Current Outpatient Medications   Medication Sig Dispense Refill    omeprazole (PRILOSEC) 20 mg capsule TAKE ONE CAPSULE BY MOUTH EVERY MORNING 90 Capsule 3    ferrous sulfate (Slow Release Iron) 143 mg (45 mg iron) TbER TAKE ONE TABLET BY MOUTH ON M/W/F. 30 Tablet 2    OLANZapine (ZyPREXA) 2.5 mg tablet TAKE ONE TABLET BY MOUTH EVERY NIGHT 90 Tablet 1    spironolactone (ALDACTONE) 50 mg tablet TAKE ONE TABLET BY MOUTH ONCE DAILY 90 Tablet 3    Vitamin D2 1,250 mcg (50,000 unit) capsule TAKE ONE CAPSULE BY MOUTH EVERY WEEK 12 Capsule 3    polyethylene glycol (MIRALAX) 17 gram packet Take 17g every other day as needed 30 Each 4    docusate sodium (COLACE) 100 mg capsule Take 100 mg by mouth two (2) times daily as needed for Constipation.        Allergies   Allergen Reactions    Meperidine Hives     Other reaction(s): Hives    Ace Inhibitors Unknown (comments)    Codeine Unknown (comments)    Miralax [Polyethylene Glycol 3350] Nausea and Vomiting    Other Plant, Animal, Environmental Rash     Bilateral Hearing Aids cause severe reaction per pt report    Percocet [Oxycodone-Acetaminophen] Unknown (comments)    Statins-Hmg-Coa Reductase Inhibitors Unknown (comments)    Sular [Nisoldipine] Rash    Zetia [Ezetimibe] Unknown (comments)       Family History   Problem Relation Age of Onset    Heart Failure Mother     Psychiatric Disorder Mother     Heart Disease Father     Stroke Brother     Psychiatric Disorder Other         aunt     Social History     Tobacco Use    Smoking status: Never    Smokeless tobacco: Never   Substance Use Topics    Alcohol use: No         Stephen Ng MD

## 2023-01-26 ENCOUNTER — OFFICE VISIT (OUTPATIENT)
Dept: INTERNAL MEDICINE CLINIC | Age: 88
End: 2023-01-26
Payer: MEDICARE

## 2023-01-26 VITALS
DIASTOLIC BLOOD PRESSURE: 82 MMHG | OXYGEN SATURATION: 95 % | HEART RATE: 67 BPM | SYSTOLIC BLOOD PRESSURE: 132 MMHG | TEMPERATURE: 97.8 F | HEIGHT: 59 IN | RESPIRATION RATE: 16 BRPM | WEIGHT: 168.2 LBS | BODY MASS INDEX: 33.91 KG/M2

## 2023-01-26 DIAGNOSIS — D50.8 OTHER IRON DEFICIENCY ANEMIA: ICD-10-CM

## 2023-01-26 DIAGNOSIS — Z00.00 MEDICARE ANNUAL WELLNESS VISIT, SUBSEQUENT: Primary | ICD-10-CM

## 2023-01-26 DIAGNOSIS — F51.04 PSYCHOPHYSIOLOGICAL INSOMNIA: ICD-10-CM

## 2023-01-26 DIAGNOSIS — Z71.89 ADVANCED DIRECTIVES, COUNSELING/DISCUSSION: ICD-10-CM

## 2023-01-26 DIAGNOSIS — I10 ESSENTIAL HYPERTENSION: ICD-10-CM

## 2023-01-26 DIAGNOSIS — F32.A DEPRESSION, UNSPECIFIED DEPRESSION TYPE: ICD-10-CM

## 2023-01-26 DIAGNOSIS — R29.898 WEAKNESS OF BOTH LEGS: ICD-10-CM

## 2023-01-26 DIAGNOSIS — K44.9 HIATAL HERNIA: ICD-10-CM

## 2023-01-26 PROCEDURE — 1101F PT FALLS ASSESS-DOCD LE1/YR: CPT | Performed by: INTERNAL MEDICINE

## 2023-01-26 PROCEDURE — G9717 DOC PT DX DEP/BP F/U NT REQ: HCPCS | Performed by: INTERNAL MEDICINE

## 2023-01-26 PROCEDURE — G8427 DOCREV CUR MEDS BY ELIG CLIN: HCPCS | Performed by: INTERNAL MEDICINE

## 2023-01-26 PROCEDURE — G0439 PPPS, SUBSEQ VISIT: HCPCS | Performed by: INTERNAL MEDICINE

## 2023-01-26 PROCEDURE — G8417 CALC BMI ABV UP PARAM F/U: HCPCS | Performed by: INTERNAL MEDICINE

## 2023-01-26 PROCEDURE — G8536 NO DOC ELDER MAL SCRN: HCPCS | Performed by: INTERNAL MEDICINE

## 2023-01-26 RX ORDER — OMEPRAZOLE 20 MG/1
CAPSULE, DELAYED RELEASE ORAL
Qty: 90 CAPSULE | Refills: 3 | Status: SHIPPED | OUTPATIENT
Start: 2023-01-26

## 2023-01-26 RX ORDER — CHOLECALCIFEROL (VITAMIN D3) 125 MCG
CAPSULE ORAL
Qty: 30 TABLET | Refills: 2 | Status: SHIPPED | OUTPATIENT
Start: 2023-01-26

## 2023-01-26 NOTE — PATIENT INSTRUCTIONS
Medicare Wellness Visit, Female     The best way to live healthy is to have a lifestyle where you eat a well-balanced diet, exercise regularly, limit alcohol use, and quit all forms of tobacco/nicotine, if applicable. Regular preventive services are another way to keep healthy. Preventive services (vaccines, screening tests, monitoring & exams) can help personalize your care plan, which helps you manage your own care. Screening tests can find health problems at the earliest stages, when they are easiest to treat. [facility] follows the current, evidence-based guidelines published by the [facility] ([facility]) when recommending preventive services for our patients. Because we follow these guidelines, sometimes recommendations change over time as research supports it. (For example, mammograms used to be recommended annually. Even though Medicare will still pay for an annual mammogram, the newer guidelines recommend a mammogram every two years for women of average risk). Of course, you and your doctor may decide to screen more often for some diseases, based on your risk and your co-morbidities (chronic disease you are already diagnosed with). Preventive services for you include:  - Medicare offers their members a free annual wellness visit, which is time for you and your primary care provider to discuss and plan for your preventive service needs.  Take advantage of this benefit every year!    -Over the age of 72 should receive the recommended pneumonia vaccines.    -All adults should have a flu vaccine yearly.  -All adults should have a tetanus vaccine every 10 years.   -Over the age 48 should receive the shingles vaccines.        -All adults should be screened once for Hepatitis C.  -All adults age 38-68 who are overweight should have a diabetes screening test once every three years.   -Other screening tests and preventive services for persons with diabetes include: an eye exam to screen for diabetic retinopathy, a kidney function test, a foot exam, and stricter control over your cholesterol.   -Cardiovascular screening for adults with routine risk involves an electrocardiogram (ECG) at intervals determined by your doctor.     -Colorectal cancer screenings should be done for adults age 39-70 with no increased risk factors for colorectal cancer. There are a number of acceptable methods of screening for this type of cancer. Each test has its own benefits and drawbacks. Discuss with your doctor what is most appropriate for you during your annual wellness visit. The different tests include: colonoscopy (considered the best screening method), a fecal occult blood test, a fecal DNA test, and sigmoidoscopy.    -Lung cancer screening is recommended annually with a low dose CT scan for adults between age 54 and 68, who have smoked at least 30 pack years (equivalent of 1 pack per day for 30 days), and who is a current smoker or quit less than 15 years ago.    -A bone mass density test is recommended when a woman turns 65 to screen for osteoporosis. This test is only recommended one time, as a screening. Some providers will use this same test as a disease monitoring tool if you already have osteoporosis. -Breast cancer screenings are recommended every other year for women of normal risk, age 54-69.    -Cervical cancer screenings for women over age 72 are only recommended with certain risk factors.      Here is a list of your current Health Maintenance items (your personalized list of preventive services) with a due date:  Health Maintenance Due   Topic Date Due    DTaP/Tdap/Td  (1 - Tdap) Never done    Shingles Vaccine (1 of 2) Never done    Bone Mineral Density   Never done    COVID-19 Vaccine (4 - Booster for [place] series) 01/11/2022    Yearly Flu Vaccine (1) 08/01/2022

## 2023-01-26 NOTE — ACP (ADVANCE CARE PLANNING)
Advance Care Planning     General Advance Care Planning (ACP) Conversation      Date of Conversation: 1/26/2023  Conducted with: Patient with Decision Making Capacity    Healthcare Decision Maker:   No healthcare decision makers have been documented. Click here to complete [id number] including selection of the Healthcare Decision Maker Relationship (ie \"Primary\")    Today we documented Decision Maker(s) consistent with Legal Next of Kin hierarchy. Content/Action Overview:    Has ACP document(s) NOT on file - requested patient to provide      Length of Voluntary ACP Conversation in minutes:  <16 minutes (Non-Billable)    Vanesa Glover MD

## 2023-01-30 NOTE — ACP (ADVANCE CARE PLANNING)
Patient states that a completed Advanced Medical Directive is at home. NN encouraged patient to bring a copy of the completed Advanced Medical Directive to the office for scanning into the medical record. Patient verbalized understanding & agreement. Spine appears normal, movement of extremities grossly intact.

## 2023-03-02 DIAGNOSIS — E55.9 VITAMIN D DEFICIENCY: ICD-10-CM

## 2023-03-02 DIAGNOSIS — I10 ESSENTIAL HYPERTENSION: ICD-10-CM

## 2023-03-02 RX ORDER — SPIRONOLACTONE 50 MG/1
TABLET, FILM COATED ORAL
Qty: 90 TABLET | Refills: 3 | Status: SHIPPED | OUTPATIENT
Start: 2023-03-02

## 2023-03-02 RX ORDER — ERGOCALCIFEROL 1.25 MG/1
CAPSULE ORAL
Qty: 12 CAPSULE | Refills: 3 | Status: SHIPPED | OUTPATIENT
Start: 2023-03-02

## 2023-12-06 DIAGNOSIS — D50.8 OTHER IRON DEFICIENCY ANEMIAS: ICD-10-CM

## 2023-12-06 DIAGNOSIS — K44.9 DIAPHRAGMATIC HERNIA WITHOUT OBSTRUCTION OR GANGRENE: ICD-10-CM

## 2024-01-18 ENCOUNTER — TELEPHONE (OUTPATIENT)
Age: 89
End: 2024-01-18

## 2024-01-18 RX ORDER — OLANZAPINE 2.5 MG/1
2.5 TABLET, FILM COATED ORAL NIGHTLY
Qty: 30 TABLET | Refills: 2 | Status: SHIPPED | OUTPATIENT
Start: 2024-01-18

## 2024-01-18 NOTE — TELEPHONE ENCOUNTER
01/18/2024--Patient's daughter called this PM stating that patient was out of her Physic medication and needed it refilled to [facility].     Will send in the Olanzapine.

## 2024-02-06 ENCOUNTER — OFFICE VISIT (OUTPATIENT)
Age: 89
End: 2024-02-06
Payer: MEDICARE

## 2024-02-06 VITALS
BODY MASS INDEX: 32.05 KG/M2 | HEART RATE: 77 BPM | WEIGHT: 159 LBS | HEIGHT: 59 IN | RESPIRATION RATE: 16 BRPM | SYSTOLIC BLOOD PRESSURE: 135 MMHG | TEMPERATURE: 98.7 F | OXYGEN SATURATION: 96 % | DIASTOLIC BLOOD PRESSURE: 84 MMHG

## 2024-02-06 DIAGNOSIS — I10 ESSENTIAL HYPERTENSION: Primary | ICD-10-CM

## 2024-02-06 DIAGNOSIS — R29.898 WEAKNESS OF BOTH LOWER EXTREMITIES: ICD-10-CM

## 2024-02-06 DIAGNOSIS — R29.898 OTHER SYMPTOMS AND SIGNS INVOLVING THE MUSCULOSKELETAL SYSTEM: ICD-10-CM

## 2024-02-06 DIAGNOSIS — G47.00 INSOMNIA, UNSPECIFIED TYPE: ICD-10-CM

## 2024-02-06 DIAGNOSIS — R53.83 OTHER FATIGUE: ICD-10-CM

## 2024-02-06 DIAGNOSIS — F32.A DEPRESSION, UNSPECIFIED DEPRESSION TYPE: ICD-10-CM

## 2024-02-06 DIAGNOSIS — R60.9 EDEMA, UNSPECIFIED TYPE: ICD-10-CM

## 2024-02-06 DIAGNOSIS — I10 ESSENTIAL HYPERTENSION: ICD-10-CM

## 2024-02-06 DIAGNOSIS — D50.8 OTHER IRON DEFICIENCY ANEMIAS: ICD-10-CM

## 2024-02-06 DIAGNOSIS — N32.81 OVERACTIVE BLADDER: ICD-10-CM

## 2024-02-06 PROCEDURE — G8417 CALC BMI ABV UP PARAM F/U: HCPCS | Performed by: INTERNAL MEDICINE

## 2024-02-06 PROCEDURE — 1123F ACP DISCUSS/DSCN MKR DOCD: CPT | Performed by: INTERNAL MEDICINE

## 2024-02-06 PROCEDURE — 99214 OFFICE O/P EST MOD 30 MIN: CPT | Performed by: INTERNAL MEDICINE

## 2024-02-06 PROCEDURE — 1090F PRES/ABSN URINE INCON ASSESS: CPT | Performed by: INTERNAL MEDICINE

## 2024-02-06 PROCEDURE — 1036F TOBACCO NON-USER: CPT | Performed by: INTERNAL MEDICINE

## 2024-02-06 PROCEDURE — G8428 CUR MEDS NOT DOCUMENT: HCPCS | Performed by: INTERNAL MEDICINE

## 2024-02-06 PROCEDURE — G8484 FLU IMMUNIZE NO ADMIN: HCPCS | Performed by: INTERNAL MEDICINE

## 2024-02-06 RX ORDER — SPIRONOLACTONE 50 MG/1
50 TABLET, FILM COATED ORAL DAILY
Qty: 90 TABLET | Refills: 1 | Status: SHIPPED | OUTPATIENT
Start: 2024-02-06

## 2024-02-06 RX ORDER — OLANZAPINE 2.5 MG/1
2.5 TABLET, FILM COATED ORAL NIGHTLY
Qty: 90 TABLET | Refills: 2 | Status: SHIPPED | OUTPATIENT
Start: 2024-02-06

## 2024-02-06 SDOH — ECONOMIC STABILITY: FOOD INSECURITY: WITHIN THE PAST 12 MONTHS, YOU WORRIED THAT YOUR FOOD WOULD RUN OUT BEFORE YOU GOT MONEY TO BUY MORE.: NEVER TRUE

## 2024-02-06 SDOH — ECONOMIC STABILITY: INCOME INSECURITY: HOW HARD IS IT FOR YOU TO PAY FOR THE VERY BASICS LIKE FOOD, HOUSING, MEDICAL CARE, AND HEATING?: NOT HARD AT ALL

## 2024-02-06 SDOH — ECONOMIC STABILITY: FOOD INSECURITY: WITHIN THE PAST 12 MONTHS, THE FOOD YOU BOUGHT JUST DIDN'T LAST AND YOU DIDN'T HAVE MONEY TO GET MORE.: NEVER TRUE

## 2024-02-06 SDOH — ECONOMIC STABILITY: HOUSING INSECURITY
IN THE LAST 12 MONTHS, WAS THERE A TIME WHEN YOU DID NOT HAVE A STEADY PLACE TO SLEEP OR SLEPT IN A SHELTER (INCLUDING NOW)?: NO

## 2024-02-06 ASSESSMENT — PATIENT HEALTH QUESTIONNAIRE - PHQ9
SUM OF ALL RESPONSES TO PHQ QUESTIONS 1-9: 0
SUM OF ALL RESPONSES TO PHQ9 QUESTIONS 1 & 2: 0
SUM OF ALL RESPONSES TO PHQ QUESTIONS 1-9: 0
1. LITTLE INTEREST OR PLEASURE IN DOING THINGS: 0
SUM OF ALL RESPONSES TO PHQ QUESTIONS 1-9: 0
SUM OF ALL RESPONSES TO PHQ QUESTIONS 1-9: 0
2. FEELING DOWN, DEPRESSED OR HOPELESS: 0

## 2024-02-06 ASSESSMENT — ENCOUNTER SYMPTOMS
DIARRHEA: 0
SHORTNESS OF BREATH: 0
ABDOMINAL PAIN: 0
BACK PAIN: 0
CHEST TIGHTNESS: 0
CONSTIPATION: 0

## 2024-02-06 NOTE — PROGRESS NOTES
Maria Isabel Avalos is a 89 y.o. female who presents today for Medication Refill (Per pt refill on all meds )  .      She has a history of   Patient Active Problem List   Diagnosis    Presence of permanent cardiac pacemaker    Glaucoma    Depression    Rib fractures    DJD (degenerative joint disease), lumbar    ALEXA (obstructive sleep apnea)    Macular degeneration    Ptosis of eyelid    Drug-induced glaucoma    Pyuria    Essential hypertension    Pseudophakia    Psychophysiological insomnia    Otalgia of both ears   .    Today patient is here for follow-up.     Edema is a bit worse. Leg weakness a bit weak.  Did really benefit from PT last year.    Continues to have some urinary frequency.  We discussed my hesitation to stop her spironolactone.  Has had side effects with medication in the past.    Hypertension- BP stable.   Hypertension ROS: taking medications as instructed, no medication side effects noted, no TIA's, no chest pain on exertion, no dyspnea on exertion, no swelling of ankles     reports that she has never smoked. She has never used smokeless tobacco.    reports no history of alcohol use.   BP Readings from Last 2 Encounters:   02/06/24 135/84   01/26/23 132/82     Mood has been ok. Sleep a bit worse with skipping Zyprexa in January.  Unclear why she ran out of medications.  Has now been back on this and seems to be slowly getting better..      Did have some mild iron deficiency anemia last year.  Due for repeat blood testing.    ROS  Review of Systems   Constitutional:  Negative for fatigue and fever.   HENT:  Negative for congestion and ear pain.    Respiratory:  Negative for chest tightness and shortness of breath.    Cardiovascular:  Positive for leg swelling. Negative for chest pain.   Gastrointestinal:  Negative for abdominal pain, constipation and diarrhea.   Endocrine: Negative for polydipsia.   Genitourinary:  Positive for frequency. Negative for difficulty urinating and dysuria.

## 2024-02-07 LAB
ALBUMIN SERPL-MCNC: 3.6 G/DL (ref 3.5–5)
ALBUMIN/GLOB SERPL: 1.3 (ref 1.1–2.2)
ALP SERPL-CCNC: 93 U/L (ref 45–117)
ALT SERPL-CCNC: 13 U/L (ref 12–78)
ANION GAP SERPL CALC-SCNC: 3 MMOL/L (ref 5–15)
AST SERPL-CCNC: 14 U/L (ref 15–37)
BASOPHILS # BLD: 0.1 K/UL (ref 0–0.1)
BASOPHILS NFR BLD: 1 % (ref 0–1)
BILIRUB SERPL-MCNC: 0.8 MG/DL (ref 0.2–1)
BUN SERPL-MCNC: 19 MG/DL (ref 6–20)
BUN/CREAT SERPL: 20 (ref 12–20)
CALCIUM SERPL-MCNC: 9.4 MG/DL (ref 8.5–10.1)
CHLORIDE SERPL-SCNC: 112 MMOL/L (ref 97–108)
CO2 SERPL-SCNC: 26 MMOL/L (ref 21–32)
CREAT SERPL-MCNC: 0.94 MG/DL (ref 0.55–1.02)
DIFFERENTIAL METHOD BLD: ABNORMAL
EOSINOPHIL # BLD: 0.1 K/UL (ref 0–0.4)
EOSINOPHIL NFR BLD: 1 % (ref 0–7)
ERYTHROCYTE [DISTWIDTH] IN BLOOD BY AUTOMATED COUNT: 13.3 % (ref 11.5–14.5)
FERRITIN SERPL-MCNC: 41 NG/ML (ref 8–252)
GLOBULIN SER CALC-MCNC: 2.8 G/DL (ref 2–4)
GLUCOSE SERPL-MCNC: 99 MG/DL (ref 65–100)
HCT VFR BLD AUTO: 50.1 % (ref 35–47)
HGB BLD-MCNC: 16.7 G/DL (ref 11.5–16)
IMM GRANULOCYTES # BLD AUTO: 0 K/UL (ref 0–0.04)
IMM GRANULOCYTES NFR BLD AUTO: 0 % (ref 0–0.5)
IRON SATN MFR SERPL: 28 % (ref 20–50)
IRON SERPL-MCNC: 91 UG/DL (ref 35–150)
LYMPHOCYTES # BLD: 1.4 K/UL (ref 0.8–3.5)
LYMPHOCYTES NFR BLD: 15 % (ref 12–49)
MCH RBC QN AUTO: 30.4 PG (ref 26–34)
MCHC RBC AUTO-ENTMCNC: 33.3 G/DL (ref 30–36.5)
MCV RBC AUTO: 91.1 FL (ref 80–99)
MONOCYTES # BLD: 0.8 K/UL (ref 0–1)
MONOCYTES NFR BLD: 9 % (ref 5–13)
NEUTS SEG # BLD: 6.8 K/UL (ref 1.8–8)
NEUTS SEG NFR BLD: 74 % (ref 32–75)
NRBC # BLD: 0 K/UL (ref 0–0.01)
NRBC BLD-RTO: 0 PER 100 WBC
PLATELET # BLD AUTO: 222 K/UL (ref 150–400)
PMV BLD AUTO: 12.6 FL (ref 8.9–12.9)
POTASSIUM SERPL-SCNC: 4.9 MMOL/L (ref 3.5–5.1)
PROT SERPL-MCNC: 6.4 G/DL (ref 6.4–8.2)
RBC # BLD AUTO: 5.5 M/UL (ref 3.8–5.2)
SODIUM SERPL-SCNC: 141 MMOL/L (ref 136–145)
TIBC SERPL-MCNC: 330 UG/DL (ref 250–450)
TSH SERPL DL<=0.05 MIU/L-ACNC: 1.79 UIU/ML (ref 0.36–3.74)
WBC # BLD AUTO: 9.1 K/UL (ref 3.6–11)

## 2024-02-18 DIAGNOSIS — E55.9 VITAMIN D DEFICIENCY, UNSPECIFIED: ICD-10-CM

## 2024-02-19 RX ORDER — ERGOCALCIFEROL 1.25 MG/1
CAPSULE ORAL
Qty: 12 CAPSULE | Refills: 3 | Status: SHIPPED | OUTPATIENT
Start: 2024-02-19

## 2024-08-22 DIAGNOSIS — I10 ESSENTIAL HYPERTENSION: ICD-10-CM

## 2024-08-22 RX ORDER — SPIRONOLACTONE 50 MG/1
50 TABLET, FILM COATED ORAL DAILY
Qty: 90 TABLET | Refills: 1 | Status: SHIPPED | OUTPATIENT
Start: 2024-08-22

## 2024-11-27 ENCOUNTER — TELEPHONE (OUTPATIENT)
Facility: CLINIC | Age: 88
End: 2024-11-27

## 2024-11-27 DIAGNOSIS — F32.A DEPRESSION, UNSPECIFIED DEPRESSION TYPE: ICD-10-CM

## 2024-11-27 RX ORDER — OLANZAPINE 2.5 MG/1
TABLET, FILM COATED ORAL
Qty: 30 TABLET | Refills: 0 | Status: SHIPPED | OUTPATIENT
Start: 2024-11-27

## 2025-02-27 DIAGNOSIS — E55.9 VITAMIN D DEFICIENCY, UNSPECIFIED: ICD-10-CM

## 2025-02-27 DIAGNOSIS — I10 ESSENTIAL HYPERTENSION: ICD-10-CM

## 2025-02-27 RX ORDER — ERGOCALCIFEROL 1.25 MG/1
CAPSULE, LIQUID FILLED ORAL
Qty: 12 CAPSULE | Refills: 11 | Status: SHIPPED | OUTPATIENT
Start: 2025-02-27

## 2025-02-27 RX ORDER — SPIRONOLACTONE 50 MG/1
50 TABLET, FILM COATED ORAL DAILY
Qty: 90 TABLET | Refills: 11 | Status: SHIPPED | OUTPATIENT
Start: 2025-02-27

## (undated) DEVICE — BLADELESS OPTICAL TROCAR WITH FIXATION CANNULA: Brand: VERSAONE

## (undated) DEVICE — DERMABOND SKIN ADH 0.7ML -- DERMABOND ADVANCED 12/BX

## (undated) DEVICE — SYRINGE MED 20ML STD CLR PLAS LUERLOCK TIP N CTRL DISP

## (undated) DEVICE — STERILE POLYISOPRENE POWDER-FREE SURGICAL GLOVES: Brand: PROTEXIS

## (undated) DEVICE — SURGICAL PROCEDURE KIT GEN LAPAROSCOPY LF

## (undated) DEVICE — BLADELESS OPTICAL TROCAR WITH FIXATION CANNULA: Brand: VERSAPORT

## (undated) DEVICE — REM POLYHESIVE ADULT PATIENT RETURN ELECTRODE: Brand: VALLEYLAB

## (undated) DEVICE — DEVICE TRNSF SPIK STL 2008S] MICROTEK MEDICAL INC]

## (undated) DEVICE — DEVON™ KNEE AND BODY STRAP 60" X 3" (1.5 M X 7.6 CM): Brand: DEVON

## (undated) DEVICE — SPONGE HEMOSTAT CELLULS 4X8IN -- SURGICEL

## (undated) DEVICE — SPECIMEN RETRIEVAL POUCH: Brand: ENDO CATCH GOLD

## (undated) DEVICE — CLICKLINE SCISSORS INSERT: Brand: CLICKLINE

## (undated) DEVICE — TROCAR SITE CLOSURE DEVICE: Brand: ENDO CLOSE

## (undated) DEVICE — COVER LT HNDL BLU PLAS

## (undated) DEVICE — 3000CC GUARDIAN II: Brand: GUARDIAN

## (undated) DEVICE — DRAPE XR C ARM 41X74IN LF --

## (undated) DEVICE — CATHETER IV 14GA L2IN POLYUR STR ORNG HUB SFTY RADPQ DISP

## (undated) DEVICE — Device

## (undated) DEVICE — INFECTION CONTROL KIT SYS

## (undated) DEVICE — KENDALL SCD EXPRESS SLEEVES, KNEE LENGTH, MEDIUM: Brand: KENDALL SCD

## (undated) DEVICE — SOL IRRIGATION INJ NACL 0.9% 500ML BTL

## (undated) DEVICE — UNIVERSAL FIXATION CANNULA: Brand: VERSAONE

## (undated) DEVICE — SET CHOLANGIOGRAPHY 4FR L60CM W/ ARW KARLAN BLLN CATH CRV

## (undated) DEVICE — SUTURE MCRYL SZ 4-0 L27IN ABSRB UD L19MM PS-2 1/2 CIR PRIM Y426H

## (undated) DEVICE — E-Z CLEAN, PTFE COATED, ELECTROSURGICAL LAPAROSCOPIC ELECTRODE, L-HOOK, 33 CM., SINGLE-USE, FOR USE WITH HAND CONTROL PENCIL: Brand: MEGADYNE

## (undated) DEVICE — SUTURE SZ 0 27IN 5/8 CIR UR-6  TAPER PT VIOLET ABSRB VICRYL J603H

## (undated) DEVICE — APPLIER LIG CLP 5MM CONTAIN 16 TI CLP DISP ENDO CLP

## (undated) DEVICE — NEEDLE HYPO 22GA L1.5IN BLK S STL HUB POLYPR SHLD REG BVL

## (undated) DEVICE — TUBING INSUFLTN 10FT LUER -- CONVERT TO ITEM 368568

## (undated) DEVICE — (D)PREP SKN CHLRAPRP APPL 26ML -- CONVERT TO ITEM 371833